# Patient Record
Sex: FEMALE | Race: OTHER | Employment: UNEMPLOYED | URBAN - METROPOLITAN AREA
[De-identification: names, ages, dates, MRNs, and addresses within clinical notes are randomized per-mention and may not be internally consistent; named-entity substitution may affect disease eponyms.]

---

## 2016-10-28 LAB — HIV RESULT OB: NEGATIVE

## 2017-01-07 ENCOUNTER — ROUTINE PRENATAL (OUTPATIENT)
Dept: OBGYN CLINIC | Facility: CLINIC | Age: 37
End: 2017-01-07

## 2017-01-07 ENCOUNTER — APPOINTMENT (OUTPATIENT)
Dept: LAB | Facility: HOSPITAL | Age: 37
End: 2017-01-07
Attending: OBSTETRICS & GYNECOLOGY
Payer: MEDICAID

## 2017-01-07 VITALS
WEIGHT: 112 LBS | DIASTOLIC BLOOD PRESSURE: 62 MMHG | SYSTOLIC BLOOD PRESSURE: 93 MMHG | BODY MASS INDEX: 20 KG/M2 | HEART RATE: 101 BPM

## 2017-01-07 DIAGNOSIS — Z34.92 ENCOUNTER FOR SUPERVISION OF NORMAL PREGNANCY IN SECOND TRIMESTER, UNSPECIFIED GRAVIDITY: ICD-10-CM

## 2017-01-07 DIAGNOSIS — Z34.92 ENCOUNTER FOR SUPERVISION OF NORMAL PREGNANCY IN SECOND TRIMESTER, UNSPECIFIED GRAVIDITY: Primary | ICD-10-CM

## 2017-01-07 LAB
ERYTHROCYTE [DISTWIDTH] IN BLOOD BY AUTOMATED COUNT: 14 % (ref 11–15)
GLUCOSE 1H P 50 G GLC PO SERPL-MCNC: 155 MG/DL
HCT VFR BLD AUTO: 35.7 % (ref 35–48)
HGB BLD-MCNC: 12 G/DL (ref 12–16)
MCH RBC QN AUTO: 31.4 PG (ref 27–32)
MCHC RBC AUTO-ENTMCNC: 33.6 G/DL (ref 32–37)
MCV RBC AUTO: 93.5 FL (ref 80–100)
MULTISTIX LOT#: NORMAL NUMERIC
PH, URINE: 7.5 (ref 4.5–8)
PLATELET # BLD AUTO: 265 K/UL (ref 140–400)
PMV BLD AUTO: 8.8 FL (ref 7.4–10.3)
RBC # BLD AUTO: 3.82 M/UL (ref 3.7–5.4)
SPECIFIC GRAVITY: 1.01 (ref 1–1.03)
UROBILINOGEN,SEMI-QN: 0.2 MG/DL (ref 0–1.9)
WBC # BLD AUTO: 7.9 K/UL (ref 4–11)

## 2017-01-07 PROCEDURE — 36415 COLL VENOUS BLD VENIPUNCTURE: CPT

## 2017-01-07 PROCEDURE — 0502F SUBSEQUENT PRENATAL CARE: CPT | Performed by: OBSTETRICS & GYNECOLOGY

## 2017-01-07 PROCEDURE — 82950 GLUCOSE TEST: CPT

## 2017-01-07 PROCEDURE — 85027 COMPLETE CBC AUTOMATED: CPT

## 2017-01-07 NOTE — PROGRESS NOTES
C/o being full all the time and not being able to eat much. Patient with 7# weight gain since last appointment. Reviewed need for regular prenatal care. 24-28 week labs ordered today and given to the patient.    RTC 2-3 weeks

## 2017-01-10 ENCOUNTER — TELEPHONE (OUTPATIENT)
Dept: OBGYN CLINIC | Facility: CLINIC | Age: 37
End: 2017-01-10

## 2017-01-10 DIAGNOSIS — O99.810 ABNORMAL MATERNAL GLUCOSE TOLERANCE, ANTEPARTUM: Primary | ICD-10-CM

## 2017-01-10 NOTE — TELEPHONE ENCOUNTER
Lm with father. States will let her know when he arrives home. Father attempted to give me # for pt and call dropped.

## 2017-01-10 NOTE — TELEPHONE ENCOUNTER
Attempted to call [phone] \"number not in service\", [phone] is a business number. Left a msg previously at home # listed as [phone] will try to call pt again later today.

## 2017-01-10 NOTE — TELEPHONE ENCOUNTER
Informed pt of one hour GTT results. Pt needs 3 hour GTT. # given to schedule appt, pt to call today. Instructed to fast for 12 hours prior to the test, nothing but water. Test ordered. Pt verbalized understanding.

## 2017-01-10 NOTE — TELEPHONE ENCOUNTER
----- Message from Cici Gaitan MD sent at 1/7/2017  5:20 PM CST -----  Please let patient know that she did not pass one hour GTT. She will need to take 3 hour GTT.

## 2017-01-10 NOTE — TELEPHONE ENCOUNTER
Per dad he is returning a call, and states that the pt can be reached at [phone] or [phone]. Please advise.

## 2017-01-11 ENCOUNTER — LAB ENCOUNTER (OUTPATIENT)
Dept: LAB | Facility: HOSPITAL | Age: 37
End: 2017-01-11
Attending: OBSTETRICS & GYNECOLOGY
Payer: MEDICAID

## 2017-01-11 DIAGNOSIS — O99.810 ABNORMAL MATERNAL GLUCOSE TOLERANCE, ANTEPARTUM: ICD-10-CM

## 2017-01-11 LAB
GLUCOSE 1H P GLC SERPL-MCNC: 170 MG/DL
GLUCOSE 2H P GLC SERPL-MCNC: 141 MG/DL
GLUCOSE 3H P GLC SERPL-MCNC: 136 MG/DL
GLUCOSE P FAST SERPL-MCNC: 82 MG/DL (ref 70–99)

## 2017-01-11 PROCEDURE — 36415 COLL VENOUS BLD VENIPUNCTURE: CPT

## 2017-01-11 PROCEDURE — 82951 GLUCOSE TOLERANCE TEST (GTT): CPT

## 2017-01-11 PROCEDURE — 82952 GTT-ADDED SAMPLES: CPT

## 2017-01-16 ENCOUNTER — ROUTINE PRENATAL (OUTPATIENT)
Dept: OBGYN CLINIC | Facility: CLINIC | Age: 37
End: 2017-01-16

## 2017-01-16 VITALS
SYSTOLIC BLOOD PRESSURE: 100 MMHG | WEIGHT: 115 LBS | HEART RATE: 99 BPM | DIASTOLIC BLOOD PRESSURE: 67 MMHG | BODY MASS INDEX: 20 KG/M2

## 2017-01-16 DIAGNOSIS — Z34.92 ENCOUNTER FOR SUPERVISION OF NORMAL PREGNANCY IN SECOND TRIMESTER, UNSPECIFIED GRAVIDITY: Primary | ICD-10-CM

## 2017-01-16 LAB
MULTISTIX LOT#: NORMAL NUMERIC
PH, URINE: 7 (ref 4.5–8)
SPECIFIC GRAVITY: 1.01 (ref 1–1.03)

## 2017-01-16 PROCEDURE — 90715 TDAP VACCINE 7 YRS/> IM: CPT | Performed by: OBSTETRICS & GYNECOLOGY

## 2017-01-16 PROCEDURE — 90471 IMMUNIZATION ADMIN: CPT | Performed by: OBSTETRICS & GYNECOLOGY

## 2017-01-16 PROCEDURE — 0502F SUBSEQUENT PRENATAL CARE: CPT | Performed by: OBSTETRICS & GYNECOLOGY

## 2017-01-23 ENCOUNTER — APPOINTMENT (OUTPATIENT)
Dept: LAB | Facility: HOSPITAL | Age: 37
End: 2017-01-23
Attending: OBSTETRICS & GYNECOLOGY
Payer: MEDICAID

## 2017-01-23 DIAGNOSIS — Z34.92 ENCOUNTER FOR SUPERVISION OF NORMAL PREGNANCY IN SECOND TRIMESTER, UNSPECIFIED GRAVIDITY: ICD-10-CM

## 2017-01-23 LAB
ANTIBODY SCREEN: NEGATIVE
RH BLOOD TYPE: NEGATIVE

## 2017-01-23 PROCEDURE — 86900 BLOOD TYPING SEROLOGIC ABO: CPT

## 2017-01-23 PROCEDURE — 36415 COLL VENOUS BLD VENIPUNCTURE: CPT

## 2017-01-23 PROCEDURE — 86901 BLOOD TYPING SEROLOGIC RH(D): CPT

## 2017-01-23 PROCEDURE — 86850 RBC ANTIBODY SCREEN: CPT

## 2017-01-30 ENCOUNTER — ROUTINE PRENATAL (OUTPATIENT)
Dept: OBGYN CLINIC | Facility: CLINIC | Age: 37
End: 2017-01-30

## 2017-01-30 VITALS
BODY MASS INDEX: 20 KG/M2 | DIASTOLIC BLOOD PRESSURE: 61 MMHG | WEIGHT: 114.81 LBS | SYSTOLIC BLOOD PRESSURE: 91 MMHG | HEART RATE: 98 BPM

## 2017-01-30 DIAGNOSIS — Z34.93 ENCOUNTER FOR SUPERVISION OF NORMAL PREGNANCY IN THIRD TRIMESTER, UNSPECIFIED GRAVIDITY: Primary | ICD-10-CM

## 2017-01-30 DIAGNOSIS — O09.523 AMA (ADVANCED MATERNAL AGE) MULTIGRAVIDA 35+, THIRD TRIMESTER: ICD-10-CM

## 2017-01-30 PROBLEM — Z34.90 PREGNANCY, OBSTETRICAL CARE: Status: ACTIVE | Noted: 2017-01-30

## 2017-01-30 PROBLEM — Z78.9 NON-ENGLISH SPEAKING PATIENT: Status: ACTIVE | Noted: 2017-01-30

## 2017-01-30 PROBLEM — Z34.90 PREGNANCY, OBSTETRICAL CARE (HCC): Status: ACTIVE | Noted: 2017-01-30

## 2017-01-30 LAB
MULTISTIX EXPIRATION DATE: NORMAL DATE
MULTISTIX LOT#: NORMAL NUMERIC
PH, URINE: 6.5 (ref 4.5–8)
SPECIFIC GRAVITY: 1.01 (ref 1–1.03)
UROBILINOGEN,SEMI-QN: 0.2 MG/DL (ref 0–1.9)

## 2017-01-30 PROCEDURE — 96372 THER/PROPH/DIAG INJ SC/IM: CPT | Performed by: OBSTETRICS & GYNECOLOGY

## 2017-01-30 PROCEDURE — 0502F SUBSEQUENT PRENATAL CARE: CPT | Performed by: OBSTETRICS & GYNECOLOGY

## 2017-01-30 NOTE — PATIENT INSTRUCTIONS
If You Are Rh Negative – Routine Administration    If you’re Rh negative, ask your health care provider about getting treated with RhoGam or Rhophylac. Even if you miscarry or don’t deliver the baby, you will still need treatment.  The health of any baby as directed      Location, date and time:  1/30/17, [facility], 8:50 AM

## 2017-01-30 NOTE — PROGRESS NOTES
Pt given Rhophylac 300 mcg today in RGM. Pt tolerated well. Pt had her ABO/RH and Antibody done on 1/23/17. ABO/RH was B Negative and Antibody was negative. Pt given Rhophylac card filled out. Pt signed consent. Alyson Grgregg[address] [phone]. EXP 30 March 2019.

## 2017-01-30 NOTE — PROGRESS NOTES
Dad at visit. No S/S of PTL. Does admit to small amount of white DC w/o pruritis. LOF or blood. No frequent tightening.

## 2017-02-04 ENCOUNTER — TELEPHONE (OUTPATIENT)
Dept: OBGYN CLINIC | Facility: CLINIC | Age: 37
End: 2017-02-04

## 2017-02-04 ENCOUNTER — HOSPITAL ENCOUNTER (OUTPATIENT)
Facility: HOSPITAL | Age: 37
Setting detail: OBSERVATION
Discharge: HOME | End: 2017-02-04
Attending: OBSTETRICS & GYNECOLOGY | Admitting: OBSTETRICS & GYNECOLOGY
Payer: MEDICAID

## 2017-02-04 ENCOUNTER — TELEPHONE (OUTPATIENT)
Dept: FAMILY MEDICINE CLINIC | Facility: CLINIC | Age: 37
End: 2017-02-04

## 2017-02-04 VITALS — SYSTOLIC BLOOD PRESSURE: 98 MMHG | DIASTOLIC BLOOD PRESSURE: 63 MMHG | HEART RATE: 88 BPM | TEMPERATURE: 99 F

## 2017-02-04 LAB
BILIRUB UR QL STRIP: NEGATIVE
CLARITY UR: CLEAR
COLOR UR: YELLOW
GLUCOSE UR STRIP-MCNC: NEGATIVE MG/DL
HGB UR QL STRIP: NEGATIVE
KETONES UR STRIP-MCNC: NEGATIVE MG/DL
NITRITE UR QL STRIP: NEGATIVE
PH UR STRIP: 7 [PH]
PROT UR STRIP-MCNC: NEGATIVE MG/DL
SP GR UR STRIP: 1.01
UROBILINOGEN UR STRIP-ACNC: <2 MG/DL

## 2017-02-04 PROCEDURE — 59025 FETAL NON-STRESS TEST: CPT | Performed by: OBSTETRICS & GYNECOLOGY

## 2017-02-04 NOTE — NST
Nonstress Test   Patient: Sania Tafoya    Gestation: 31w5d    NST:  Reactive, appropriate for gestational age.         Variability: Moderate           Accelerations: Yes           Decelerations: None            Baseline: 145 BPM           Uterine Irritability: Yes

## 2017-02-04 NOTE — TELEPHONE ENCOUNTER
WILL informed of below. Per NJG pt called yesterday and was advised to come into [facility] to be evaluated. Pt to come in now. Kizzy at [facility] notified.

## 2017-02-04 NOTE — TELEPHONE ENCOUNTER
Pt and father advised of [facility]'s recs and verbalizes understanding. Pt states did not have transportation to come in yesterday but will have her brother bring her in today and will arrive to [facility] in 30-40 minutes. Pt has no further questions.

## 2017-02-04 NOTE — TELEPHONE ENCOUNTER
31w5d states dry mouth and dizziness, and \"mid belly pain\" since yesterday at 2pm.  Pt called sister to help interpret as was not able to rate or describe pain.   Pt states thru sister pain at 4/10 and states pain feels \"different\" not a tabbing pain, n

## 2017-02-04 NOTE — TELEPHONE ENCOUNTER
[facility] spoke with pt earlier and pt was c/o abdominal pain, dry mouth, and dizziness. States pain radiating to left side of abdomen. [facility] pt told her pt called our office yesterday and was advised by NJ to come into [facility] but pt did not go.   B

## 2017-02-05 PROBLEM — O47.00 THREATENED PRETERM LABOR: Status: ACTIVE | Noted: 2017-02-05

## 2017-02-05 PROBLEM — O47.00 THREATENED PRETERM LABOR (HCC): Status: ACTIVE | Noted: 2017-02-05

## 2017-02-13 ENCOUNTER — HOSPITAL ENCOUNTER (OUTPATIENT)
Dept: ULTRASOUND IMAGING | Facility: HOSPITAL | Age: 37
Discharge: HOME | End: 2017-02-13
Attending: OBSTETRICS & GYNECOLOGY
Payer: MEDICAID

## 2017-02-13 DIAGNOSIS — O09.523 AMA (ADVANCED MATERNAL AGE) MULTIGRAVIDA 35+, THIRD TRIMESTER: ICD-10-CM

## 2017-02-13 DIAGNOSIS — Z34.93 ENCOUNTER FOR SUPERVISION OF NORMAL PREGNANCY IN THIRD TRIMESTER, UNSPECIFIED GRAVIDITY: ICD-10-CM

## 2017-02-13 PROCEDURE — 76816 OB US FOLLOW-UP PER FETUS: CPT

## 2017-02-14 ENCOUNTER — ROUTINE PRENATAL (OUTPATIENT)
Dept: OBGYN CLINIC | Facility: CLINIC | Age: 37
End: 2017-02-14

## 2017-02-14 VITALS
WEIGHT: 117 LBS | BODY MASS INDEX: 21 KG/M2 | SYSTOLIC BLOOD PRESSURE: 97 MMHG | DIASTOLIC BLOOD PRESSURE: 66 MMHG | HEART RATE: 103 BPM

## 2017-02-14 DIAGNOSIS — Z34.93 ENCOUNTER FOR SUPERVISION OF NORMAL PREGNANCY IN THIRD TRIMESTER, UNSPECIFIED GRAVIDITY: Primary | ICD-10-CM

## 2017-02-14 LAB
MULTISTIX LOT#: NORMAL NUMERIC
PH, URINE: 7 (ref 4.5–8)
SPECIFIC GRAVITY: 1.01 (ref 1–1.03)
UROBILINOGEN,SEMI-QN: 0.2 MG/DL (ref 0–1.9)

## 2017-02-14 PROCEDURE — 0502F SUBSEQUENT PRENATAL CARE: CPT | Performed by: OBSTETRICS & GYNECOLOGY

## 2017-02-16 ENCOUNTER — TELEPHONE (OUTPATIENT)
Dept: OBGYN CLINIC | Facility: CLINIC | Age: 37
End: 2017-02-16

## 2017-02-16 NOTE — TELEPHONE ENCOUNTER
Pt states she is returning our call, states someone called her and the call dropped. No notes stating we called her. Apologized to pt and informed her that whoever called did not chart a note and will likely call her back.

## 2017-02-27 ENCOUNTER — ROUTINE PRENATAL (OUTPATIENT)
Dept: OBGYN CLINIC | Facility: CLINIC | Age: 37
End: 2017-02-27

## 2017-02-27 ENCOUNTER — APPOINTMENT (OUTPATIENT)
Dept: LAB | Facility: HOSPITAL | Age: 37
End: 2017-02-27
Attending: OBSTETRICS & GYNECOLOGY
Payer: MEDICAID

## 2017-02-27 VITALS
BODY MASS INDEX: 21 KG/M2 | HEART RATE: 102 BPM | SYSTOLIC BLOOD PRESSURE: 95 MMHG | WEIGHT: 118 LBS | DIASTOLIC BLOOD PRESSURE: 61 MMHG

## 2017-02-27 DIAGNOSIS — Z34.93 ENCOUNTER FOR SUPERVISION OF NORMAL PREGNANCY IN THIRD TRIMESTER, UNSPECIFIED GRAVIDITY: ICD-10-CM

## 2017-02-27 DIAGNOSIS — Z34.83 ENCOUNTER FOR SUPERVISION OF OTHER NORMAL PREGNANCY IN THIRD TRIMESTER: Primary | ICD-10-CM

## 2017-02-27 LAB
APPEARANCE: CLEAR
ERYTHROCYTE [DISTWIDTH] IN BLOOD BY AUTOMATED COUNT: 13.6 % (ref 11–15)
HCT VFR BLD AUTO: 33.7 % (ref 35–48)
HGB BLD-MCNC: 11.4 G/DL (ref 12–16)
MCH RBC QN AUTO: 30.8 PG (ref 27–32)
MCHC RBC AUTO-ENTMCNC: 33.8 G/DL (ref 32–37)
MCV RBC AUTO: 91 FL (ref 80–100)
MULTISTIX LOT#: ABNORMAL NUMERIC
PH, URINE: 6 (ref 4.5–8)
PLATELET # BLD AUTO: 268 K/UL (ref 140–400)
PMV BLD AUTO: 8.4 FL (ref 7.4–10.3)
RBC # BLD AUTO: 3.7 M/UL (ref 3.7–5.4)
SPECIFIC GRAVITY: 1 (ref 1–1.03)
URINE-COLOR: YELLOW
WBC # BLD AUTO: 7.2 K/UL (ref 4–11)

## 2017-02-27 PROCEDURE — 81002 URINALYSIS NONAUTO W/O SCOPE: CPT | Performed by: OBSTETRICS & GYNECOLOGY

## 2017-02-27 PROCEDURE — 0502F SUBSEQUENT PRENATAL CARE: CPT | Performed by: OBSTETRICS & GYNECOLOGY

## 2017-02-27 PROCEDURE — 36415 COLL VENOUS BLD VENIPUNCTURE: CPT

## 2017-02-27 PROCEDURE — 86780 TREPONEMA PALLIDUM: CPT

## 2017-02-27 PROCEDURE — 85027 COMPLETE CBC AUTOMATED: CPT

## 2017-03-01 LAB — T PALLIDUM AB SER QL: NEGATIVE

## 2017-03-07 ENCOUNTER — TELEPHONE (OUTPATIENT)
Dept: OBGYN CLINIC | Facility: CLINIC | Age: 37
End: 2017-03-07

## 2017-03-07 NOTE — TELEPHONE ENCOUNTER
Pt states that she can't hear me, asked to call her father. Pt's father states that pt has been very Talia" and \"irritable\" since she became pregnant. He feels pt may be depressed but states that pt has not talked about hurting herself or others.  Pt i

## 2017-03-07 NOTE — TELEPHONE ENCOUNTER
Pt was at [facility] office re: EPDS looks edgard 19; feeling overwhelmed; first pregnancy  Was referred to consuling  Pt is 37 wks  Pt has broken [place]

## 2017-03-08 ENCOUNTER — ROUTINE PRENATAL (OUTPATIENT)
Dept: OBGYN CLINIC | Facility: CLINIC | Age: 37
End: 2017-03-08

## 2017-03-08 VITALS
SYSTOLIC BLOOD PRESSURE: 91 MMHG | WEIGHT: 120.63 LBS | DIASTOLIC BLOOD PRESSURE: 59 MMHG | HEART RATE: 90 BPM | BODY MASS INDEX: 21 KG/M2

## 2017-03-08 DIAGNOSIS — Z34.93 ENCOUNTER FOR SUPERVISION OF NORMAL PREGNANCY IN THIRD TRIMESTER, UNSPECIFIED GRAVIDITY: Primary | ICD-10-CM

## 2017-03-08 PROCEDURE — 0502F SUBSEQUENT PRENATAL CARE: CPT | Performed by: OBSTETRICS & GYNECOLOGY

## 2017-03-13 ENCOUNTER — ROUTINE PRENATAL (OUTPATIENT)
Dept: OBGYN CLINIC | Facility: CLINIC | Age: 37
End: 2017-03-13

## 2017-03-13 VITALS
SYSTOLIC BLOOD PRESSURE: 101 MMHG | DIASTOLIC BLOOD PRESSURE: 71 MMHG | HEART RATE: 108 BPM | BODY MASS INDEX: 21 KG/M2 | WEIGHT: 121 LBS

## 2017-03-13 DIAGNOSIS — Z34.93 ENCOUNTER FOR SUPERVISION OF NORMAL PREGNANCY IN THIRD TRIMESTER, UNSPECIFIED GRAVIDITY: Primary | ICD-10-CM

## 2017-03-13 LAB
MULTISTIX LOT#: NORMAL NUMERIC
PH, URINE: 6.5 (ref 4.5–8)
SPECIFIC GRAVITY: 1.01 (ref 1–1.03)
UROBILINOGEN,SEMI-QN: 0.2 MG/DL (ref 0–1.9)

## 2017-03-13 PROCEDURE — 0502F SUBSEQUENT PRENATAL CARE: CPT | Performed by: OBSTETRICS & GYNECOLOGY

## 2017-03-15 ENCOUNTER — TELEPHONE (OUTPATIENT)
Dept: OBGYN CLINIC | Facility: CLINIC | Age: 37
End: 2017-03-15

## 2017-03-16 NOTE — TELEPHONE ENCOUNTER
I spoke with patient's Father , who usually translates at visits. She is having some low back pain and a tight feeling in abdomen since 1600. No rhythmic tightening, LOF or blood. Baby is active. Labor instructions translated to patient.

## 2017-03-19 ENCOUNTER — HOSPITAL ENCOUNTER (OUTPATIENT)
Facility: HOSPITAL | Age: 37
Setting detail: OBSERVATION
Discharge: HOME | End: 2017-03-19
Attending: OBSTETRICS & GYNECOLOGY | Admitting: OBSTETRICS & GYNECOLOGY
Payer: MEDICAID

## 2017-03-19 VITALS
SYSTOLIC BLOOD PRESSURE: 103 MMHG | TEMPERATURE: 98 F | RESPIRATION RATE: 18 BRPM | DIASTOLIC BLOOD PRESSURE: 68 MMHG | HEART RATE: 79 BPM

## 2017-03-19 PROCEDURE — 59025 FETAL NON-STRESS TEST: CPT | Performed by: OBSTETRICS & GYNECOLOGY

## 2017-03-20 NOTE — TRIAGE
[facility]      Patient came in stating she has not felt the baby move since 4 pm, nst done, baby reactive, contractions occuring every 4-5 min, sve 1/30/-3. Dr. Pete Birmingham notified.  Telephone order to discharge patient home with labor instructio BPM           Uterine Irritability: Yes           Contractions: Regular           Minutes Between Contractions: 5 min (4-5 )           Acoustic Stimulator: No           Nonstress Test Interpretation: Reactive           Nonstress Test Second Interpretation:

## 2017-03-21 ENCOUNTER — ROUTINE PRENATAL (OUTPATIENT)
Dept: OBGYN CLINIC | Facility: CLINIC | Age: 37
End: 2017-03-21

## 2017-03-21 VITALS
SYSTOLIC BLOOD PRESSURE: 101 MMHG | BODY MASS INDEX: 22 KG/M2 | WEIGHT: 125 LBS | HEART RATE: 91 BPM | DIASTOLIC BLOOD PRESSURE: 69 MMHG

## 2017-03-21 DIAGNOSIS — Z34.93 ENCOUNTER FOR SUPERVISION OF NORMAL PREGNANCY IN THIRD TRIMESTER, UNSPECIFIED GRAVIDITY: Primary | ICD-10-CM

## 2017-03-21 LAB
MULTISTIX LOT#: NORMAL NUMERIC
PH, URINE: 6 (ref 4.5–8)
SPECIFIC GRAVITY: 1.01 (ref 1–1.03)
UROBILINOGEN,SEMI-QN: 0.2 MG/DL (ref 0–1.9)

## 2017-03-21 PROCEDURE — 0502F SUBSEQUENT PRENATAL CARE: CPT | Performed by: OBSTETRICS & GYNECOLOGY

## 2017-03-21 NOTE — PROGRESS NOTES
Irregular contractions overnight and had gush of fluid. Pooling/ferning/nitrazine negative in office. Cx 2/60/-3. Labor precautions given. RTC 1 wk.

## 2017-03-22 ENCOUNTER — TELEPHONE (OUTPATIENT)
Dept: OBGYN CLINIC | Facility: CLINIC | Age: 37
End: 2017-03-22

## 2017-03-22 NOTE — TELEPHONE ENCOUNTER
The pt is 38 weeks pregnant, and hurt her tooth. The pt is at the dentist now but needs clearance to get her tooth taken care of. Please fax clearance to Dr Stacey Linton at . Please advise.

## 2017-03-24 ENCOUNTER — TELEPHONE (OUTPATIENT)
Dept: OBGYN CLINIC | Facility: CLINIC | Age: 37
End: 2017-03-24

## 2017-03-25 ENCOUNTER — HOSPITAL ENCOUNTER (OUTPATIENT)
Facility: HOSPITAL | Age: 37
Setting detail: OBSERVATION
Discharge: HOME | End: 2017-03-25
Attending: OBSTETRICS & GYNECOLOGY | Admitting: OBSTETRICS & GYNECOLOGY
Payer: MEDICAID

## 2017-03-25 VITALS
SYSTOLIC BLOOD PRESSURE: 110 MMHG | HEART RATE: 78 BPM | DIASTOLIC BLOOD PRESSURE: 75 MMHG | TEMPERATURE: 98 F | RESPIRATION RATE: 18 BRPM

## 2017-03-25 PROBLEM — Z34.90 PREGNANCY: Status: ACTIVE | Noted: 2017-03-25

## 2017-03-25 PROBLEM — Z34.90 PREGNANCY (HCC): Status: ACTIVE | Noted: 2017-03-25

## 2017-03-25 PROCEDURE — 59025 FETAL NON-STRESS TEST: CPT | Performed by: OBSTETRICS & GYNECOLOGY

## 2017-03-25 NOTE — TRIAGE
[facility]      Triage Note    Cleotis Man Patient Status:  Observation    1980 MRN [id number]   Location [address] C-D Attending Ryan Tavarez DO   Hosp Day # 0 PCP MD Zain Scherre Para: Aris Aguillon sonya  In with c/o painful UC. Reactive tracing, uterus soft and relaxed, UC palpating mildly. Irregular UC per toco. No fluid or bloody show noted. No change from office vaginal exam. Pt DC home with labor instructions and kick counts.        Edwar Tomas

## 2017-03-25 NOTE — TELEPHONE ENCOUNTER
On CALL--  Spoke with pt father. Pt c/o swelling in feet tonight and increased thick vaginal discharge. Recommended elevating feet and increase water. Labor precautions given.

## 2017-03-29 ENCOUNTER — ROUTINE PRENATAL (OUTPATIENT)
Dept: OBGYN CLINIC | Facility: CLINIC | Age: 37
End: 2017-03-29

## 2017-03-29 VITALS
SYSTOLIC BLOOD PRESSURE: 107 MMHG | DIASTOLIC BLOOD PRESSURE: 72 MMHG | HEART RATE: 80 BPM | WEIGHT: 124 LBS | BODY MASS INDEX: 22 KG/M2

## 2017-03-29 DIAGNOSIS — Z34.93 ENCOUNTER FOR SUPERVISION OF NORMAL PREGNANCY IN THIRD TRIMESTER, UNSPECIFIED GRAVIDITY: Primary | ICD-10-CM

## 2017-03-29 LAB
MULTISTIX LOT#: NORMAL NUMERIC
PH, URINE: 7 (ref 4.5–8)
SPECIFIC GRAVITY: 1 (ref 1–1.03)

## 2017-03-29 PROCEDURE — 0502F SUBSEQUENT PRENATAL CARE: CPT | Performed by: OBSTETRICS & GYNECOLOGY

## 2017-04-03 ENCOUNTER — TELEPHONE (OUTPATIENT)
Dept: OBGYN CLINIC | Facility: CLINIC | Age: 37
End: 2017-04-03

## 2017-04-04 ENCOUNTER — HOSPITAL ENCOUNTER (INPATIENT)
Facility: HOSPITAL | Age: 37
LOS: 5 days | Discharge: HOME | End: 2017-04-09
Attending: OBSTETRICS & GYNECOLOGY | Admitting: OBSTETRICS & GYNECOLOGY
Payer: MEDICAID

## 2017-04-04 DIAGNOSIS — O42.10: ICD-10-CM

## 2017-04-04 RX ORDER — DEXTROSE, SODIUM CHLORIDE, SODIUM LACTATE, POTASSIUM CHLORIDE, AND CALCIUM CHLORIDE 5; .6; .31; .03; .02 G/100ML; G/100ML; G/100ML; G/100ML; G/100ML
INJECTION, SOLUTION INTRAVENOUS
Status: COMPLETED
Start: 2017-04-04 | End: 2017-04-04

## 2017-04-04 RX ORDER — IBUPROFEN 600 MG/1
600 TABLET ORAL ONCE AS NEEDED
Status: DISCONTINUED | OUTPATIENT
Start: 2017-04-04 | End: 2017-04-05 | Stop reason: HOSPADM

## 2017-04-04 RX ORDER — DEXTROSE, SODIUM CHLORIDE, SODIUM LACTATE, POTASSIUM CHLORIDE, AND CALCIUM CHLORIDE 5; .6; .31; .03; .02 G/100ML; G/100ML; G/100ML; G/100ML; G/100ML
125 INJECTION, SOLUTION INTRAVENOUS CONTINUOUS
Status: DISCONTINUED | OUTPATIENT
Start: 2017-04-04 | End: 2017-04-05 | Stop reason: HOSPADM

## 2017-04-04 RX ORDER — SODIUM CHLORIDE 0.9 % (FLUSH) 0.9 %
10 SYRINGE (ML) INJECTION AS NEEDED
Status: DISCONTINUED | OUTPATIENT
Start: 2017-04-04 | End: 2017-04-05 | Stop reason: HOSPADM

## 2017-04-04 RX ORDER — TERBUTALINE SULFATE 1 MG/ML
0.25 INJECTION, SOLUTION SUBCUTANEOUS AS NEEDED
Status: DISCONTINUED | OUTPATIENT
Start: 2017-04-04 | End: 2017-04-05 | Stop reason: HOSPADM

## 2017-04-04 RX ORDER — AMMONIA INHALANTS 0.04 G/.3ML
0.3 INHALANT RESPIRATORY (INHALATION) AS NEEDED
Status: DISCONTINUED | OUTPATIENT
Start: 2017-04-04 | End: 2017-04-05

## 2017-04-04 RX ORDER — LIDOCAINE HYDROCHLORIDE 10 MG/ML
30 INJECTION, SOLUTION EPIDURAL; INFILTRATION; INTRACAUDAL; PERINEURAL ONCE
Status: DISCONTINUED | OUTPATIENT
Start: 2017-04-04 | End: 2017-04-05 | Stop reason: HOSPADM

## 2017-04-04 NOTE — TELEPHONE ENCOUNTER
Pt 40w1d calling to report that since she spoke with JEFF this morning, she has been experiencing increased LOF. Pt stated that 2-3x throughout the day today she has noticed \"clear fluid dripping down her leg\". Pt stated it does not smell like urine.  Pt r

## 2017-04-04 NOTE — TELEPHONE ENCOUNTER
Paged on call with complaint of UC's irregular and mild intensity. Had questionable wetness at 0500 but no gross leakage since. Good FM. Labor instructions reviewed. Come to [facility] if any repeat wetness.

## 2017-04-05 ENCOUNTER — ANESTHESIA (OUTPATIENT)
Dept: OBGYN UNIT | Facility: HOSPITAL | Age: 37
End: 2017-04-05
Payer: MEDICAID

## 2017-04-05 ENCOUNTER — SURGERY (OUTPATIENT)
Age: 37
End: 2017-04-05

## 2017-04-05 ENCOUNTER — ANESTHESIA EVENT (OUTPATIENT)
Dept: OBGYN UNIT | Facility: HOSPITAL | Age: 37
End: 2017-04-05
Payer: MEDICAID

## 2017-04-05 PROCEDURE — 59514 CESAREAN DELIVERY ONLY: CPT | Performed by: OBSTETRICS & GYNECOLOGY

## 2017-04-05 RX ORDER — ACETAMINOPHEN 325 MG/1
650 TABLET ORAL EVERY 6 HOURS PRN
Status: ACTIVE | OUTPATIENT
Start: 2017-04-05 | End: 2017-04-06

## 2017-04-05 RX ORDER — SODIUM CHLORIDE, SODIUM LACTATE, POTASSIUM CHLORIDE, CALCIUM CHLORIDE 600; 310; 30; 20 MG/100ML; MG/100ML; MG/100ML; MG/100ML
INJECTION, SOLUTION INTRAVENOUS CONTINUOUS PRN
Status: DISCONTINUED | OUTPATIENT
Start: 2017-04-05 | End: 2017-04-05 | Stop reason: SURG

## 2017-04-05 RX ORDER — HYDROCODONE BITARTRATE AND ACETAMINOPHEN 5; 325 MG/1; MG/1
2 TABLET ORAL AS NEEDED
Status: DISCONTINUED | OUTPATIENT
Start: 2017-04-05 | End: 2017-04-05 | Stop reason: HOSPADM

## 2017-04-05 RX ORDER — SODIUM CHLORIDE, SODIUM LACTATE, POTASSIUM CHLORIDE, CALCIUM CHLORIDE 600; 310; 30; 20 MG/100ML; MG/100ML; MG/100ML; MG/100ML
INJECTION, SOLUTION INTRAVENOUS
Status: COMPLETED
Start: 2017-04-05 | End: 2017-04-05

## 2017-04-05 RX ORDER — LIDOCAINE HYDROCHLORIDE 10 MG/ML
INJECTION, SOLUTION EPIDURAL; INFILTRATION; INTRACAUDAL; PERINEURAL AS NEEDED
Status: DISCONTINUED | OUTPATIENT
Start: 2017-04-05 | End: 2017-04-05 | Stop reason: SURG

## 2017-04-05 RX ORDER — EPHEDRINE SULFATE/0.9% NACL/PF 25 MG/5 ML
5 SYRINGE (ML) INTRAVENOUS AS NEEDED
Status: DISCONTINUED | OUTPATIENT
Start: 2017-04-05 | End: 2017-04-05 | Stop reason: HOSPADM

## 2017-04-05 RX ORDER — MORPHINE SULFATE 1 MG/ML
INJECTION, SOLUTION EPIDURAL; INTRATHECAL; INTRAVENOUS AS NEEDED
Status: DISCONTINUED | OUTPATIENT
Start: 2017-04-05 | End: 2017-04-05 | Stop reason: SURG

## 2017-04-05 RX ORDER — ONDANSETRON 2 MG/ML
4 INJECTION INTRAMUSCULAR; INTRAVENOUS ONCE AS NEEDED
Status: DISPENSED | OUTPATIENT
Start: 2017-04-05 | End: 2017-04-05

## 2017-04-05 RX ORDER — MORPHINE SULFATE 10 MG/ML
6 INJECTION, SOLUTION INTRAMUSCULAR; INTRAVENOUS EVERY 10 MIN PRN
Status: DISCONTINUED | OUTPATIENT
Start: 2017-04-05 | End: 2017-04-05 | Stop reason: HOSPADM

## 2017-04-05 RX ORDER — NALOXONE HYDROCHLORIDE 0.4 MG/ML
80 INJECTION, SOLUTION INTRAMUSCULAR; INTRAVENOUS; SUBCUTANEOUS AS NEEDED
Status: DISCONTINUED | OUTPATIENT
Start: 2017-04-05 | End: 2017-04-05 | Stop reason: HOSPADM

## 2017-04-05 RX ORDER — SODIUM CHLORIDE, SODIUM LACTATE, POTASSIUM CHLORIDE, CALCIUM CHLORIDE 600; 310; 30; 20 MG/100ML; MG/100ML; MG/100ML; MG/100ML
INJECTION, SOLUTION INTRAVENOUS CONTINUOUS
Status: DISCONTINUED | OUTPATIENT
Start: 2017-04-05 | End: 2017-04-05 | Stop reason: HOSPADM

## 2017-04-05 RX ORDER — METOCLOPRAMIDE HYDROCHLORIDE 5 MG/ML
INJECTION INTRAMUSCULAR; INTRAVENOUS
Status: COMPLETED
Start: 2017-04-05 | End: 2017-04-05

## 2017-04-05 RX ORDER — LIDOCAINE HYDROCHLORIDE AND EPINEPHRINE 15; 5 MG/ML; UG/ML
INJECTION, SOLUTION EPIDURAL AS NEEDED
Status: DISCONTINUED | OUTPATIENT
Start: 2017-04-05 | End: 2017-04-05 | Stop reason: SURG

## 2017-04-05 RX ORDER — HYDROMORPHONE HYDROCHLORIDE 1 MG/ML
0.4 INJECTION, SOLUTION INTRAMUSCULAR; INTRAVENOUS; SUBCUTANEOUS
Status: ACTIVE | OUTPATIENT
Start: 2017-04-05 | End: 2017-04-06

## 2017-04-05 RX ORDER — NALBUPHINE HCL 10 MG/ML
2.5 AMPUL (ML) INJECTION EVERY 4 HOURS PRN
Status: ACTIVE | OUTPATIENT
Start: 2017-04-05 | End: 2017-04-06

## 2017-04-05 RX ORDER — PHENYLEPHRINE HCL IN 0.9% NACL 0.5 MG/5ML
SYRINGE (ML) INTRAVENOUS
Status: DISCONTINUED
Start: 2017-04-05 | End: 2017-04-05 | Stop reason: WASHOUT

## 2017-04-05 RX ORDER — FAMOTIDINE 10 MG/ML
INJECTION, SOLUTION INTRAVENOUS
Status: COMPLETED
Start: 2017-04-05 | End: 2017-04-05

## 2017-04-05 RX ORDER — LIDOCAINE HYDROCHLORIDE AND EPINEPHRINE 20; 5 MG/ML; UG/ML
INJECTION, SOLUTION EPIDURAL; INFILTRATION; INTRACAUDAL; PERINEURAL AS NEEDED
Status: DISCONTINUED | OUTPATIENT
Start: 2017-04-05 | End: 2017-04-05 | Stop reason: SURG

## 2017-04-05 RX ORDER — PHENYLEPHRINE HCL 10 MG/ML
VIAL (ML) INJECTION AS NEEDED
Status: DISCONTINUED | OUTPATIENT
Start: 2017-04-05 | End: 2017-04-05 | Stop reason: SURG

## 2017-04-05 RX ORDER — HYDROMORPHONE HYDROCHLORIDE 1 MG/ML
0.4 INJECTION, SOLUTION INTRAMUSCULAR; INTRAVENOUS; SUBCUTANEOUS EVERY 5 MIN PRN
Status: DISCONTINUED | OUTPATIENT
Start: 2017-04-05 | End: 2017-04-05 | Stop reason: HOSPADM

## 2017-04-05 RX ORDER — DIPHENHYDRAMINE HYDROCHLORIDE 50 MG/ML
12.5 INJECTION INTRAMUSCULAR; INTRAVENOUS EVERY 4 HOURS PRN
Status: ACTIVE | OUTPATIENT
Start: 2017-04-05 | End: 2017-04-06

## 2017-04-05 RX ORDER — ONDANSETRON 2 MG/ML
INJECTION INTRAMUSCULAR; INTRAVENOUS
Status: COMPLETED
Start: 2017-04-05 | End: 2017-04-05

## 2017-04-05 RX ORDER — AMMONIA INHALANTS 0.04 G/.3ML
0.3 INHALANT RESPIRATORY (INHALATION) AS NEEDED
Status: DISCONTINUED | OUTPATIENT
Start: 2017-04-05 | End: 2017-04-09

## 2017-04-05 RX ORDER — METHYLERGONOVINE MALEATE 0.2 MG/ML
INJECTION INTRAVENOUS
Status: COMPLETED
Start: 2017-04-05 | End: 2017-04-05

## 2017-04-05 RX ORDER — HYDROMORPHONE HYDROCHLORIDE 1 MG/ML
0.6 INJECTION, SOLUTION INTRAMUSCULAR; INTRAVENOUS; SUBCUTANEOUS EVERY 5 MIN PRN
Status: DISCONTINUED | OUTPATIENT
Start: 2017-04-05 | End: 2017-04-05 | Stop reason: HOSPADM

## 2017-04-05 RX ORDER — BUPIVACAINE HYDROCHLORIDE 2.5 MG/ML
INJECTION, SOLUTION EPIDURAL; INFILTRATION; INTRACAUDAL
Status: COMPLETED
Start: 2017-04-05 | End: 2017-04-05

## 2017-04-05 RX ORDER — EPHEDRINE SULFATE/0.9% NACL/PF 25 MG/5 ML
SYRINGE (ML) INTRAVENOUS
Status: DISCONTINUED
Start: 2017-04-05 | End: 2017-04-05 | Stop reason: WASHOUT

## 2017-04-05 RX ORDER — ONDANSETRON 2 MG/ML
INJECTION INTRAMUSCULAR; INTRAVENOUS AS NEEDED
Status: DISCONTINUED | OUTPATIENT
Start: 2017-04-05 | End: 2017-04-05 | Stop reason: SURG

## 2017-04-05 RX ORDER — ONDANSETRON 2 MG/ML
4 INJECTION INTRAMUSCULAR; INTRAVENOUS ONCE AS NEEDED
Status: DISCONTINUED | OUTPATIENT
Start: 2017-04-05 | End: 2017-04-05 | Stop reason: HOSPADM

## 2017-04-05 RX ORDER — HYDROMORPHONE HYDROCHLORIDE 1 MG/ML
0.6 INJECTION, SOLUTION INTRAMUSCULAR; INTRAVENOUS; SUBCUTANEOUS
Status: ACTIVE | OUTPATIENT
Start: 2017-04-05 | End: 2017-04-06

## 2017-04-05 RX ORDER — LIDOCAINE HYDROCHLORIDE AND EPINEPHRINE 20; 5 MG/ML; UG/ML
INJECTION, SOLUTION EPIDURAL; INFILTRATION; INTRACAUDAL; PERINEURAL
Status: DISCONTINUED
Start: 2017-04-05 | End: 2017-04-05 | Stop reason: WASHOUT

## 2017-04-05 RX ORDER — MORPHINE SULFATE 2 MG/ML
2 INJECTION, SOLUTION INTRAMUSCULAR; INTRAVENOUS EVERY 10 MIN PRN
Status: DISCONTINUED | OUTPATIENT
Start: 2017-04-05 | End: 2017-04-05 | Stop reason: HOSPADM

## 2017-04-05 RX ORDER — SODIUM CHLORIDE, SODIUM LACTATE, POTASSIUM CHLORIDE, CALCIUM CHLORIDE 600; 310; 30; 20 MG/100ML; MG/100ML; MG/100ML; MG/100ML
INJECTION, SOLUTION INTRAVENOUS
Status: DISPENSED
Start: 2017-04-05 | End: 2017-04-05

## 2017-04-05 RX ORDER — HYDROCODONE BITARTRATE AND ACETAMINOPHEN 5; 325 MG/1; MG/1
1 TABLET ORAL AS NEEDED
Status: DISCONTINUED | OUTPATIENT
Start: 2017-04-05 | End: 2017-04-05 | Stop reason: HOSPADM

## 2017-04-05 RX ORDER — DIPHENHYDRAMINE HCL 25 MG
25 CAPSULE ORAL EVERY 4 HOURS PRN
Status: ACTIVE | OUTPATIENT
Start: 2017-04-05 | End: 2017-04-06

## 2017-04-05 RX ORDER — MORPHINE SULFATE 4 MG/ML
4 INJECTION, SOLUTION INTRAMUSCULAR; INTRAVENOUS EVERY 10 MIN PRN
Status: DISCONTINUED | OUTPATIENT
Start: 2017-04-05 | End: 2017-04-05 | Stop reason: HOSPADM

## 2017-04-05 RX ORDER — NALOXONE HYDROCHLORIDE 0.4 MG/ML
0.08 INJECTION, SOLUTION INTRAMUSCULAR; INTRAVENOUS; SUBCUTANEOUS
Status: ACTIVE | OUTPATIENT
Start: 2017-04-05 | End: 2017-04-06

## 2017-04-05 RX ORDER — DEXAMETHASONE SODIUM PHOSPHATE 4 MG/ML
VIAL (ML) INJECTION AS NEEDED
Status: DISCONTINUED | OUTPATIENT
Start: 2017-04-05 | End: 2017-04-05 | Stop reason: SURG

## 2017-04-05 RX ORDER — LABETALOL HYDROCHLORIDE 5 MG/ML
5 INJECTION, SOLUTION INTRAVENOUS ONCE
Status: DISCONTINUED | OUTPATIENT
Start: 2017-04-05 | End: 2017-04-09

## 2017-04-05 RX ORDER — ONDANSETRON 2 MG/ML
4 INJECTION INTRAMUSCULAR; INTRAVENOUS EVERY 6 HOURS PRN
Status: DISCONTINUED | OUTPATIENT
Start: 2017-04-05 | End: 2017-04-09

## 2017-04-05 RX ORDER — HYDROCODONE BITARTRATE AND ACETAMINOPHEN 7.5; 325 MG/1; MG/1
1 TABLET ORAL EVERY 6 HOURS PRN
Status: DISPENSED | OUTPATIENT
Start: 2017-04-05 | End: 2017-04-06

## 2017-04-05 RX ORDER — HALOPERIDOL 5 MG/ML
0.5 INJECTION INTRAMUSCULAR ONCE AS NEEDED
Status: DISPENSED | OUTPATIENT
Start: 2017-04-05 | End: 2017-04-05

## 2017-04-05 RX ORDER — NALBUPHINE HCL 10 MG/ML
2.5 AMPUL (ML) INJECTION
Status: DISCONTINUED | OUTPATIENT
Start: 2017-04-05 | End: 2017-04-05 | Stop reason: HOSPADM

## 2017-04-05 RX ORDER — HYDROCODONE BITARTRATE AND ACETAMINOPHEN 7.5; 325 MG/1; MG/1
2 TABLET ORAL EVERY 6 HOURS PRN
Status: ACTIVE | OUTPATIENT
Start: 2017-04-05 | End: 2017-04-06

## 2017-04-05 RX ORDER — HYDROMORPHONE HYDROCHLORIDE 1 MG/ML
0.2 INJECTION, SOLUTION INTRAMUSCULAR; INTRAVENOUS; SUBCUTANEOUS EVERY 5 MIN PRN
Status: DISCONTINUED | OUTPATIENT
Start: 2017-04-05 | End: 2017-04-05 | Stop reason: HOSPADM

## 2017-04-05 RX ADMIN — LIDOCAINE HYDROCHLORIDE AND EPINEPHRINE 5 ML: 20; 5 INJECTION, SOLUTION EPIDURAL; INFILTRATION; INTRACAUDAL; PERINEURAL at 18:27:00

## 2017-04-05 RX ADMIN — PHENYLEPHRINE HCL 200 MCG: 10 MG/ML VIAL (ML) INJECTION at 19:00:00

## 2017-04-05 RX ADMIN — PHENYLEPHRINE HCL 200 MCG: 10 MG/ML VIAL (ML) INJECTION at 18:25:00

## 2017-04-05 RX ADMIN — SODIUM CHLORIDE, SODIUM LACTATE, POTASSIUM CHLORIDE, CALCIUM CHLORIDE: 600; 310; 30; 20 INJECTION, SOLUTION INTRAVENOUS at 18:17:00

## 2017-04-05 RX ADMIN — DEXAMETHASONE SODIUM PHOSPHATE 4 MG: 4 MG/ML VIAL (ML) INJECTION at 18:50:00

## 2017-04-05 RX ADMIN — LIDOCAINE HYDROCHLORIDE 3 ML: 10 INJECTION, SOLUTION EPIDURAL; INFILTRATION; INTRACAUDAL; PERINEURAL at 09:20:00

## 2017-04-05 RX ADMIN — BUPIVACAINE HYDROCHLORIDE 10 ML: 2.5 INJECTION, SOLUTION EPIDURAL; INFILTRATION; INTRACAUDAL at 09:24:00

## 2017-04-05 RX ADMIN — PHENYLEPHRINE HCL 200 MCG: 10 MG/ML VIAL (ML) INJECTION at 18:55:00

## 2017-04-05 RX ADMIN — METHYLERGONOVINE MALEATE 0.2 MG: 0.2 INJECTION INTRAVENOUS at 18:52:00

## 2017-04-05 RX ADMIN — LIDOCAINE HYDROCHLORIDE AND EPINEPHRINE 10 ML: 20; 5 INJECTION, SOLUTION EPIDURAL; INFILTRATION; INTRACAUDAL; PERINEURAL at 18:21:00

## 2017-04-05 RX ADMIN — SODIUM CHLORIDE, SODIUM LACTATE, POTASSIUM CHLORIDE, CALCIUM CHLORIDE: 600; 310; 30; 20 INJECTION, SOLUTION INTRAVENOUS at 18:55:00

## 2017-04-05 RX ADMIN — ONDANSETRON 4 MG: 2 INJECTION INTRAMUSCULAR; INTRAVENOUS at 18:50:00

## 2017-04-05 RX ADMIN — LIDOCAINE HYDROCHLORIDE AND EPINEPHRINE 5 ML: 15; 5 INJECTION, SOLUTION EPIDURAL at 09:23:00

## 2017-04-05 RX ADMIN — LIDOCAINE HYDROCHLORIDE AND EPINEPHRINE 5 ML: 20; 5 INJECTION, SOLUTION EPIDURAL; INFILTRATION; INTRACAUDAL; PERINEURAL at 18:25:00

## 2017-04-05 RX ADMIN — SODIUM CHLORIDE, SODIUM LACTATE, POTASSIUM CHLORIDE, CALCIUM CHLORIDE: 600; 310; 30; 20 INJECTION, SOLUTION INTRAVENOUS at 19:47:00

## 2017-04-05 RX ADMIN — MORPHINE SULFATE 3 MG: 1 INJECTION, SOLUTION EPIDURAL; INTRATHECAL; INTRAVENOUS at 19:00:00

## 2017-04-05 RX ADMIN — PHENYLEPHRINE HCL 200 MCG: 10 MG/ML VIAL (ML) INJECTION at 19:35:00

## 2017-04-05 NOTE — PLAN OF CARE
PT REMAINS IN LABOR WITH PITOCIN. REFUSES EPIDURAL, REMAINS VERY ANXIOUS AND DROWSY. EPIDURAL PROCESS EXPLAINED TO PT AND HER FATHER, PT CONTINUES TO REFUSE. PT DOES NOT RESPOND WELL TO DISCUSSION.

## 2017-04-05 NOTE — ANESTHESIA PROCEDURE NOTES
Labor Analgesia  Performed by: Demetrice Mcginnis by: Dayanna Foreman    Patient Location:  OB  Start Time:  4/5/2017 9:20 AM  End Time:  4/5/2017 9:27 AM  Site Identification: surface landmarks    Reason for Block: post-op pain management

## 2017-04-05 NOTE — H&P
[address] Patient Status:  Inpatient    1980 MRN [id number]   Location [address] C-D Attending Sher Florentino MD   Hosp Day # 1 PCP Loni Beck MD     Date of Admission:  2017 gravid nontender, EFW 7 1/2  lbs, vertex    Vaginal exam: Dilation: 8 cm    Effacement: 100 %    Station: +2    Consistency: soft    Position: anterior    Ellis score: n/a  Note: prior to epidural, 6 cm -- pt reluctant for epidural due to fear of needles answered; all appropriate consents will be signed at the [address] [place] [address]  4/5/2017  10:43 AM

## 2017-04-05 NOTE — ANESTHESIA PREPROCEDURE EVALUATION
Anesthesia PreOp Note    HPI:     Elizabeth Ruiz is a 31year old female who presents for preoperative consultation requested by: * No surgeons listed *    Date of Surgery: 4/5/2017    * No procedures listed *  Indication: * No pre-op diagnosis entered *    * No Duncan Drone Intravenous Continuous Wyatt Jenkins MD     Terbutaline Sulfate (BRETHINE) 1 MG/ML injection 0.25 mg 0.25 mg Subcutaneous PRN Wyatt Jenkins MD     citric acid-sodium citrate (BICITRA) solution 30 mL 30 mL Oral PRN Wyatt Jenkins MD     Ammonia Aromati summary reviewed and Nursing notes reviewed    Airway   Mallampati: I  TM distance: >3 FB  Neck ROM: full  Dental - normal exam     Pulmonary - negative ROS and normal exam   Cardiovascular - negative ROS and normal exam  Exercise tolerance: good    Neuro/

## 2017-04-06 PROBLEM — D50.0 ANEMIA DUE TO BLOOD LOSS: Status: ACTIVE | Noted: 2017-04-06

## 2017-04-06 PROCEDURE — 30233N1 TRANSFUSION OF NONAUTOLOGOUS RED BLOOD CELLS INTO PERIPHERAL VEIN, PERCUTANEOUS APPROACH: ICD-10-PCS | Performed by: OBSTETRICS & GYNECOLOGY

## 2017-04-06 RX ORDER — SODIUM CHLORIDE 0.9 % (FLUSH) 0.9 %
10 SYRINGE (ML) INJECTION AS NEEDED
Status: DISCONTINUED | OUTPATIENT
Start: 2017-04-06 | End: 2017-04-09

## 2017-04-06 RX ORDER — BISACODYL 10 MG
10 SUPPOSITORY, RECTAL RECTAL
Status: DISCONTINUED | OUTPATIENT
Start: 2017-04-06 | End: 2017-04-09

## 2017-04-06 RX ORDER — POLYETHYLENE GLYCOL 3350 17 G/17G
17 POWDER, FOR SOLUTION ORAL DAILY PRN
Status: DISCONTINUED | OUTPATIENT
Start: 2017-04-06 | End: 2017-04-09

## 2017-04-06 RX ORDER — SIMETHICONE 80 MG
80 TABLET,CHEWABLE ORAL 3 TIMES DAILY PRN
Status: DISCONTINUED | OUTPATIENT
Start: 2017-04-06 | End: 2017-04-09

## 2017-04-06 RX ORDER — SODIUM CHLORIDE 9 MG/ML
INJECTION, SOLUTION INTRAVENOUS
Status: DISPENSED
Start: 2017-04-06 | End: 2017-04-06

## 2017-04-06 RX ORDER — DOCUSATE SODIUM 100 MG/1
100 CAPSULE, LIQUID FILLED ORAL 2 TIMES DAILY
Status: DISCONTINUED | OUTPATIENT
Start: 2017-04-06 | End: 2017-04-09

## 2017-04-06 RX ORDER — ONDANSETRON 2 MG/ML
4 INJECTION INTRAMUSCULAR; INTRAVENOUS EVERY 6 HOURS PRN
Status: DISCONTINUED | OUTPATIENT
Start: 2017-04-06 | End: 2017-04-09

## 2017-04-06 RX ORDER — SODIUM CHLORIDE 9 MG/ML
INJECTION, SOLUTION INTRAVENOUS ONCE
Status: COMPLETED | OUTPATIENT
Start: 2017-04-06 | End: 2017-04-06

## 2017-04-06 RX ORDER — HYDROCODONE BITARTRATE AND ACETAMINOPHEN 7.5; 325 MG/1; MG/1
1 TABLET ORAL EVERY 4 HOURS PRN
Status: DISCONTINUED | OUTPATIENT
Start: 2017-04-06 | End: 2017-04-07

## 2017-04-06 RX ORDER — ZOLPIDEM TARTRATE 5 MG/1
5 TABLET ORAL NIGHTLY PRN
Status: DISCONTINUED | OUTPATIENT
Start: 2017-04-06 | End: 2017-04-09

## 2017-04-06 RX ORDER — PRENATAL VIT,CAL 76/IRON/FOLIC 29 MG-1 MG
1 TABLET ORAL DAILY
Status: DISCONTINUED | OUTPATIENT
Start: 2017-04-06 | End: 2017-04-09

## 2017-04-06 RX ORDER — KETOROLAC TROMETHAMINE 30 MG/ML
30 INJECTION, SOLUTION INTRAMUSCULAR; INTRAVENOUS EVERY 6 HOURS
Status: DISPENSED | OUTPATIENT
Start: 2017-04-06 | End: 2017-04-08

## 2017-04-06 RX ORDER — DOCUSATE SODIUM 100 MG/1
100 CAPSULE, LIQUID FILLED ORAL
Status: DISCONTINUED | OUTPATIENT
Start: 2017-04-06 | End: 2017-04-06

## 2017-04-06 RX ORDER — ACETAMINOPHEN 325 MG/1
650 TABLET ORAL EVERY 4 HOURS PRN
Status: DISCONTINUED | OUTPATIENT
Start: 2017-04-06 | End: 2017-04-09

## 2017-04-06 RX ORDER — DEXTROSE, SODIUM CHLORIDE, SODIUM LACTATE, POTASSIUM CHLORIDE, AND CALCIUM CHLORIDE 5; .6; .31; .03; .02 G/100ML; G/100ML; G/100ML; G/100ML; G/100ML
INJECTION, SOLUTION INTRAVENOUS CONTINUOUS
Status: DISCONTINUED | OUTPATIENT
Start: 2017-04-06 | End: 2017-04-09

## 2017-04-06 RX ORDER — GINSENG 100 MG
CAPSULE ORAL 3 TIMES DAILY
Status: DISCONTINUED | OUTPATIENT
Start: 2017-04-06 | End: 2017-04-09

## 2017-04-06 RX ORDER — HYDROCODONE BITARTRATE AND ACETAMINOPHEN 7.5; 325 MG/1; MG/1
2 TABLET ORAL EVERY 4 HOURS PRN
Status: DISCONTINUED | OUTPATIENT
Start: 2017-04-06 | End: 2017-04-09

## 2017-04-06 RX ORDER — SODIUM PHOSPHATE, DIBASIC AND SODIUM PHOSPHATE, MONOBASIC 7; 19 G/133ML; G/133ML
1 ENEMA RECTAL ONCE AS NEEDED
Status: ACTIVE | OUTPATIENT
Start: 2017-04-06 | End: 2017-04-06

## 2017-04-06 NOTE — DISCHARGE SUMMARY
[facility]    Discharge Summary    Janis Blair Patient Status:  Inpatient    1980 MRN [id number]   Location [address] C-D Attending Samanta Colunga MD   [facility] Day # 1       Delivering OB Clinician: Kyleigh    [facility]: Estimated Date

## 2017-04-06 NOTE — PROGRESS NOTES
Notified Dr. Pete Birmingham of documented CBC results, Hgb. 6.9. Received telephone orders/readback for 2 units PRBCs  each to run over 3 hours. Repeat CBC 6 hours after last completed infusion. Orders placed.

## 2017-04-06 NOTE — LACTATION NOTE
This note was copied from the chart of Shiv Vernon  LACTATION NOTE - INFANT    Evaluation Type  Evaluation Type: Inpatient    Problems & Assessment  Infant Assessment: Hunger cues present;Skin color: pink or appropriate for ethnicity  Muscle tone: Appropriate

## 2017-04-06 NOTE — PROGRESS NOTES
Notified Dr. Mahogany Leigh of patient's documented 0830 VS,  BP. 80/49, patient is c/o of feeling dizzy. Received telephone orders/readback of stat CBC now and Type and Screen. Page provider with results.

## 2017-04-06 NOTE — LACTATION NOTE
LACTATION NOTE - MOTHER           Problems identified  Problems identified: Knowledge deficit      Maternal history  Maternal history:  (low blood pressure, blood transfusion)    Breastfeeding goal  Breastfeeding goal: To maintain breast milk feeding per p

## 2017-04-06 NOTE — LACTATION NOTE
This note was copied from the chart of Shiv Vernon  LACTATION NOTE - INFANT    Evaluation Type  Evaluation Type: Inpatient    Problems & Assessment  Infant Assessment: Minimal hunger cues present;Skin color: pink or appropriate for ethnicity  Muscle tone: Shadia

## 2017-04-06 NOTE — LACTATION NOTE
LACTATION NOTE - MOTHER           Problems identified  Problems Identified Other:  (Unable to speak to mom due to sleepiness, ill.)    Maternal history  Maternal history:  (decreased bp, receiving blood)    Breastfeeding goal  Breastfeeding goal: To [place]

## 2017-04-06 NOTE — ANESTHESIA POSTPROCEDURE EVALUATION
Patient: Kayla Sam    Procedure Summary     Date Anesthesia Start Anesthesia Stop Room / Location    17 0910  300 [facility] L+D OR  [facility] L+D OR       Procedure Diagnosis Surgeon Responsible Provider     (N/A ) No diagnosis on file.  Shiela Garza,

## 2017-04-06 NOTE — PROGRESS NOTES
Received patient into room 357  Bedside shift report received from [facility] Patient transferred to bed from CART.  Bed in locked and low position.  Side rails up X2.  Vital signs stable, fundus FIRM at U/1, lochia SMALL, 0 clots.  IV site LH fluids DRL

## 2017-04-06 NOTE — OPERATIVE REPORT
[facility]    Post-Operative Note    Janis Blair Patient Status:  Inpatient    1980 MRN [id number]   Location [address] C-D Attending Samanta Colunga MD   Hosp Day # 1 PCP Loni Beck MD     Indications: failure to progre incised transversely and the bladder flap was bluntly freed from the lower uterine segment. A bladder blade was then inserted. A transverse uterine incision was made.  A viable male infant was delivered from vtx presentation with Apgar scores of 6 at one mi

## 2017-04-06 NOTE — PROGRESS NOTES
[facility]    OB/Gyne Post  Section Progress Note      Madelaine Hinojosa Patient Status:  Inpatient    1980 MRN [id number]   Location [facility] 3SE Attending Lydia Rowe MD   Hosp Day # 2 PCP MD Chika Segovia 12.0-16.0 g/dL   HCT 19.8 (L) 35.0-48.0 %   MCV 89.4 80.0-100.0 fL   MCH 31.4 27.0-32.0 pg   MCHC 35.1 32.0-37.0 g/dl   RDW 13.2 11.0-15.0 %    140-400 K/UL   MPV 8.8 7.4-10.3 fL   Neutrophil % 87 %   Lymphocyte % 9 %   Monocyte % 4 %   Eosinophil %

## 2017-04-07 PROCEDURE — 3E0234Z INTRODUCTION OF SERUM, TOXOID AND VACCINE INTO MUSCLE, PERCUTANEOUS APPROACH: ICD-10-PCS | Performed by: OBSTETRICS & GYNECOLOGY

## 2017-04-07 RX ORDER — HYDROCODONE BITARTRATE AND ACETAMINOPHEN 5; 325 MG/1; MG/1
1 TABLET ORAL EVERY 6 HOURS PRN
Status: DISCONTINUED | OUTPATIENT
Start: 2017-04-07 | End: 2017-04-09

## 2017-04-07 RX ORDER — 0.9 % SODIUM CHLORIDE 0.9 %
VIAL (ML) INJECTION
Status: DISPENSED
Start: 2017-04-07 | End: 2017-04-07

## 2017-04-07 NOTE — PROGRESS NOTES
Notified Dr. Carey Moise regarding patient status. Per Dr. Carey Moise maintain williamson until the morning. Pending results of CBC and patient status overnight.     Note scribed by Arielle Bryan RN

## 2017-04-07 NOTE — PROGRESS NOTES
[facility]    Post  Progress Note      Connor Davis Patient Status:  Inpatient    1980 MRN [id number]   Location [facility] 3SE Attending Hedy Giraldo MD   Hosp Day # 3 PCP Loni Beck MD       Subjective     Feels be

## 2017-04-07 NOTE — PROGRESS NOTES
Pt pressure dressing removed per orders Post Op day one and 2 small areas of blisters/skin tears noted bilaterally. Dr Lissette Martel notified via telephone and orders for Bacitracin received.

## 2017-04-08 RX ORDER — IBUPROFEN 600 MG/1
600 TABLET ORAL EVERY 6 HOURS PRN
Status: DISCONTINUED | OUTPATIENT
Start: 2017-04-08 | End: 2017-04-09

## 2017-04-08 RX ORDER — FUROSEMIDE 20 MG/1
10 TABLET ORAL ONCE
Status: COMPLETED | OUTPATIENT
Start: 2017-04-08 | End: 2017-04-08

## 2017-04-08 NOTE — LACTATION NOTE
LACTATION NOTE - MOTHER           Problems identified  Problems identified: Knowledge deficit  Problems Identified Other:  (Unable to speak to mom due to sleepiness, ill.)    Maternal history  Maternal history:  (low blood pressure, blood transfusion)    B

## 2017-04-08 NOTE — PLAN OF CARE
ANXIETY    • Will report anxiety at manageable levels Progressing        CARDIOVASCULAR - ADULT    • Maintains optimal cardiac output and hemodynamic stability Progressing    • Absence of cardiac arrhythmias or at baseline Progressing        NEUROLOGICAL -

## 2017-04-08 NOTE — PROGRESS NOTES
[facility] HOSP - [facility]    OB/Gyne Post  Section Progress Note      Rufino Oppenheim Patient Status:  Inpatient    1980 MRN [id number]   Location [facility] 3SE Attending Tesfaye Crow MD   Hosp Day # 4 PCP Asma M. Sharlet Boxer, MD Michaela Malone Assessment/Plan   POD# 3 with following issues: Patient Active Problem List:     History of primary TB     Cough     Zika virus exposure     Rh negative      Ultrasound     Non-English speaking patient     Threatened  labor     Pregnancy     A

## 2017-04-09 VITALS
OXYGEN SATURATION: 99 % | DIASTOLIC BLOOD PRESSURE: 68 MMHG | HEIGHT: 66 IN | TEMPERATURE: 98 F | HEART RATE: 76 BPM | WEIGHT: 125 LBS | BODY MASS INDEX: 20.09 KG/M2 | RESPIRATION RATE: 18 BRPM | SYSTOLIC BLOOD PRESSURE: 103 MMHG

## 2017-04-09 PROBLEM — Z98.891 S/P CESAREAN SECTION: Status: ACTIVE | Noted: 2017-04-09

## 2017-04-09 RX ORDER — HYDROCODONE BITARTRATE AND ACETAMINOPHEN 7.5; 325 MG/1; MG/1
2 TABLET ORAL EVERY 6 HOURS PRN
Qty: 30 TABLET | Refills: 0 | Status: SHIPPED | OUTPATIENT
Start: 2017-04-09 | End: 2017-06-08 | Stop reason: ALTCHOICE

## 2017-04-09 RX ORDER — IBUPROFEN 600 MG/1
600 TABLET ORAL EVERY 6 HOURS PRN
Qty: 30 TABLET | Refills: 0 | Status: SHIPPED | OUTPATIENT
Start: 2017-04-09 | End: 2018-04-03

## 2017-04-09 RX ORDER — BISACODYL 10 MG
10 SUPPOSITORY, RECTAL RECTAL
Qty: 30 SUPPOSITORY | Refills: 0 | Status: SHIPPED | OUTPATIENT
Start: 2017-04-09 | End: 2018-04-03

## 2017-04-09 NOTE — LACTATION NOTE
LACTATION NOTE - MOTHER           Problems identified  Problems identified: Knowledge deficit      Maternal history  Maternal history:  section    Breastfeeding goal  Breastfeeding goal: To maintain breast milk feeding per patient goal    Maternal

## 2017-04-09 NOTE — PROGRESS NOTES
Post-Partum Note   4/9/2017, 8:16 AM    Subjective:  Feeling well. Patient out of bed and walking around this morning. She has pain with movement but overall controlled. Does complain of continued bilateral swelling in her feet.  Denies headaches or vision

## 2017-04-12 NOTE — PAYOR COMM NOTE
Attending Physician: No att. providers found    Review Type: ADMISSION   Reviewer:  Joshua Neal       Date: April 12, 2017 - 2:06 PM  Payor: Anneliese Shadow Number: [id number]  Admit date: 4/4/2017  5:28 PM   Admitted from Emergency Dept.: file.    Social History:    Smoking status: Never Smoker     Smokeless tobacco: Never Used    Alcohol Use: No        Allergies/Medications:    Allergies:   No Known Allergies    Medications:    Prescriptions prior to admission:  Prenatal Multivit-Min-Fe-FA 5/3/17    Obstetrical history significant for Rh neg & Zika exposure.  Patient Active Problem List:     History of primary TB     Cough     Zika virus exposure     Rh negative      Ultrasound     Non-English speaking patient     Threatened  labor

## 2017-04-18 ENCOUNTER — OFFICE VISIT (OUTPATIENT)
Dept: OBGYN CLINIC | Facility: CLINIC | Age: 37
End: 2017-04-18

## 2017-04-18 ENCOUNTER — HOSPITAL ENCOUNTER (EMERGENCY)
Facility: HOSPITAL | Age: 37
Discharge: HOME | End: 2017-04-18
Payer: MEDICAID

## 2017-04-18 ENCOUNTER — APPOINTMENT (OUTPATIENT)
Dept: ULTRASOUND IMAGING | Facility: HOSPITAL | Age: 37
End: 2017-04-18
Attending: NURSE PRACTITIONER
Payer: MEDICAID

## 2017-04-18 ENCOUNTER — TELEPHONE (OUTPATIENT)
Dept: OBGYN CLINIC | Facility: CLINIC | Age: 37
End: 2017-04-18

## 2017-04-18 VITALS
TEMPERATURE: 98 F | HEART RATE: 78 BPM | BODY MASS INDEX: 20.91 KG/M2 | RESPIRATION RATE: 16 BRPM | DIASTOLIC BLOOD PRESSURE: 65 MMHG | SYSTOLIC BLOOD PRESSURE: 92 MMHG | OXYGEN SATURATION: 99 % | WEIGHT: 118 LBS | HEIGHT: 63 IN

## 2017-04-18 VITALS
HEART RATE: 92 BPM | WEIGHT: 107 LBS | TEMPERATURE: 98 F | SYSTOLIC BLOOD PRESSURE: 94 MMHG | BODY MASS INDEX: 17 KG/M2 | DIASTOLIC BLOOD PRESSURE: 66 MMHG

## 2017-04-18 DIAGNOSIS — Z98.891 STATUS POST CESAREAN DELIVERY: ICD-10-CM

## 2017-04-18 DIAGNOSIS — L76.82 INCISIONAL PAIN: Primary | ICD-10-CM

## 2017-04-18 DIAGNOSIS — M25.561 ACUTE PAIN OF RIGHT KNEE: Primary | ICD-10-CM

## 2017-04-18 DIAGNOSIS — R20.0 RIGHT LEG NUMBNESS: ICD-10-CM

## 2017-04-18 DIAGNOSIS — R20.2 PARESTHESIAS: ICD-10-CM

## 2017-04-18 PROCEDURE — 99213 OFFICE O/P EST LOW 20 MIN: CPT | Performed by: OBSTETRICS & GYNECOLOGY

## 2017-04-18 PROCEDURE — 99284 EMERGENCY DEPT VISIT MOD MDM: CPT

## 2017-04-18 PROCEDURE — 93971 EXTREMITY STUDY: CPT

## 2017-04-18 NOTE — TELEPHONE ENCOUNTER
Pt's father informed per below that pt will be sent to ER by CAP when she comes to appt this afternoon and states understanding. States wants to see CAP, informed CAP recommends pt to go ER for evaluation.   Father states is only doing what pt is requestin

## 2017-04-18 NOTE — ED PROVIDER NOTES
Patient Seen in: [facility] AND [facility] Emergency Department    History   Patient presents with:  Leg Pain    Stated Complaint: R leg numbness/swelling    HPI  Patient is a 19-year-old Swazi speaking only patients that presents to the emergency department w ED Triage Vitals   BP 04/18/17 1510 91/64 mmHg   Pulse 04/18/17 1510 82   Resp 04/18/17 1510 16   Temp 04/18/17 1510 98.4 °F (36.9 °C)   Temp src 04/18/17 1510 Temporal   SpO2 04/18/17 1510 99 %   O2 Device 04/18/17 1510 None (Room air)       Current:BP 98

## 2017-04-18 NOTE — TELEPHONE ENCOUNTER
Pt requesting speak with dad d/t language barrier. C/O \"whole left leg numbness and tingling\" since having  on 17. States there is no improvement. States the whole leg feels warmer than opposite leg and swelling.   Denies redness or pain to

## 2017-04-18 NOTE — ED INITIAL ASSESSMENT (HPI)
L knee numbness since 17 when she had a . States it has been getting worse since then. Denies trauma, pain, or disability.

## 2017-04-18 NOTE — PROGRESS NOTES
Sabino WRIGHT 1980       Patient presents with:  Gyn Problem  Pt delivered by  on 17 due to arrest of descent due to poor maternal effort with pushing.   She is here today with her father with c/o numbness extending from her right knee up seen today for gyn problem. Diagnoses and all orders for this visit:    Incisional pain    Right leg numbness    Status post  delivery      Pt sent to ER, charge nurse informed.

## 2017-04-19 ENCOUNTER — TELEPHONE (OUTPATIENT)
Dept: OBGYN CLINIC | Facility: CLINIC | Age: 37
End: 2017-04-19

## 2017-04-19 RX ORDER — CEFADROXIL 500 MG/1
500 CAPSULE ORAL 2 TIMES DAILY
Qty: 20 CAPSULE | Refills: 0 | Status: SHIPPED | OUTPATIENT
Start: 2017-04-19 | End: 2017-04-29

## 2017-05-24 ENCOUNTER — POSTPARTUM (OUTPATIENT)
Dept: OBGYN CLINIC | Facility: CLINIC | Age: 37
End: 2017-05-24

## 2017-05-24 VITALS
DIASTOLIC BLOOD PRESSURE: 66 MMHG | SYSTOLIC BLOOD PRESSURE: 94 MMHG | BODY MASS INDEX: 19 KG/M2 | WEIGHT: 104.63 LBS | HEART RATE: 92 BPM

## 2017-05-24 PROBLEM — Z34.90 PREGNANCY, OBSTETRICAL CARE: Status: RESOLVED | Noted: 2017-01-30 | Resolved: 2017-04-05

## 2017-05-24 PROBLEM — D50.0 ANEMIA DUE TO BLOOD LOSS: Status: RESOLVED | Noted: 2017-04-06 | Resolved: 2017-04-05

## 2017-05-24 PROBLEM — Z78.9 NON-ENGLISH SPEAKING PATIENT: Status: RESOLVED | Noted: 2017-01-30 | Resolved: 2017-04-05

## 2017-05-24 PROBLEM — Z34.90 PREGNANCY, OBSTETRICAL CARE (HCC): Status: RESOLVED | Noted: 2017-01-30 | Resolved: 2017-04-05

## 2017-05-24 PROCEDURE — 0503F POSTPARTUM CARE VISIT: CPT | Performed by: OBSTETRICS & GYNECOLOGY

## 2017-05-24 NOTE — PROGRESS NOTES
RUBINA    Becka Andres is a 31year old female  here for 6 week post-partum visit. Patient delivered a  male infant on 4/3/17. Patient desires nothing for contraception-[place]. Patient is bottle feeding.    Patient cathleen birthcontrol including ocps, minipill, Mirena or Paragard IUD, nuvaring, orthoevra patch, nexplanon, Depoprovera, condoms or tubal sterilization options. Patient has chosen to decline contraception.   Patient to return for annual gyne exam in November- pt w

## 2017-06-08 ENCOUNTER — LAB ENCOUNTER (OUTPATIENT)
Dept: LAB | Age: 37
End: 2017-06-08
Attending: FAMILY MEDICINE
Payer: MEDICAID

## 2017-06-08 ENCOUNTER — OFFICE VISIT (OUTPATIENT)
Dept: FAMILY MEDICINE CLINIC | Facility: CLINIC | Age: 37
End: 2017-06-08

## 2017-06-08 VITALS
TEMPERATURE: 98 F | SYSTOLIC BLOOD PRESSURE: 89 MMHG | HEART RATE: 109 BPM | HEIGHT: 63 IN | WEIGHT: 103 LBS | BODY MASS INDEX: 18.25 KG/M2 | DIASTOLIC BLOOD PRESSURE: 58 MMHG

## 2017-06-08 DIAGNOSIS — M54.9 UPPER BACK PAIN: ICD-10-CM

## 2017-06-08 DIAGNOSIS — R10.2 PELVIC PAIN: Primary | ICD-10-CM

## 2017-06-08 DIAGNOSIS — D50.9 IRON DEFICIENCY ANEMIA, UNSPECIFIED IRON DEFICIENCY ANEMIA TYPE: ICD-10-CM

## 2017-06-08 DIAGNOSIS — R53.83 FATIGUE, UNSPECIFIED TYPE: ICD-10-CM

## 2017-06-08 DIAGNOSIS — R10.2 PELVIC PAIN: ICD-10-CM

## 2017-06-08 PROCEDURE — 85025 COMPLETE CBC W/AUTO DIFF WBC: CPT

## 2017-06-08 PROCEDURE — 82607 VITAMIN B-12: CPT

## 2017-06-08 PROCEDURE — 81001 URINALYSIS AUTO W/SCOPE: CPT

## 2017-06-08 PROCEDURE — 99214 OFFICE O/P EST MOD 30 MIN: CPT | Performed by: FAMILY MEDICINE

## 2017-06-08 PROCEDURE — 82306 VITAMIN D 25 HYDROXY: CPT

## 2017-06-08 PROCEDURE — 83540 ASSAY OF IRON: CPT

## 2017-06-08 PROCEDURE — 84480 ASSAY TRIIODOTHYRONINE (T3): CPT

## 2017-06-08 PROCEDURE — 99212 OFFICE O/P EST SF 10 MIN: CPT | Performed by: FAMILY MEDICINE

## 2017-06-08 PROCEDURE — 84466 ASSAY OF TRANSFERRIN: CPT

## 2017-06-08 PROCEDURE — 82728 ASSAY OF FERRITIN: CPT

## 2017-06-08 PROCEDURE — 84443 ASSAY THYROID STIM HORMONE: CPT

## 2017-06-08 PROCEDURE — 36415 COLL VENOUS BLD VENIPUNCTURE: CPT

## 2017-06-08 PROCEDURE — 84439 ASSAY OF FREE THYROXINE: CPT

## 2017-06-08 RX ORDER — ACETAMINOPHEN 325 MG/1
325 TABLET ORAL EVERY 6 HOURS PRN
COMMUNITY
End: 2018-04-03

## 2017-06-08 RX ORDER — SERTRALINE HYDROCHLORIDE 25 MG/1
25 TABLET, FILM COATED ORAL DAILY
Qty: 30 TABLET | Refills: 0 | Status: SHIPPED | OUTPATIENT
Start: 2017-06-08 | End: 2018-04-03

## 2017-06-08 NOTE — PROGRESS NOTES
HPI:    Patient ID: Lexie Baires is a 31year old female. HPI     Patient here with father. Had a c- section 2 months ago. No fever. No problems with urinating. No diarrhea. Chronic constipation for yrs, takes prune juice.     Patient also mentions that EXAM:   Physical Exam   Constitutional: She appears well-developed and well-nourished. Cardiovascular: Normal rate, regular rhythm and normal heart sounds. Pulmonary/Chest: Effort normal and breath sounds normal.   Abdominal: Soft.  Bowel sounds are no

## 2017-06-11 PROBLEM — D50.9 IRON DEFICIENCY ANEMIA: Status: RESOLVED | Noted: 2017-06-08 | Resolved: 2017-06-11

## 2017-06-11 PROBLEM — Z34.90 PREGNANCY: Status: RESOLVED | Noted: 2017-03-25 | Resolved: 2017-06-11

## 2017-06-11 PROBLEM — O47.00 THREATENED PRETERM LABOR: Status: RESOLVED | Noted: 2017-02-05 | Resolved: 2017-06-11

## 2017-06-11 PROBLEM — R79.89 LOW TSH LEVEL: Status: ACTIVE | Noted: 2017-06-11

## 2017-06-11 PROBLEM — Z34.90 PREGNANCY (HCC): Status: RESOLVED | Noted: 2017-03-25 | Resolved: 2017-06-11

## 2017-06-11 PROBLEM — O47.00 THREATENED PRETERM LABOR (HCC): Status: RESOLVED | Noted: 2017-02-05 | Resolved: 2017-06-11

## 2017-06-11 PROBLEM — E55.9 VITAMIN D DEFICIENCY: Status: ACTIVE | Noted: 2017-06-11

## 2017-06-12 ENCOUNTER — TELEPHONE (OUTPATIENT)
Dept: FAMILY MEDICINE CLINIC | Facility: CLINIC | Age: 37
End: 2017-06-12

## 2017-06-12 NOTE — TELEPHONE ENCOUNTER
PATIENT FATHER WANTS TO KNOW WHAT IS WRONG WITH PATIENT AND WHY SHE NEEDS TO SEE A SPECIALIST.   ASKING FOR CALL BACK FROM DR OR NURSE

## 2017-06-13 ENCOUNTER — OFFICE VISIT (OUTPATIENT)
Dept: ENDOCRINOLOGY CLINIC | Facility: CLINIC | Age: 37
End: 2017-06-13

## 2017-06-13 VITALS
WEIGHT: 102.19 LBS | HEIGHT: 63 IN | BODY MASS INDEX: 18.11 KG/M2 | DIASTOLIC BLOOD PRESSURE: 46 MMHG | HEART RATE: 106 BPM | SYSTOLIC BLOOD PRESSURE: 81 MMHG

## 2017-06-13 DIAGNOSIS — R79.89 LOW TSH LEVEL: Primary | ICD-10-CM

## 2017-06-13 DIAGNOSIS — E06.9 THYROIDITIS: ICD-10-CM

## 2017-06-13 PROCEDURE — 99212 OFFICE O/P EST SF 10 MIN: CPT | Performed by: INTERNAL MEDICINE

## 2017-06-13 PROCEDURE — 99243 OFF/OP CNSLTJ NEW/EST LOW 30: CPT | Performed by: INTERNAL MEDICINE

## 2017-06-13 NOTE — H&P
New Patient Evaluation - History and Physical    CONSULT - Reason for Visit:  Low TSH   Requesting Physician: Nhan Foster MD    CHIEF COMPLAINT:    Post partum thyroiditis.       HISTORY OF PRESENT ILLNESS:   Doug Shelley is a 31year old female who presents for e opne heart sx w/valve replacement       ASSESSMENTS:       REVIEW OF SYSTEMS:  Constitutional: Negative for: weight change, fever, fatigue, cold/heat intolerance  Eyes: Negative for:  Visual changes, proptosis, blurring  ENT: Negative for:  dysphagia, ne 31year old female with low TSH with normal FT3 and FT4  She delivered a baby boy in 2017. Postpartum thyroiditis is a destructive thyroiditis induced by an autoimmune mechanism within one year after .     Approximately 20 to 30 percent

## 2017-06-14 PROBLEM — I95.9 HYPOTENSION: Status: ACTIVE | Noted: 2017-06-14

## 2017-06-14 PROBLEM — R00.0 TACHYCARDIA: Status: ACTIVE | Noted: 2017-06-14

## 2018-04-03 ENCOUNTER — TELEPHONE (OUTPATIENT)
Dept: OBGYN CLINIC | Facility: CLINIC | Age: 38
End: 2018-04-03

## 2018-04-03 ENCOUNTER — OFFICE VISIT (OUTPATIENT)
Dept: FAMILY MEDICINE CLINIC | Facility: CLINIC | Age: 38
End: 2018-04-03

## 2018-04-03 VITALS
TEMPERATURE: 98 F | WEIGHT: 121.81 LBS | BODY MASS INDEX: 21.58 KG/M2 | HEIGHT: 63 IN | DIASTOLIC BLOOD PRESSURE: 69 MMHG | SYSTOLIC BLOOD PRESSURE: 101 MMHG | HEART RATE: 76 BPM

## 2018-04-03 DIAGNOSIS — Z34.93 PREGNANT AND NOT YET DELIVERED IN THIRD TRIMESTER: Primary | ICD-10-CM

## 2018-04-03 PROCEDURE — 99214 OFFICE O/P EST MOD 30 MIN: CPT | Performed by: FAMILY MEDICINE

## 2018-04-03 PROCEDURE — 99212 OFFICE O/P EST SF 10 MIN: CPT | Performed by: FAMILY MEDICINE

## 2018-04-03 RX ORDER — PNV NO.95/FERROUS FUM/FOLIC AC 28MG-0.8MG
1 TABLET ORAL DAILY
Refills: 11 | COMMUNITY
Start: 2018-02-02 | End: 2021-11-10 | Stop reason: ALTCHOICE

## 2018-04-03 NOTE — TELEPHONE ENCOUNTER
Informed pt's father that pt is not being accepted with our group per  AMAURI AGUILAR and ZEESHAN. Asked pt's father if pt wanted to  records she had just left at the . He stated pt will  the records.      (RN in our office stated that JEFF s

## 2018-04-03 NOTE — PROGRESS NOTES
HPI:    Patient ID: Lexie Baires is a 30year old female. HPI     Patient presents with:  Pregnancy: pt was going to [address], last visit 3/26,     Patient is here with her father   is in [place].   Patient is pregnant, lmp ASSESSMENT/PLAN:   Pregnant and not yet delivered in third trimester  (primary encounter diagnosis)      Reviewed records from [facility] that patient and her father have brought in. These are very limited records.   It appears per her obstetrics physic

## 2018-04-03 NOTE — TELEPHONE ENCOUNTER
Received call from Dr. Gale Madera stating that pt presented to her office today requesting to transfer her PN care to our office. Dr. Gale Madera stated that pt currently has PN care with [facility] Clarence Alfonso.  Pt delivered with our practice via

## 2018-04-04 ENCOUNTER — TELEPHONE (OUTPATIENT)
Dept: OBGYN CLINIC | Facility: CLINIC | Age: 38
End: 2018-04-04

## 2018-04-04 NOTE — TELEPHONE ENCOUNTER
Pt presented to office as walk-in to see if we can accept her into care, accompanied by her father, who translates for her. Per her chart she has already been declined as a transfer of care by other [facility] provider.    According to limited records pt is 46

## 2021-11-01 ENCOUNTER — HOSPITAL ENCOUNTER (EMERGENCY)
Facility: HOSPITAL | Age: 41
Discharge: HOME | End: 2021-11-02
Attending: EMERGENCY MEDICINE
Payer: MEDICAID

## 2021-11-01 VITALS
HEIGHT: 63 IN | OXYGEN SATURATION: 100 % | TEMPERATURE: 98 F | BODY MASS INDEX: 22.15 KG/M2 | HEART RATE: 76 BPM | DIASTOLIC BLOOD PRESSURE: 64 MMHG | SYSTOLIC BLOOD PRESSURE: 94 MMHG | WEIGHT: 125 LBS | RESPIRATION RATE: 18 BRPM

## 2021-11-01 DIAGNOSIS — K04.7 DENTAL ABSCESS: Primary | ICD-10-CM

## 2021-11-01 PROCEDURE — 99283 EMERGENCY DEPT VISIT LOW MDM: CPT

## 2021-11-01 PROCEDURE — 41800 DRAINAGE OF GUM LESION: CPT

## 2021-11-01 RX ORDER — IBUPROFEN 600 MG/1
600 TABLET ORAL EVERY 8 HOURS PRN
Qty: 30 TABLET | Refills: 0 | Status: SHIPPED | OUTPATIENT
Start: 2021-11-01 | End: 2021-11-10

## 2021-11-01 RX ORDER — LIDOCAINE HYDROCHLORIDE 10 MG/ML
5 INJECTION, SOLUTION EPIDURAL; INFILTRATION; INTRACAUDAL; PERINEURAL ONCE
Status: COMPLETED | OUTPATIENT
Start: 2021-11-01 | End: 2021-11-01

## 2021-11-01 RX ORDER — PENICILLIN V POTASSIUM 500 MG/1
500 TABLET ORAL 4 TIMES DAILY
Qty: 40 TABLET | Refills: 0 | Status: SHIPPED | OUTPATIENT
Start: 2021-11-01 | End: 2021-11-01

## 2021-11-01 RX ORDER — CLINDAMYCIN HYDROCHLORIDE 150 MG/1
150 CAPSULE ORAL 3 TIMES DAILY
Qty: 30 CAPSULE | Refills: 0 | Status: SHIPPED | OUTPATIENT
Start: 2021-11-01 | End: 2021-11-11

## 2021-11-02 ENCOUNTER — TELEPHONE (OUTPATIENT)
Dept: FAMILY MEDICINE CLINIC | Facility: CLINIC | Age: 41
End: 2021-11-02

## 2021-11-02 NOTE — TELEPHONE ENCOUNTER
It appears patient was seen in the emergency room for dental abscess. Patient would need to follow-up with a dentist/orthodontist  Patient has not been seen in the clinic for over 3 years.

## 2021-11-02 NOTE — ED PROVIDER NOTES
Patient Seen in: [facility] AND [facility] Emergency Department    History   Patient presents with:  Dental Problem      HPI    The patient presents to the ED after being sent to the ER by her dentist for swelling and pain to her right cheek and lower jaw.   Frida the exam.  Stethoscope and any equipment used during my examination was cleaned with super sani-cloth germicidal wipes following the exam.     Physical Exam  Vitals and nursing note reviewed. Constitutional:       General: She is not in acute distress. pertinent problems on file. to contribute to the complexity of this ED evaluation. ED Course: Patient presents to the ED with a dental abscess to her right lower first molar.   This was drained by myself and patient started on oral antibiotics and advise

## 2021-11-02 NOTE — TELEPHONE ENCOUNTER
Pt asking if she can get earlier appt for ER follow up with Dr. Fred Villar available is 11/10 but pt stating she would like to be seen sooner if possible due to the pain in her mouth. Please advise.

## 2021-11-02 NOTE — TELEPHONE ENCOUNTER
The patient was called and informed. She did not want to see any other doctor. I offered with another provider and was refused. She will also follow up with a dentist.    Was informed she is a new patient to the clinic with understanding    .   Future

## 2021-11-10 ENCOUNTER — LAB ENCOUNTER (OUTPATIENT)
Dept: LAB | Age: 41
End: 2021-11-10
Attending: FAMILY MEDICINE
Payer: MEDICAID

## 2021-11-10 ENCOUNTER — OFFICE VISIT (OUTPATIENT)
Dept: FAMILY MEDICINE CLINIC | Facility: CLINIC | Age: 41
End: 2021-11-10
Payer: MEDICAID

## 2021-11-10 VITALS
WEIGHT: 105 LBS | SYSTOLIC BLOOD PRESSURE: 90 MMHG | HEART RATE: 90 BPM | DIASTOLIC BLOOD PRESSURE: 58 MMHG | TEMPERATURE: 98 F | OXYGEN SATURATION: 96 % | BODY MASS INDEX: 18.61 KG/M2 | HEIGHT: 63 IN

## 2021-11-10 DIAGNOSIS — K04.7 DENTAL ABSCESS: Primary | ICD-10-CM

## 2021-11-10 DIAGNOSIS — R00.2 PALPITATIONS: ICD-10-CM

## 2021-11-10 DIAGNOSIS — R22.0 FACIAL SWELLING: ICD-10-CM

## 2021-11-10 PROCEDURE — 93010 ELECTROCARDIOGRAM REPORT: CPT | Performed by: FAMILY MEDICINE

## 2021-11-10 PROCEDURE — 93005 ELECTROCARDIOGRAM TRACING: CPT

## 2021-11-10 PROCEDURE — 36415 COLL VENOUS BLD VENIPUNCTURE: CPT

## 2021-11-10 PROCEDURE — 80053 COMPREHEN METABOLIC PANEL: CPT

## 2021-11-10 PROCEDURE — 85025 COMPLETE CBC W/AUTO DIFF WBC: CPT

## 2021-11-10 PROCEDURE — 84439 ASSAY OF FREE THYROXINE: CPT

## 2021-11-10 PROCEDURE — 99203 OFFICE O/P NEW LOW 30 MIN: CPT | Performed by: FAMILY MEDICINE

## 2021-11-10 PROCEDURE — 3078F DIAST BP <80 MM HG: CPT | Performed by: FAMILY MEDICINE

## 2021-11-10 PROCEDURE — 84443 ASSAY THYROID STIM HORMONE: CPT

## 2021-11-10 PROCEDURE — 3074F SYST BP LT 130 MM HG: CPT | Performed by: FAMILY MEDICINE

## 2021-11-10 PROCEDURE — 3008F BODY MASS INDEX DOCD: CPT | Performed by: FAMILY MEDICINE

## 2021-11-10 RX ORDER — AMOXICILLIN 500 MG/1
500 CAPSULE ORAL EVERY 8 HOURS
COMMUNITY
Start: 2021-11-01 | End: 2021-11-10 | Stop reason: ALTCHOICE

## 2021-11-10 RX ORDER — PENICILLIN V POTASSIUM 500 MG/1
TABLET ORAL
COMMUNITY
Start: 2021-11-02 | End: 2021-11-10 | Stop reason: ALTCHOICE

## 2021-11-10 NOTE — PROGRESS NOTES
HPI:    Patient ID: Denae Lawrence is a 47year old female.     HPI  Patient presents with:  ER F/U: went to [address] [place] [address] 11-1-21 for Dental abscess still having pain on right side of face and throat   Chest Pain    Patient seen in the emergency room on November 1 after bein normal.      Mouth/Throat:      Comments: Slight facial swelling noted on the right side compared to the left. No redness. She is tender on the right jaw. She also tender in the right side of the neck and has some cervical lymphadenopathy.   Aftab Ayala

## 2021-11-13 DIAGNOSIS — R79.89 ELEVATED TSH: ICD-10-CM

## 2021-11-13 DIAGNOSIS — D64.9 MILD ANEMIA: Primary | ICD-10-CM

## 2021-11-15 ENCOUNTER — TELEPHONE (OUTPATIENT)
Dept: FAMILY MEDICINE CLINIC | Facility: CLINIC | Age: 41
End: 2021-11-15

## 2021-11-15 DIAGNOSIS — R00.2 PALPITATIONS: Primary | ICD-10-CM

## 2021-11-16 NOTE — TELEPHONE ENCOUNTER
With Leo  QR#[id number]      Patient was called to discuss lab results and EKG. Please also see EKG result from 11/10/21. Patient asking for referral for Cardiologist.    Patient asking if  can refill medication Clindamycin.  Patient took

## 2021-11-16 NOTE — TELEPHONE ENCOUNTER
Spoke with patient via [address]  ID# [id number]. Relayed PCP message. Cardiology telephone number provided to patient. Patient states she will go and see a dentist. Patient verbalized understanding.

## 2021-11-16 NOTE — PROGRESS NOTES
With Language Line  Kingsley Saglado ID# [id number]     ( Leo )   Left message to call back       Referral for Dr. Delfina Hoff [phone] has been placed

## 2021-11-16 NOTE — TELEPHONE ENCOUNTER
Patient was told she needed to see dentist.  Otherwise needs to see ent   I will place referral for cardiologist

## 2021-12-16 ENCOUNTER — HOSPITAL ENCOUNTER (EMERGENCY)
Facility: HOSPITAL | Age: 41
Discharge: HOME | End: 2021-12-17
Payer: MEDICAID

## 2021-12-16 ENCOUNTER — APPOINTMENT (OUTPATIENT)
Dept: CT IMAGING | Facility: HOSPITAL | Age: 41
End: 2021-12-16
Attending: NURSE PRACTITIONER
Payer: MEDICAID

## 2021-12-16 ENCOUNTER — APPOINTMENT (OUTPATIENT)
Dept: GENERAL RADIOLOGY | Facility: HOSPITAL | Age: 41
End: 2021-12-16
Attending: NURSE PRACTITIONER
Payer: MEDICAID

## 2021-12-16 DIAGNOSIS — R07.9 ACUTE CHEST PAIN: Primary | ICD-10-CM

## 2021-12-16 PROCEDURE — 85025 COMPLETE CBC W/AUTO DIFF WBC: CPT | Performed by: NURSE PRACTITIONER

## 2021-12-16 PROCEDURE — 80076 HEPATIC FUNCTION PANEL: CPT | Performed by: NURSE PRACTITIONER

## 2021-12-16 PROCEDURE — 93010 ELECTROCARDIOGRAM REPORT: CPT | Performed by: INTERNAL MEDICINE

## 2021-12-16 PROCEDURE — 83880 ASSAY OF NATRIURETIC PEPTIDE: CPT | Performed by: NURSE PRACTITIONER

## 2021-12-16 PROCEDURE — 80048 BASIC METABOLIC PNL TOTAL CA: CPT | Performed by: NURSE PRACTITIONER

## 2021-12-16 PROCEDURE — 93005 ELECTROCARDIOGRAM TRACING: CPT

## 2021-12-16 PROCEDURE — 99285 EMERGENCY DEPT VISIT HI MDM: CPT

## 2021-12-16 PROCEDURE — 84484 ASSAY OF TROPONIN QUANT: CPT | Performed by: NURSE PRACTITIONER

## 2021-12-16 PROCEDURE — 84703 CHORIONIC GONADOTROPIN ASSAY: CPT | Performed by: NURSE PRACTITIONER

## 2021-12-16 PROCEDURE — 74175 CTA ABDOMEN W/CONTRAST: CPT | Performed by: NURSE PRACTITIONER

## 2021-12-16 PROCEDURE — 71275 CT ANGIOGRAPHY CHEST: CPT | Performed by: NURSE PRACTITIONER

## 2021-12-16 PROCEDURE — 71045 X-RAY EXAM CHEST 1 VIEW: CPT | Performed by: NURSE PRACTITIONER

## 2021-12-16 PROCEDURE — 96361 HYDRATE IV INFUSION ADD-ON: CPT

## 2021-12-16 PROCEDURE — 96360 HYDRATION IV INFUSION INIT: CPT

## 2021-12-17 ENCOUNTER — TELEMEDICINE (OUTPATIENT)
Dept: FAMILY MEDICINE CLINIC | Facility: CLINIC | Age: 41
End: 2021-12-17
Payer: MEDICAID

## 2021-12-17 ENCOUNTER — TELEPHONE (OUTPATIENT)
Dept: FAMILY MEDICINE CLINIC | Facility: CLINIC | Age: 41
End: 2021-12-17

## 2021-12-17 VITALS
BODY MASS INDEX: 19 KG/M2 | HEART RATE: 58 BPM | DIASTOLIC BLOOD PRESSURE: 65 MMHG | OXYGEN SATURATION: 97 % | SYSTOLIC BLOOD PRESSURE: 90 MMHG | TEMPERATURE: 99 F | RESPIRATION RATE: 23 BRPM | WEIGHT: 105 LBS

## 2021-12-17 DIAGNOSIS — F41.9 ANXIETY: ICD-10-CM

## 2021-12-17 DIAGNOSIS — R07.89 CHEST WALL PAIN: Primary | ICD-10-CM

## 2021-12-17 PROCEDURE — 99214 OFFICE O/P EST MOD 30 MIN: CPT | Performed by: NURSE PRACTITIONER

## 2021-12-17 RX ORDER — ACETAMINOPHEN 500 MG
1000 TABLET ORAL ONCE
Status: COMPLETED | OUTPATIENT
Start: 2021-12-17 | End: 2021-12-17

## 2021-12-17 RX ORDER — CYCLOBENZAPRINE HCL 5 MG
TABLET ORAL
Qty: 30 TABLET | Refills: 0 | Status: SHIPPED | OUTPATIENT
Start: 2021-12-17

## 2021-12-17 RX ORDER — ACETAMINOPHEN 500 MG
500 TABLET ORAL EVERY 4 HOURS PRN
Qty: 100 TABLET | Refills: 0 | Status: SHIPPED | OUTPATIENT
Start: 2021-12-17 | End: 2021-12-24

## 2021-12-17 NOTE — PATIENT INSTRUCTIONS
Chest Wall Pain: Costochondritis    The chest pain that you have had today is caused by costochondritis. This condition is caused by an inflammation of the cartilage joining your ribs to your breastbone. It's not caused by heart or lung problems.  Your he provider, or as advised. When to seek medical advice  Call your healthcare provider right away if any of these occur:  · A change in the type of pain.  Call if it feels different, becomes more serious, lasts longer, or spreads into your shoulder, arm, Dee Hamilton

## 2021-12-17 NOTE — PROGRESS NOTES
HPI  Pt here for chest pain-went to ER yesterday. Is taking tylenol for pain. Allergy to ibuprofen. Still having left chest wall pain-hurts to move left arm or hold anything in hand.      Review of Systems   Constitutional: Positive for activity guido Not Asked        Back Care: Not Asked        Exercise: Not Asked        Bike Helmet: Not Asked        Seat Belt: Not Asked        Self-Exams: Not Asked    Social History Narrative      Not on file    Social Determinants of Health  Financial Resource Strain interactive audio and video communication.  This has been done in good cain to provide continuity of care in the best interest of the provider-patient relationship, due to the COVID -19 public health crisis/national emergency where restrictions of face-to-

## 2021-12-17 NOTE — TELEPHONE ENCOUNTER
Spoke to patient. She was in the ER yesterday for chest pain. She was told to follow up in 2 days. She is still feeling pain when she takes a deep breath and is having generalized fatigue. Patient was difficult to understand at times.      Scheduled an ER f

## 2021-12-17 NOTE — ED PROVIDER NOTES
Patient Seen in: [facility] AND [facility] Emergency Department      History   Patient presents with:  Chest Pain    Stated Complaint: chest pain    Subjective:   42yo/f w hx of tuberculosis s/p curative therapy reports to the ED with left side chest pain.  Start Normal breath sounds. Chest:      Chest wall: No mass or deformity. Abdominal:      General: Bowel sounds are normal.      Palpations: Abdomen is soft. Musculoskeletal:         General: No tenderness or deformity. Normal range of motion.       Cervica versus scarring. Lungs are otherwise clear. There are no pleural effusions. No pneumothorax. Visualized abdomen is unremarkable. Osseous structures are unremarkable. FINDINGS:   CARDIAC: The heart is not enlarged.  There is no bowing of the obstruction.     VASCULATURE:   No aneurysm is detected. No dissection flap is apparent. RETROPERITONEUM: No mass or lymphadenopathy is apparent.     BONES:   Mild scoliosis is present. Trace multilevel degenerative changes of the spine are appreciated. encounter diagnosis)     Disposition:  Discharge  12/17/2021  1:13 am    Follow-up:  Elias Blount MD  [facility] [address] [place] [address] [phone]    In 2 day

## 2021-12-17 NOTE — ED INITIAL ASSESSMENT (HPI)
PT presents to ED with concerns of severe left-sided chest pain; also complaining of left lower abdominal pain

## 2021-12-17 NOTE — ASSESSMENT & PLAN NOTE
Ice to chest wall 20 min 4-6 times per day  May use flexeril 5 mg 1/2 to 1 tab po tid prn-discussed that this can cause drowsiness and to use cautiously  Follow up w Dr Miguel Cisneros in 2-3 days

## 2021-12-20 ENCOUNTER — TELEPHONE (OUTPATIENT)
Dept: FAMILY MEDICINE CLINIC | Facility: CLINIC | Age: 41
End: 2021-12-20

## 2021-12-20 NOTE — TELEPHONE ENCOUNTER
The patient stated her left side pain is the same. As seen in the ED on 12/16/21 and Gavino LEE on 12/17/21  The patient stated the flexeril and tylenol are not working. Per notes to follow up with her primary doctor.     She has 10/10 pa

## 2021-12-22 ENCOUNTER — OFFICE VISIT (OUTPATIENT)
Dept: FAMILY MEDICINE CLINIC | Facility: CLINIC | Age: 41
End: 2021-12-22
Payer: MEDICAID

## 2021-12-22 VITALS
HEIGHT: 63 IN | OXYGEN SATURATION: 100 % | BODY MASS INDEX: 18.07 KG/M2 | DIASTOLIC BLOOD PRESSURE: 62 MMHG | WEIGHT: 102 LBS | SYSTOLIC BLOOD PRESSURE: 95 MMHG | TEMPERATURE: 97 F | HEART RATE: 85 BPM

## 2021-12-22 DIAGNOSIS — E87.6 HYPOKALEMIA: ICD-10-CM

## 2021-12-22 DIAGNOSIS — D64.9 MILD ANEMIA: ICD-10-CM

## 2021-12-22 DIAGNOSIS — R07.9 LEFT-SIDED CHEST PAIN: Primary | ICD-10-CM

## 2021-12-22 DIAGNOSIS — R07.81 PLEURITIC PAIN: ICD-10-CM

## 2021-12-22 DIAGNOSIS — Z86.11 HISTORY OF TUBERCULOSIS: ICD-10-CM

## 2021-12-22 DIAGNOSIS — F43.9 STRESS AT HOME: ICD-10-CM

## 2021-12-22 PROBLEM — I95.9 HYPOTENSION: Status: RESOLVED | Noted: 2017-06-14 | Resolved: 2021-12-22

## 2021-12-22 PROBLEM — R00.0 TACHYCARDIA: Status: RESOLVED | Noted: 2017-06-14 | Resolved: 2021-12-22

## 2021-12-22 PROBLEM — R07.89 CHEST WALL PAIN: Status: RESOLVED | Noted: 2021-12-17 | Resolved: 2021-12-22

## 2021-12-22 PROBLEM — Z34.93 PREGNANT AND NOT YET DELIVERED IN THIRD TRIMESTER (HCC): Status: RESOLVED | Noted: 2017-03-25 | Resolved: 2021-12-22

## 2021-12-22 PROBLEM — Z34.93 PREGNANT AND NOT YET DELIVERED IN THIRD TRIMESTER: Status: RESOLVED | Noted: 2017-03-25 | Resolved: 2021-12-22

## 2021-12-22 PROCEDURE — 99214 OFFICE O/P EST MOD 30 MIN: CPT | Performed by: FAMILY MEDICINE

## 2021-12-22 PROCEDURE — 3008F BODY MASS INDEX DOCD: CPT | Performed by: FAMILY MEDICINE

## 2021-12-22 PROCEDURE — 3074F SYST BP LT 130 MM HG: CPT | Performed by: FAMILY MEDICINE

## 2021-12-22 PROCEDURE — 3078F DIAST BP <80 MM HG: CPT | Performed by: FAMILY MEDICINE

## 2021-12-22 RX ORDER — METHYLPREDNISOLONE 4 MG/1
TABLET ORAL
Qty: 1 EACH | Refills: 0 | Status: SHIPPED | OUTPATIENT
Start: 2021-12-22 | End: 2022-01-06

## 2021-12-22 NOTE — PROGRESS NOTES
HPI:    Patient ID: Carolyn Rubin is a 46year old female. HPI  Patient presents with: Follow - Up: on chest pain getting worst pt states pain travels to left upper back, tingling in fingers from left hand  shortness of breath.      Patient with complains of ha - 7.70 x10(3) uL 5.89   Lymphocytes Absolute      1.00 - 4.00 x10(3) uL 1.05   Monocytes Absolute      0.10 - 1.00 x10(3) uL 0.51   Eosinophils Absolute      0.00 - 0.70 x10(3) uL 0.25   Basophils Absolute      0.00 - 0.20 x10(3) uL 0.04   Immature Granulo to 1 tab tid prn for muscle pain-will cause drowsiness. 30 tablet 0     Allergies:  Ibuprofen               OTHER (SEE COMMENTS)    Comment:Stomach discomfort   PHYSICAL EXAM:   Patient presents with:   Follow - Up: on chest pain getting worst pt states stephania Sig: As directed.        Imaging & Referrals:  None       OK#[id number]

## 2022-01-06 ENCOUNTER — OFFICE VISIT (OUTPATIENT)
Dept: FAMILY MEDICINE CLINIC | Facility: CLINIC | Age: 42
End: 2022-01-06
Payer: MEDICAID

## 2022-01-06 VITALS
BODY MASS INDEX: 17.89 KG/M2 | HEIGHT: 63 IN | HEART RATE: 92 BPM | OXYGEN SATURATION: 97 % | WEIGHT: 101 LBS | DIASTOLIC BLOOD PRESSURE: 65 MMHG | SYSTOLIC BLOOD PRESSURE: 98 MMHG

## 2022-01-06 DIAGNOSIS — R79.89 LOW TSH LEVEL: ICD-10-CM

## 2022-01-06 DIAGNOSIS — R10.84 GENERALIZED ABDOMINAL PAIN: ICD-10-CM

## 2022-01-06 DIAGNOSIS — F43.9 STRESS AT HOME: ICD-10-CM

## 2022-01-06 DIAGNOSIS — R07.89 CHEST WALL PAIN: Primary | ICD-10-CM

## 2022-01-06 DIAGNOSIS — F41.9 ANXIETY: ICD-10-CM

## 2022-01-06 DIAGNOSIS — Z86.11 HISTORY OF PRIMARY TB: ICD-10-CM

## 2022-01-06 DIAGNOSIS — J45.20 MILD INTERMITTENT REACTIVE AIRWAY DISEASE WITHOUT COMPLICATION: ICD-10-CM

## 2022-01-06 DIAGNOSIS — R07.81 PLEURITIC PAIN: ICD-10-CM

## 2022-01-06 DIAGNOSIS — B00.1 HERPES SIMPLEX LABIALIS: ICD-10-CM

## 2022-01-06 PROBLEM — J45.909 REACTIVE AIRWAY DISEASE (HCC): Status: ACTIVE | Noted: 2022-01-06

## 2022-01-06 PROBLEM — J45.909 REACTIVE AIRWAY DISEASE: Status: ACTIVE | Noted: 2022-01-06

## 2022-01-06 PROCEDURE — 3074F SYST BP LT 130 MM HG: CPT | Performed by: FAMILY MEDICINE

## 2022-01-06 PROCEDURE — 3078F DIAST BP <80 MM HG: CPT | Performed by: FAMILY MEDICINE

## 2022-01-06 PROCEDURE — 3008F BODY MASS INDEX DOCD: CPT | Performed by: FAMILY MEDICINE

## 2022-01-06 PROCEDURE — 99214 OFFICE O/P EST MOD 30 MIN: CPT | Performed by: FAMILY MEDICINE

## 2022-01-06 RX ORDER — ALBUTEROL SULFATE 90 UG/1
2 AEROSOL, METERED RESPIRATORY (INHALATION) EVERY 6 HOURS PRN
Qty: 1 EACH | Refills: 2 | Status: SHIPPED | OUTPATIENT
Start: 2022-01-06

## 2022-01-06 RX ORDER — VALACYCLOVIR HYDROCHLORIDE 1 G/1
2000 TABLET, FILM COATED ORAL EVERY 12 HOURS SCHEDULED
Qty: 4 TABLET | Refills: 3 | Status: SHIPPED | OUTPATIENT
Start: 2022-01-06 | End: 2022-01-07

## 2022-01-06 NOTE — PROGRESS NOTES
HPI:    Patient ID: Allie Cooper is a 46year old female.     HPI  Patient presents with:  Chest Pain: since last month pt states her BP was low yesterday 50/81  Abdominal Pain  Medication Request: inhaler   Mouth Cold Sores    Patient seen 12/22  Was given medrol BMI 17.89 kg/m²          Current Outpatient Medications   Medication Sig Dispense Refill   • albuterol 108 (90 Base) MCG/ACT Inhalation Aero Soln Inhale 2 puffs into the lungs every 6 (six) hours as needed for Wheezing or Shortness of Breath.  1 each 2   • every 12 (twelve) hours for 1 day. Dispense: 4 tablet; Refill: 3    5. Anxiety      6. Stress at home      7. Low TSH level    - TSH W REFLEX TO FREE T4    8.  Generalized abdominal pain  Mild exam normal  Avoid spicy foods as well as fried foods  - CBC WI

## 2022-01-26 ENCOUNTER — TELEPHONE (OUTPATIENT)
Dept: CASE MANAGEMENT | Age: 42
End: 2022-01-26

## 2022-01-26 DIAGNOSIS — R07.89 CHEST WALL PAIN: Primary | ICD-10-CM

## 2022-01-26 NOTE — TELEPHONE ENCOUNTER
Homa Caicedo,    The health plan has denied your request for CT of the chest (54067). You can do a peer to peer. Ph. [id number] option 4  CASE # [id number]  You have 7 calendar days to complete. Please advise plan of care.     Thank you,  Joselyn Osman

## 2022-02-08 ENCOUNTER — TELEPHONE (OUTPATIENT)
Dept: FAMILY MEDICINE CLINIC | Facility: CLINIC | Age: 42
End: 2022-02-08

## 2022-02-10 NOTE — TELEPHONE ENCOUNTER
Patient has a history of Tb in past that was treated, order placed for quantiferon,  and cxr done already 12/2021, cxr printed  Given to [facility] to give to patient for [facility]

## 2022-02-12 LAB
MITOGEN-NIL: >10 IU/ML
NIL: 1.06 IU/ML
QUANTIFERON(R)-TB GOLD PLUS, 1 TUBE: POSITIVE
TB1-NIL: 8.43 IU/ML
TB2-NIL: >10 IU/ML

## 2022-02-17 ENCOUNTER — TELEMEDICINE (OUTPATIENT)
Dept: FAMILY MEDICINE CLINIC | Facility: CLINIC | Age: 42
End: 2022-02-17
Payer: MEDICAID

## 2022-02-17 VITALS
BODY MASS INDEX: 17.89 KG/M2 | TEMPERATURE: 96 F | HEART RATE: 95 BPM | SYSTOLIC BLOOD PRESSURE: 96 MMHG | HEIGHT: 63 IN | WEIGHT: 101 LBS | DIASTOLIC BLOOD PRESSURE: 56 MMHG

## 2022-02-17 DIAGNOSIS — R76.12 POSITIVE QUANTIFERON-TB GOLD TEST: Primary | ICD-10-CM

## 2022-02-17 DIAGNOSIS — R07.89 CHEST WALL PAIN: ICD-10-CM

## 2022-02-17 DIAGNOSIS — Z86.11 HISTORY OF PRIMARY TB: ICD-10-CM

## 2022-02-17 PROCEDURE — 99214 OFFICE O/P EST MOD 30 MIN: CPT | Performed by: FAMILY MEDICINE

## 2022-02-24 ENCOUNTER — TELEPHONE (OUTPATIENT)
Dept: FAMILY MEDICINE CLINIC | Facility: CLINIC | Age: 42
End: 2022-02-24

## 2022-02-24 NOTE — TELEPHONE ENCOUNTER
Dr. Ruthann Osorio office states the patient arrived for a TB test, the office needs to know the last chest xray date and results faxed to Fax #[phone]

## 2022-02-25 NOTE — TELEPHONE ENCOUNTER
Received call from Ildefonso Ku with Dr Alycia Fuentes office, confirms fax# is [phone], please re-send fax. Ildefonso Ku also said patient is required to have a chest x-ray from the last 30 days, please advise on order.

## 2022-02-25 NOTE — TELEPHONE ENCOUNTER
Patient following up, reports unable to schedule appt with Dr Aura Hobbs, their office is requesting info be faxed over. Please fax last chest x-ray, patient also asked to send her Quant TB result and referral. To fax number provided below Fax #[phone]. Notify patient once completed so she can schedule appt.

## 2022-02-25 NOTE — TELEPHONE ENCOUNTER
Last chest xray, tb quantiferon result and referral have been faxed as requested    Spoke to patent and she is aware

## 2022-02-28 ENCOUNTER — TELEPHONE (OUTPATIENT)
Dept: FAMILY MEDICINE CLINIC | Facility: CLINIC | Age: 42
End: 2022-02-28

## 2022-02-28 NOTE — TELEPHONE ENCOUNTER
Central scheduling states patient wanted to schedule her CT scan, however insurnce has denied coverage for test. She did inform patient, but does not seem like patient understands why, requesting nurse to call patient to advise.

## 2022-03-02 ENCOUNTER — TELEPHONE (OUTPATIENT)
Dept: FAMILY MEDICINE CLINIC | Facility: CLINIC | Age: 42
End: 2022-03-02

## 2022-03-02 ENCOUNTER — HOSPITAL ENCOUNTER (OUTPATIENT)
Dept: GENERAL RADIOLOGY | Facility: HOSPITAL | Age: 42
Discharge: HOME | End: 2022-03-02
Attending: FAMILY MEDICINE
Payer: MEDICAID

## 2022-03-02 DIAGNOSIS — R76.12 POSITIVE QUANTIFERON-TB GOLD TEST: ICD-10-CM

## 2022-03-02 PROCEDURE — 71046 X-RAY EXAM CHEST 2 VIEWS: CPT | Performed by: FAMILY MEDICINE

## 2022-03-02 NOTE — TELEPHONE ENCOUNTER
Patient and father  calling, identified name and , states went get CXR done at \" a clinic\"    and was told they had no instructions     People on the phone are very confused ,  Went to some place in [facility]     Directed patient to  the [facility] to have X ray done    Has to re-direct the conversation many time    Called was over 7 minutes

## 2022-03-03 ENCOUNTER — TELEPHONE (OUTPATIENT)
Dept: FAMILY MEDICINE CLINIC | Facility: CLINIC | Age: 42
End: 2022-03-03

## 2022-03-03 ENCOUNTER — HOSPITAL ENCOUNTER (OUTPATIENT)
Dept: GENERAL RADIOLOGY | Facility: HOSPITAL | Age: 42
Discharge: HOME | End: 2022-03-03
Attending: FAMILY MEDICINE
Payer: MEDICAID

## 2022-03-03 DIAGNOSIS — R93.89 ABNORMAL CXR: ICD-10-CM

## 2022-03-03 PROCEDURE — 71045 X-RAY EXAM CHEST 1 VIEW: CPT | Performed by: FAMILY MEDICINE

## 2022-03-03 NOTE — TELEPHONE ENCOUNTER
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 Patient will drop off forms at [facility] office today.

## 2022-03-03 NOTE — TELEPHONE ENCOUNTER
Patient had positive QuantiFERON test.  She was seeing infectious disease. She needs to get clearance from ID to return to work.   She will then need to follow-up with me for an exam and bring in the letter from them

## 2022-03-03 NOTE — TELEPHONE ENCOUNTER
Patient drop of form to be completed by provider. Form place in provider bin. Please call when ready for .

## 2022-03-05 NOTE — TELEPHONE ENCOUNTER
Pt informed of message below pt stated she was cleared from infection disease Dr. PEREZ did informed her  she needs to bring a clearance letter with her pt voice understanding.

## 2022-03-07 ENCOUNTER — OFFICE VISIT (OUTPATIENT)
Dept: FAMILY MEDICINE CLINIC | Facility: CLINIC | Age: 42
End: 2022-03-07
Payer: MEDICAID

## 2022-03-07 VITALS
TEMPERATURE: 98 F | HEART RATE: 85 BPM | BODY MASS INDEX: 18.43 KG/M2 | HEIGHT: 63 IN | DIASTOLIC BLOOD PRESSURE: 64 MMHG | OXYGEN SATURATION: 98 % | SYSTOLIC BLOOD PRESSURE: 93 MMHG | WEIGHT: 104 LBS

## 2022-03-07 DIAGNOSIS — E55.9 VITAMIN D DEFICIENCY: ICD-10-CM

## 2022-03-07 DIAGNOSIS — Z02.89 ENCOUNTER FOR PHYSICAL EXAMINATION RELATED TO EMPLOYMENT: ICD-10-CM

## 2022-03-07 DIAGNOSIS — B00.1 HERPES SIMPLEX LABIALIS: ICD-10-CM

## 2022-03-07 DIAGNOSIS — Z00.00 WELL ADULT EXAM: Primary | ICD-10-CM

## 2022-03-07 DIAGNOSIS — J45.20 MILD INTERMITTENT REACTIVE AIRWAY DISEASE WITHOUT COMPLICATION: ICD-10-CM

## 2022-03-07 DIAGNOSIS — R76.12 POSITIVE QUANTIFERON-TB GOLD TEST: ICD-10-CM

## 2022-03-07 DIAGNOSIS — R94.6 ABNORMAL THYROID FUNCTION TEST: ICD-10-CM

## 2022-03-07 DIAGNOSIS — Z86.11 HISTORY OF PRIMARY TB: ICD-10-CM

## 2022-03-07 PROBLEM — F41.9 ANXIETY: Status: RESOLVED | Noted: 2021-12-17 | Resolved: 2022-03-07

## 2022-03-07 PROBLEM — R79.89 LOW TSH LEVEL: Status: RESOLVED | Noted: 2017-06-11 | Resolved: 2022-03-07

## 2022-03-07 PROCEDURE — 3008F BODY MASS INDEX DOCD: CPT | Performed by: FAMILY MEDICINE

## 2022-03-07 PROCEDURE — 99395 PREV VISIT EST AGE 18-39: CPT | Performed by: FAMILY MEDICINE

## 2022-03-07 PROCEDURE — 3078F DIAST BP <80 MM HG: CPT | Performed by: FAMILY MEDICINE

## 2022-03-07 PROCEDURE — 3074F SYST BP LT 130 MM HG: CPT | Performed by: FAMILY MEDICINE

## 2022-06-04 ENCOUNTER — HOSPITAL ENCOUNTER (EMERGENCY)
Facility: HOSPITAL | Age: 42
Discharge: HOME | End: 2022-06-04
Attending: EMERGENCY MEDICINE
Payer: MEDICAID

## 2022-06-04 ENCOUNTER — NURSE TRIAGE (OUTPATIENT)
Dept: FAMILY MEDICINE CLINIC | Facility: CLINIC | Age: 42
End: 2022-06-04

## 2022-06-04 VITALS
SYSTOLIC BLOOD PRESSURE: 96 MMHG | DIASTOLIC BLOOD PRESSURE: 60 MMHG | WEIGHT: 115 LBS | OXYGEN SATURATION: 100 % | HEART RATE: 68 BPM | HEIGHT: 63 IN | BODY MASS INDEX: 20.37 KG/M2 | TEMPERATURE: 98 F | RESPIRATION RATE: 16 BRPM

## 2022-06-04 DIAGNOSIS — S63.611A SPRAIN OF LEFT INDEX FINGER, UNSPECIFIED SITE OF DIGIT, INITIAL ENCOUNTER: Primary | ICD-10-CM

## 2022-06-04 DIAGNOSIS — R42 DIZZINESS: ICD-10-CM

## 2022-06-04 PROCEDURE — 99283 EMERGENCY DEPT VISIT LOW MDM: CPT

## 2022-06-04 RX ORDER — ACETAMINOPHEN 500 MG
1000 TABLET ORAL ONCE
Status: COMPLETED | OUTPATIENT
Start: 2022-06-04 | End: 2022-06-04

## 2022-06-04 RX ORDER — MECLIZINE HYDROCHLORIDE 25 MG/1
25 TABLET ORAL 4 TIMES DAILY
Qty: 20 TABLET | Refills: 0 | Status: SHIPPED | OUTPATIENT
Start: 2022-06-04 | End: 2022-06-24

## 2022-06-04 RX ORDER — DIAZEPAM 5 MG/1
5 TABLET ORAL ONCE
Status: COMPLETED | OUTPATIENT
Start: 2022-06-04 | End: 2022-06-04

## 2022-06-04 NOTE — ED INITIAL ASSESSMENT (HPI)
Patient to ER from home with her father with c/o dizziness X1 week and fall last night and today. Denies head injury. Patient a&OX4.

## 2022-06-08 ENCOUNTER — LAB ENCOUNTER (OUTPATIENT)
Dept: LAB | Age: 42
End: 2022-06-08
Attending: FAMILY MEDICINE
Payer: MEDICAID

## 2022-06-08 ENCOUNTER — HOSPITAL ENCOUNTER (OUTPATIENT)
Dept: GENERAL RADIOLOGY | Facility: HOSPITAL | Age: 42
Discharge: HOME | End: 2022-06-08
Attending: FAMILY MEDICINE
Payer: MEDICAID

## 2022-06-08 ENCOUNTER — OFFICE VISIT (OUTPATIENT)
Dept: FAMILY MEDICINE CLINIC | Facility: CLINIC | Age: 42
End: 2022-06-08
Payer: MEDICAID

## 2022-06-08 VITALS
WEIGHT: 101 LBS | OXYGEN SATURATION: 97 % | DIASTOLIC BLOOD PRESSURE: 59 MMHG | HEIGHT: 63 IN | HEART RATE: 96 BPM | BODY MASS INDEX: 17.89 KG/M2 | TEMPERATURE: 97 F | SYSTOLIC BLOOD PRESSURE: 90 MMHG

## 2022-06-08 DIAGNOSIS — R45.89 DEPRESSED MOOD: ICD-10-CM

## 2022-06-08 DIAGNOSIS — R10.10 UPPER ABDOMINAL PAIN: ICD-10-CM

## 2022-06-08 DIAGNOSIS — M79.645 FINGER PAIN, LEFT: ICD-10-CM

## 2022-06-08 DIAGNOSIS — R42 DIZZINESS: Primary | ICD-10-CM

## 2022-06-08 DIAGNOSIS — R53.83 FATIGUE, UNSPECIFIED TYPE: ICD-10-CM

## 2022-06-08 DIAGNOSIS — R05.9 COUGH: ICD-10-CM

## 2022-06-08 DIAGNOSIS — F43.9 STRESS: ICD-10-CM

## 2022-06-08 DIAGNOSIS — R63.0 ANOREXIA: ICD-10-CM

## 2022-06-08 PROBLEM — D70.9 NEUTROPENIA, UNSPECIFIED (HCC): Status: ACTIVE | Noted: 2022-06-08

## 2022-06-08 PROBLEM — D70.9 NEUTROPENIA, UNSPECIFIED: Status: ACTIVE | Noted: 2022-06-08

## 2022-06-08 LAB
ALBUMIN SERPL-MCNC: 3.8 G/DL (ref 3.4–5)
ALBUMIN/GLOB SERPL: 0.9 {RATIO} (ref 1–2)
ALP LIVER SERPL-CCNC: 62 U/L
ALT SERPL-CCNC: 18 U/L
ANION GAP SERPL CALC-SCNC: 5 MMOL/L (ref 0–18)
AST SERPL-CCNC: 17 U/L (ref 15–37)
BASOPHILS # BLD AUTO: 0.05 X10(3) UL (ref 0–0.2)
BASOPHILS NFR BLD AUTO: 0.8 %
BILIRUB SERPL-MCNC: 0.3 MG/DL (ref 0.1–2)
BUN BLD-MCNC: 11 MG/DL (ref 7–18)
BUN/CREAT SERPL: 16.2 (ref 10–20)
CALCIUM BLD-MCNC: 9.5 MG/DL (ref 8.5–10.1)
CHLORIDE SERPL-SCNC: 104 MMOL/L (ref 98–112)
CO2 SERPL-SCNC: 29 MMOL/L (ref 21–32)
CREAT BLD-MCNC: 0.68 MG/DL
DEPRECATED RDW RBC AUTO: 44.3 FL (ref 35.1–46.3)
EOSINOPHIL # BLD AUTO: 0.15 X10(3) UL (ref 0–0.7)
EOSINOPHIL NFR BLD AUTO: 2.3 %
ERYTHROCYTE [DISTWIDTH] IN BLOOD BY AUTOMATED COUNT: 13.2 % (ref 11–15)
FASTING STATUS PATIENT QL REPORTED: NO
GLOBULIN PLAS-MCNC: 4.3 G/DL (ref 2.8–4.4)
GLUCOSE BLD-MCNC: 89 MG/DL (ref 70–99)
HCG SERPL QL: NEGATIVE
HCT VFR BLD AUTO: 39 %
HGB BLD-MCNC: 12.5 G/DL
IMM GRANULOCYTES # BLD AUTO: 0.01 X10(3) UL (ref 0–1)
IMM GRANULOCYTES NFR BLD: 0.2 %
LIPASE SERPL-CCNC: 160 U/L (ref 73–393)
LYMPHOCYTES # BLD AUTO: 1.48 X10(3) UL (ref 1–4)
LYMPHOCYTES NFR BLD AUTO: 23.1 %
MCH RBC QN AUTO: 29.3 PG (ref 26–34)
MCHC RBC AUTO-ENTMCNC: 32.1 G/DL (ref 31–37)
MCV RBC AUTO: 91.3 FL
MONOCYTES # BLD AUTO: 0.45 X10(3) UL (ref 0.1–1)
MONOCYTES NFR BLD AUTO: 7 %
NEUTROPHILS # BLD AUTO: 4.26 X10 (3) UL (ref 1.5–7.7)
NEUTROPHILS # BLD AUTO: 4.26 X10(3) UL (ref 1.5–7.7)
NEUTROPHILS NFR BLD AUTO: 66.6 %
OSMOLALITY SERPL CALC.SUM OF ELEC: 285 MOSM/KG (ref 275–295)
PLATELET # BLD AUTO: 408 10(3)UL (ref 150–450)
POTASSIUM SERPL-SCNC: 3.6 MMOL/L (ref 3.5–5.1)
PROT SERPL-MCNC: 8.1 G/DL (ref 6.4–8.2)
RBC # BLD AUTO: 4.27 X10(6)UL
SODIUM SERPL-SCNC: 138 MMOL/L (ref 136–145)
WBC # BLD AUTO: 6.4 X10(3) UL (ref 4–11)

## 2022-06-08 PROCEDURE — 84703 CHORIONIC GONADOTROPIN ASSAY: CPT | Performed by: FAMILY MEDICINE

## 2022-06-08 PROCEDURE — 3008F BODY MASS INDEX DOCD: CPT | Performed by: FAMILY MEDICINE

## 2022-06-08 PROCEDURE — 85025 COMPLETE CBC W/AUTO DIFF WBC: CPT | Performed by: FAMILY MEDICINE

## 2022-06-08 PROCEDURE — 83690 ASSAY OF LIPASE: CPT | Performed by: FAMILY MEDICINE

## 2022-06-08 PROCEDURE — 99214 OFFICE O/P EST MOD 30 MIN: CPT | Performed by: FAMILY MEDICINE

## 2022-06-08 PROCEDURE — 3078F DIAST BP <80 MM HG: CPT | Performed by: FAMILY MEDICINE

## 2022-06-08 PROCEDURE — 80053 COMPREHEN METABOLIC PANEL: CPT | Performed by: FAMILY MEDICINE

## 2022-06-08 PROCEDURE — 36415 COLL VENOUS BLD VENIPUNCTURE: CPT | Performed by: FAMILY MEDICINE

## 2022-06-08 PROCEDURE — 73140 X-RAY EXAM OF FINGER(S): CPT | Performed by: FAMILY MEDICINE

## 2022-06-08 PROCEDURE — 3074F SYST BP LT 130 MM HG: CPT | Performed by: FAMILY MEDICINE

## 2022-06-08 RX ORDER — SERTRALINE HYDROCHLORIDE 25 MG/1
25 TABLET, FILM COATED ORAL DAILY
Qty: 30 TABLET | Refills: 1 | Status: SHIPPED | OUTPATIENT
Start: 2022-06-08

## 2022-06-08 RX ORDER — OMEPRAZOLE 20 MG/1
20 CAPSULE, DELAYED RELEASE ORAL
Qty: 30 CAPSULE | Refills: 0 | Status: SHIPPED | OUTPATIENT
Start: 2022-06-08

## 2022-06-08 RX ORDER — SERTRALINE HYDROCHLORIDE 100 MG/1
50 TABLET, FILM COATED ORAL DAILY
Qty: 30 TABLET | Refills: 0 | Status: SHIPPED | OUTPATIENT
Start: 2022-06-08 | End: 2022-06-08 | Stop reason: CLARIF

## 2022-06-22 ENCOUNTER — LAB ENCOUNTER (OUTPATIENT)
Dept: LAB | Age: 42
End: 2022-06-22
Attending: FAMILY MEDICINE
Payer: MEDICAID

## 2022-06-22 ENCOUNTER — OFFICE VISIT (OUTPATIENT)
Dept: FAMILY MEDICINE CLINIC | Facility: CLINIC | Age: 42
End: 2022-06-22
Payer: MEDICAID

## 2022-06-22 VITALS
BODY MASS INDEX: 18.07 KG/M2 | HEART RATE: 76 BPM | HEIGHT: 63 IN | SYSTOLIC BLOOD PRESSURE: 86 MMHG | OXYGEN SATURATION: 97 % | DIASTOLIC BLOOD PRESSURE: 54 MMHG | WEIGHT: 102 LBS

## 2022-06-22 DIAGNOSIS — R42 DIZZINESS: Primary | ICD-10-CM

## 2022-06-22 DIAGNOSIS — R42 DIZZINESS: ICD-10-CM

## 2022-06-22 DIAGNOSIS — M79.645 FINGER PAIN, LEFT: ICD-10-CM

## 2022-06-22 PROCEDURE — 3008F BODY MASS INDEX DOCD: CPT | Performed by: FAMILY MEDICINE

## 2022-06-22 PROCEDURE — 3074F SYST BP LT 130 MM HG: CPT | Performed by: FAMILY MEDICINE

## 2022-06-22 PROCEDURE — 93005 ELECTROCARDIOGRAM TRACING: CPT

## 2022-06-22 PROCEDURE — 93010 ELECTROCARDIOGRAM REPORT: CPT | Performed by: FAMILY MEDICINE

## 2022-06-22 PROCEDURE — 3078F DIAST BP <80 MM HG: CPT | Performed by: FAMILY MEDICINE

## 2022-06-22 PROCEDURE — 99214 OFFICE O/P EST MOD 30 MIN: CPT | Performed by: FAMILY MEDICINE

## 2022-06-29 ENCOUNTER — HOSPITAL ENCOUNTER (OUTPATIENT)
Dept: CV DIAGNOSTICS | Facility: HOSPITAL | Age: 42
Discharge: HOME | End: 2022-06-29
Attending: FAMILY MEDICINE
Payer: MEDICAID

## 2022-06-29 DIAGNOSIS — R42 DIZZINESS: ICD-10-CM

## 2022-06-29 PROCEDURE — 93225 XTRNL ECG REC<48 HRS REC: CPT | Performed by: FAMILY MEDICINE

## 2022-06-29 PROCEDURE — 93227 XTRNL ECG REC<48 HR R&I: CPT | Performed by: FAMILY MEDICINE

## 2022-07-01 DIAGNOSIS — J45.20 MILD INTERMITTENT REACTIVE AIRWAY DISEASE WITHOUT COMPLICATION: ICD-10-CM

## 2022-07-03 RX ORDER — ALBUTEROL SULFATE 90 UG/1
AEROSOL, METERED RESPIRATORY (INHALATION)
Qty: 8.5 G | Refills: 0 | Status: SHIPPED | OUTPATIENT
Start: 2022-07-03

## 2022-07-14 DIAGNOSIS — R10.10 UPPER ABDOMINAL PAIN: ICD-10-CM

## 2022-07-16 RX ORDER — OMEPRAZOLE 20 MG/1
CAPSULE, DELAYED RELEASE ORAL
Qty: 30 CAPSULE | Refills: 0 | Status: SHIPPED | OUTPATIENT
Start: 2022-07-16

## 2022-08-03 ENCOUNTER — NURSE TRIAGE (OUTPATIENT)
Dept: FAMILY MEDICINE CLINIC | Facility: CLINIC | Age: 42
End: 2022-08-03

## 2022-08-12 DIAGNOSIS — R45.89 DEPRESSED MOOD: ICD-10-CM

## 2022-08-12 DIAGNOSIS — R10.10 UPPER ABDOMINAL PAIN: ICD-10-CM

## 2022-08-14 RX ORDER — OMEPRAZOLE 20 MG/1
CAPSULE, DELAYED RELEASE ORAL
Qty: 30 CAPSULE | Refills: 0 | Status: SHIPPED | OUTPATIENT
Start: 2022-08-14

## 2022-08-14 RX ORDER — SERTRALINE HYDROCHLORIDE 25 MG/1
TABLET, FILM COATED ORAL
Qty: 30 TABLET | Refills: 1 | Status: SHIPPED | OUTPATIENT
Start: 2022-08-14 | End: 2022-08-15

## 2022-08-15 ENCOUNTER — LAB ENCOUNTER (OUTPATIENT)
Dept: LAB | Age: 42
End: 2022-08-15
Attending: FAMILY MEDICINE
Payer: MEDICAID

## 2022-08-15 DIAGNOSIS — M25.50 POLYARTHRALGIA: ICD-10-CM

## 2022-08-15 LAB
ALBUMIN SERPL-MCNC: 4.1 G/DL (ref 3.4–5)
ALBUMIN/GLOB SERPL: 0.9 {RATIO} (ref 1–2)
ALP LIVER SERPL-CCNC: 64 U/L
ALT SERPL-CCNC: 19 U/L
ANION GAP SERPL CALC-SCNC: 6 MMOL/L (ref 0–18)
AST SERPL-CCNC: 19 U/L (ref 15–37)
BASOPHILS # BLD AUTO: 0.07 X10(3) UL (ref 0–0.2)
BASOPHILS NFR BLD AUTO: 1.1 %
BILIRUB SERPL-MCNC: 0.5 MG/DL (ref 0.1–2)
BUN BLD-MCNC: 12 MG/DL (ref 7–18)
BUN/CREAT SERPL: 16.2 (ref 10–20)
CALCIUM BLD-MCNC: 9.6 MG/DL (ref 8.5–10.1)
CHLORIDE SERPL-SCNC: 107 MMOL/L (ref 98–112)
CO2 SERPL-SCNC: 26 MMOL/L (ref 21–32)
CREAT BLD-MCNC: 0.74 MG/DL
CRP SERPL-MCNC: <0.29 MG/DL (ref ?–0.3)
DEPRECATED RDW RBC AUTO: 45.7 FL (ref 35.1–46.3)
EOSINOPHIL # BLD AUTO: 0.08 X10(3) UL (ref 0–0.7)
EOSINOPHIL NFR BLD AUTO: 1.3 %
ERYTHROCYTE [DISTWIDTH] IN BLOOD BY AUTOMATED COUNT: 13.6 % (ref 11–15)
ERYTHROCYTE [SEDIMENTATION RATE] IN BLOOD: 21 MM/HR
FASTING STATUS PATIENT QL REPORTED: NO
GFR SERPLBLD BASED ON 1.73 SQ M-ARVRAT: 105 ML/MIN/1.73M2 (ref 60–?)
GLOBULIN PLAS-MCNC: 4.5 G/DL (ref 2.8–4.4)
GLUCOSE BLD-MCNC: 92 MG/DL (ref 70–99)
HCT VFR BLD AUTO: 40.3 %
HGB BLD-MCNC: 13.1 G/DL
IMM GRANULOCYTES # BLD AUTO: 0.01 X10(3) UL (ref 0–1)
IMM GRANULOCYTES NFR BLD: 0.2 %
LYMPHOCYTES # BLD AUTO: 1.68 X10(3) UL (ref 1–4)
LYMPHOCYTES NFR BLD AUTO: 27 %
MCH RBC QN AUTO: 29.6 PG (ref 26–34)
MCHC RBC AUTO-ENTMCNC: 32.5 G/DL (ref 31–37)
MCV RBC AUTO: 91.2 FL
MONOCYTES # BLD AUTO: 0.36 X10(3) UL (ref 0.1–1)
MONOCYTES NFR BLD AUTO: 5.8 %
NEUTROPHILS # BLD AUTO: 4.02 X10 (3) UL (ref 1.5–7.7)
NEUTROPHILS # BLD AUTO: 4.02 X10(3) UL (ref 1.5–7.7)
NEUTROPHILS NFR BLD AUTO: 64.6 %
OSMOLALITY SERPL CALC.SUM OF ELEC: 287 MOSM/KG (ref 275–295)
PLATELET # BLD AUTO: 361 10(3)UL (ref 150–450)
POTASSIUM SERPL-SCNC: 3.5 MMOL/L (ref 3.5–5.1)
PROT SERPL-MCNC: 8.6 G/DL (ref 6.4–8.2)
RBC # BLD AUTO: 4.42 X10(6)UL
SODIUM SERPL-SCNC: 139 MMOL/L (ref 136–145)
WBC # BLD AUTO: 6.2 X10(3) UL (ref 4–11)

## 2022-08-15 PROCEDURE — 36415 COLL VENOUS BLD VENIPUNCTURE: CPT

## 2022-08-15 PROCEDURE — 86140 C-REACTIVE PROTEIN: CPT

## 2022-08-15 PROCEDURE — 85652 RBC SED RATE AUTOMATED: CPT

## 2022-08-15 PROCEDURE — 85025 COMPLETE CBC W/AUTO DIFF WBC: CPT

## 2022-08-15 PROCEDURE — 80053 COMPREHEN METABOLIC PANEL: CPT

## 2022-08-17 DIAGNOSIS — M25.50 POLYARTHRALGIA: Primary | ICD-10-CM

## 2022-08-23 ENCOUNTER — OFFICE VISIT (OUTPATIENT)
Dept: PULMONOLOGY | Facility: CLINIC | Age: 42
End: 2022-08-23
Payer: MEDICAID

## 2022-08-23 VITALS
OXYGEN SATURATION: 98 % | HEART RATE: 76 BPM | SYSTOLIC BLOOD PRESSURE: 93 MMHG | BODY MASS INDEX: 17.96 KG/M2 | HEIGHT: 63 IN | WEIGHT: 101.38 LBS | DIASTOLIC BLOOD PRESSURE: 63 MMHG

## 2022-08-23 DIAGNOSIS — R91.1 LUNG NODULE: Primary | ICD-10-CM

## 2022-08-23 DIAGNOSIS — R06.00 DYSPNEA, UNSPECIFIED TYPE: ICD-10-CM

## 2022-08-23 PROCEDURE — 3008F BODY MASS INDEX DOCD: CPT | Performed by: INTERNAL MEDICINE

## 2022-08-23 PROCEDURE — 3074F SYST BP LT 130 MM HG: CPT | Performed by: INTERNAL MEDICINE

## 2022-08-23 PROCEDURE — 99244 OFF/OP CNSLTJ NEW/EST MOD 40: CPT | Performed by: INTERNAL MEDICINE

## 2022-08-23 PROCEDURE — 3078F DIAST BP <80 MM HG: CPT | Performed by: INTERNAL MEDICINE

## 2022-08-29 ENCOUNTER — LAB ENCOUNTER (OUTPATIENT)
Dept: LAB | Facility: HOSPITAL | Age: 42
End: 2022-08-29
Attending: INTERNAL MEDICINE
Payer: MEDICAID

## 2022-08-29 DIAGNOSIS — R06.00 DYSPNEA, UNSPECIFIED TYPE: ICD-10-CM

## 2022-08-29 LAB — SARS-COV-2 RNA RESP QL NAA+PROBE: NOT DETECTED

## 2022-08-31 ENCOUNTER — HOSPITAL ENCOUNTER (OUTPATIENT)
Dept: CT IMAGING | Facility: HOSPITAL | Age: 42
Discharge: HOME | End: 2022-08-31
Attending: INTERNAL MEDICINE
Payer: MEDICAID

## 2022-08-31 DIAGNOSIS — R91.1 LUNG NODULE: ICD-10-CM

## 2022-08-31 PROCEDURE — 71250 CT THORAX DX C-: CPT | Performed by: INTERNAL MEDICINE

## 2022-09-01 ENCOUNTER — HOSPITAL ENCOUNTER (OUTPATIENT)
Dept: RESPIRATORY THERAPY | Facility: HOSPITAL | Age: 42
End: 2022-09-01
Attending: INTERNAL MEDICINE
Payer: MEDICAID

## 2022-09-06 ENCOUNTER — LAB ENCOUNTER (OUTPATIENT)
Dept: LAB | Facility: HOSPITAL | Age: 42
End: 2022-09-06
Attending: INTERNAL MEDICINE
Payer: MEDICAID

## 2022-09-06 DIAGNOSIS — J45.20 MILD INTERMITTENT REACTIVE AIRWAY DISEASE WITHOUT COMPLICATION: ICD-10-CM

## 2022-09-06 DIAGNOSIS — Z11.59 SCREENING FOR VIRAL DISEASE: ICD-10-CM

## 2022-09-06 DIAGNOSIS — Z01.818 PREOPERATIVE EXAMINATION, UNSPECIFIED: ICD-10-CM

## 2022-09-06 LAB — SARS-COV-2 RNA RESP QL NAA+PROBE: NOT DETECTED

## 2022-09-06 RX ORDER — ALBUTEROL SULFATE 90 UG/1
2 AEROSOL, METERED RESPIRATORY (INHALATION) EVERY 6 HOURS PRN
Qty: 8.5 G | Refills: 1 | Status: SHIPPED | OUTPATIENT
Start: 2022-09-06 | End: 2023-11-30

## 2022-09-07 NOTE — TELEPHONE ENCOUNTER
Refill passed per [place] [employer], [facility] protocol.      Requested Prescriptions   Pending Prescriptions Disp Refills    ALBUTEROL 108 (90 Base) MCG/ACT Inhalation Aero Soln [Pharmacy Med Name: ALBUTEROL HFA INH (200 PUFFS)8.5GM] 8.5 g 0     Sig: INHALE 2 PUFFS BY MOUTH EVERY 6 HOURS AS NEEDED FOR WHEEZING OR SHORTNESS OF BREATH        Asthma & COPD Medication Protocol Passed - 9/6/2022 11:49 AM        Passed - In person appointment or virtual visit in the past 6 mos or appointment in next 3 mos       Recent Outpatient Visits              2 weeks ago Lung nodule    [facility], [address] [place] [address]    Office Visit    3 weeks ago Depressed mood    Aleksandra Zamora MD    Office Visit    2 months ago Eddie Castillo MD    Office Visit    3 months ago Eddie Castillo MD    Office Visit    6 months ago Well adult exam    Aleksandra Zamora MD    Office Visit     Future Appointments         Provider Department Appt Notes    In 3 days [address] Respiratory Therapy *If patient comes in, pt will have to reschedule* QA:CONSENT                      Future Appointments         Provider Department Appt Notes    In 3 days [address] Respiratory Therapy *If patient comes in, pt will have to reschedule* QA:CONSENT             Recent Outpatient Visits              2 weeks ago Lung nodule    [facility], [address] [place] [address], [place], [place]    Office Visit    3 weeks ago Depressed mood    Aleksandra Zamora MD    Office Visit    2 months ago Eddie Castillo MD    Office Visit    3 months ago [address] [place] [address] Dr NEVILLE Katy Hatch MD    Office Visit    6 months ago Well adult exam    Ephraim Mcgarry MD    Office Visit

## 2022-09-09 ENCOUNTER — HOSPITAL ENCOUNTER (OUTPATIENT)
Dept: RESPIRATORY THERAPY | Facility: HOSPITAL | Age: 42
Discharge: HOME | End: 2022-09-09
Attending: INTERNAL MEDICINE
Payer: MEDICAID

## 2022-09-09 DIAGNOSIS — R06.00 DYSPNEA, UNSPECIFIED TYPE: ICD-10-CM

## 2022-09-09 PROCEDURE — 94729 DIFFUSING CAPACITY: CPT

## 2022-09-09 PROCEDURE — 94060 EVALUATION OF WHEEZING: CPT

## 2022-09-09 PROCEDURE — 94726 PLETHYSMOGRAPHY LUNG VOLUMES: CPT

## 2022-09-19 DIAGNOSIS — R10.10 UPPER ABDOMINAL PAIN: ICD-10-CM

## 2022-09-20 DIAGNOSIS — R10.10 UPPER ABDOMINAL PAIN: ICD-10-CM

## 2022-09-20 DIAGNOSIS — R45.89 DEPRESSED MOOD: ICD-10-CM

## 2022-09-20 RX ORDER — OMEPRAZOLE 20 MG/1
20 CAPSULE, DELAYED RELEASE ORAL
Qty: 90 CAPSULE | Refills: 1 | Status: SHIPPED | OUTPATIENT
Start: 2022-09-20

## 2022-09-20 RX ORDER — VENLAFAXINE HYDROCHLORIDE 37.5 MG/1
CAPSULE, EXTENDED RELEASE ORAL
Qty: 30 CAPSULE | Refills: 0 | Status: SHIPPED | OUTPATIENT
Start: 2022-09-20

## 2022-09-20 RX ORDER — OMEPRAZOLE 20 MG/1
CAPSULE, DELAYED RELEASE ORAL
Qty: 30 CAPSULE | Refills: 0 | OUTPATIENT
Start: 2022-09-20

## 2022-09-20 NOTE — TELEPHONE ENCOUNTER
Please review. Protocol passed, but it looks like only has been getting 30 tablets at a time. Ok to send  90, with 1 refill?     Requested Prescriptions   Pending Prescriptions Disp Refills    OMEPRAZOLE 20 MG Oral Capsule Delayed Release [Pharmacy Med Name: OMEPRAZOLE 20MG CAPSULES] 30 capsule 0     Sig: TAKE 1 CAPSULE(20 MG) BY MOUTH EVERY MORNING BEFORE BREAKFAST        Gastrointestional Medication Protocol Passed - 9/19/2022  7:54 PM        Passed - In person appointment or virtual visit in the past 12 mos or appointment in next 3 mos       Recent Outpatient Visits              4 weeks ago Lung nodule    [facility] [address] [place] [address], [place], [place]    Office Visit    1 month ago Depressed mood    Melita Trotter MD    Office Visit    3 months ago Artur Camejo MD    Office Visit    3 months ago Artur Camejo MD    Office Visit    6 months ago Well adult exam    Melita Trotter MD    Office Visit                       Recent Outpatient Visits              4 weeks ago Lung nodule    [facility] [address] [place] [address], [place], [place]    Office Visit    1 month ago Depressed mood    Melita Trotter MD    Office Visit    3 months ago Artur Camejo MD    Office Visit    3 months ago Atrur Camejo MD    Office Visit    6 months ago Well adult exam    Melita Trotter MD    Office Visit

## 2022-09-20 NOTE — TELEPHONE ENCOUNTER
Refill passed per [facility] protocol. Venlafaxine HCL sent but only for 30 tabs as last office visit note advised a 3 week follow up.

## 2022-09-26 ENCOUNTER — TELEPHONE (OUTPATIENT)
Dept: PULMONOLOGY | Facility: CLINIC | Age: 42
End: 2022-09-26

## 2022-09-26 DIAGNOSIS — J98.4 RESTRICTIVE LUNG DISEASE: Primary | ICD-10-CM

## 2022-09-26 NOTE — TELEPHONE ENCOUNTER
Spoke with patient. Patient requesting PFT results and next steps. Patient aware Dr. Polanco is not in the office today.

## 2022-09-26 NOTE — TELEPHONE ENCOUNTER
Patient is calling to results from testing that was done at the beginning of the month.  Please call

## 2022-09-28 NOTE — TELEPHONE ENCOUNTER
Spoke with patient. Informed patient of Dr. Saavedra Splinter message/orders below. Patient verbalized understanding, states she will get blood work done tomorrow. Appointment with Dr. Betsey Robertson scheduled on 10/18/22 at 10:30AM. Verified date, time, place, and where to park. Patient verbalized understanding.

## 2022-09-28 NOTE — TELEPHONE ENCOUNTER
You may let the patient know that I reviewed her PFT and CT chest results. Her CT chest did not reveal any significant signs of existing lung disease. She has been changed appearance of nodules in her lungs from December 2021. Her pulmonary function testing does reveal evidence of some restrictive lung disease but her CT chest does not reveal any evidence of pulmonary fibrosis. I have ordered some blood testing for rheumatological work-up to see if any of those are positive. Once those blood tests are done I would advise that she schedule follow-up appointment with me sometime next month to discuss further management options. Donald Hannon

## 2022-10-06 ENCOUNTER — LAB ENCOUNTER (OUTPATIENT)
Dept: LAB | Facility: HOSPITAL | Age: 42
End: 2022-10-06
Attending: INTERNAL MEDICINE
Payer: MEDICAID

## 2022-10-06 DIAGNOSIS — J98.4 RESTRICTIVE LUNG DISEASE: ICD-10-CM

## 2022-10-06 LAB — RHEUMATOID FACT SERPL-ACNC: <10 IU/ML (ref ?–15)

## 2022-10-06 PROCEDURE — 86225 DNA ANTIBODY NATIVE: CPT

## 2022-10-06 PROCEDURE — 86200 CCP ANTIBODY: CPT

## 2022-10-06 PROCEDURE — 36415 COLL VENOUS BLD VENIPUNCTURE: CPT

## 2022-10-06 PROCEDURE — 86431 RHEUMATOID FACTOR QUANT: CPT

## 2022-10-06 PROCEDURE — 86235 NUCLEAR ANTIGEN ANTIBODY: CPT

## 2022-10-06 PROCEDURE — 86038 ANTINUCLEAR ANTIBODIES: CPT

## 2022-10-07 LAB
CCP IGG SERPL-ACNC: 1.1 U/ML (ref 0–6.9)
DSDNA IGG SERPL IA-ACNC: 8.5 IU/ML
ENA AB SER QL IA: 0.1 UG/L
ENA AB SER QL IA: NEGATIVE
ENA SCL70 IGG SER IA-ACNC: 0.8 U/ML
ENA SS-A IGG SER IA-ACNC: 0.5 U/ML
ENA SS-B IGG SER IA-ACNC: 0.5 U/ML

## 2022-10-18 ENCOUNTER — OFFICE VISIT (OUTPATIENT)
Dept: PULMONOLOGY | Facility: CLINIC | Age: 42
End: 2022-10-18
Payer: MEDICAID

## 2022-10-18 VITALS
WEIGHT: 105 LBS | DIASTOLIC BLOOD PRESSURE: 67 MMHG | SYSTOLIC BLOOD PRESSURE: 100 MMHG | HEART RATE: 87 BPM | OXYGEN SATURATION: 97 % | RESPIRATION RATE: 20 BRPM | BODY MASS INDEX: 19 KG/M2

## 2022-10-18 DIAGNOSIS — R91.1 LUNG NODULE: ICD-10-CM

## 2022-10-18 DIAGNOSIS — R42 DIZZINESS: Primary | ICD-10-CM

## 2022-10-18 PROCEDURE — 3078F DIAST BP <80 MM HG: CPT | Performed by: INTERNAL MEDICINE

## 2022-10-18 PROCEDURE — 3074F SYST BP LT 130 MM HG: CPT | Performed by: INTERNAL MEDICINE

## 2022-10-18 PROCEDURE — 99213 OFFICE O/P EST LOW 20 MIN: CPT | Performed by: INTERNAL MEDICINE

## 2022-10-18 RX ORDER — FLUTICASONE PROPIONATE AND SALMETEROL 113; 14 UG/1; UG/1
1 POWDER, METERED RESPIRATORY (INHALATION) 2 TIMES DAILY
Qty: 1 EACH | Refills: 5 | Status: SHIPPED | OUTPATIENT
Start: 2022-10-18

## 2022-10-18 NOTE — PROGRESS NOTES
Referring Physician  Rhonda Pennington. Mary Scott MD    History of Present Illness  Patient seen today for follow-up visit to pulmonary clinic. Some ongoing mild dyspnea with exertion. Using albuterol MDI on as-needed basis with mild relief. Denies significant cough or wheezing. Admits to frequent episodes of dizziness. Not sure if stress is contributing to her dizziness episodes. Had Holter monitor work-up which was negative. Medications  omeprazole 20 MG Oral Capsule Delayed Release, Take 1 capsule (20 mg total) by mouth before breakfast., Disp: 90 capsule, Rfl: 1  VENLAFAXINE ER 37.5 MG Oral Capsule SR 24 Hr, TAKE 1 CAPSULE(37.5 MG) BY MOUTH DAILY, Disp: 30 capsule, Rfl: 0  albuterol 108 (90 Base) MCG/ACT Inhalation Aero Soln, Inhale 2 puffs into the lungs every 6 (six) hours as needed for Wheezing or Shortness of Breath., Disp: 8.5 g, Rfl: 1    No current facility-administered medications on file prior to visit. Allergies    Ibuprofen               OTHER (SEE COMMENTS)    Comment:Stomach discomfort    Physical Exam  Constitutional: no acute distress  HEENT: PERRL  Neck: supple, no JVD  Cardio: RRR, S1 S2  Respiratory: clear to auscultation bilaterally, no wheezing, rales, rhonchi, crackles  GI: abdomen soft, non tender, active bowel sounds, no organomegaly  Extremities: no clubbing, cyanosis, edema  Neurologic: no gross motor deficits  Skin: warm, dry  Lymphatic: no supraclavicular lymphadenopathy     Assessment  1. Restrictive lung disease  2. Left lung nodule  3. Prior history of latent tuberculosis  4. Dizziness    Plan  -Patient presents today for follow-up visit for underlying dyspnea. Reviewed her recent pulmonary function test results with evidence of moderate restrictive lung disease seen. No significant obstructive disease seen. CT chest from 8/31/2022 revealed no significant signs of interstitial lung disease.   -Given ongoing dyspnea and mild improvement with albuterol MDI we will trial patient on ICS/LABA and monitor.  -Most recent CT chest from 8/31/2022 unchanged in size and appearance from 12/16/2021. Recommend follow-up CT chest in 1 year duration.       Gumaro Butt DO  Pulmonary Medicine  [facility], [facility]  10/18/2022  10:52 AM

## 2023-03-23 NOTE — TELEPHONE ENCOUNTER
Agreed, ED
HELEN I advised the patient to go to L&D asap, also contacted triage nurse at Dr Arizmendi's office who will contact the patient
Pt called in stating she is dizzy, has dry mouth and very bad stomach pain. Pt states she is pregnant and she is very worried. Please advise.
Reason for Call/Chief Complaint: abdominal pain  Onset: yesterday  Nursing Assessment/Associated Symptoms: the patient and her sister were on the line, her sister helped to translate. There is strong pain R side of abdomen radiating to the L.  She has sensa
See other 2/4/17 encounter from Dr Colleen Gary office, patient did agree to go to birth center
Normal for race

## 2023-04-17 PROBLEM — O09.891 SHORT INTERVAL BETWEEN PREGNANCIES AFFECTING PREGNANCY IN FIRST TRIMESTER, ANTEPARTUM: Status: ACTIVE | Noted: 2017-10-19

## 2023-04-17 PROBLEM — O09.891 SHORT INTERVAL BETWEEN PREGNANCIES AFFECTING PREGNANCY IN FIRST TRIMESTER, ANTEPARTUM (HCC): Status: ACTIVE | Noted: 2017-10-19

## 2023-04-18 ENCOUNTER — OFFICE VISIT (OUTPATIENT)
Dept: FAMILY MEDICINE CLINIC | Facility: CLINIC | Age: 43
End: 2023-04-18

## 2023-04-18 VITALS
WEIGHT: 113 LBS | OXYGEN SATURATION: 98 % | BODY MASS INDEX: 20.02 KG/M2 | HEART RATE: 78 BPM | HEIGHT: 63 IN | SYSTOLIC BLOOD PRESSURE: 98 MMHG | TEMPERATURE: 99 F | RESPIRATION RATE: 18 BRPM | DIASTOLIC BLOOD PRESSURE: 56 MMHG

## 2023-04-18 DIAGNOSIS — J45.21 MILD INTERMITTENT ASTHMA WITH ACUTE EXACERBATION: ICD-10-CM

## 2023-04-18 DIAGNOSIS — J01.90 ACUTE NON-RECURRENT SINUSITIS, UNSPECIFIED LOCATION: Primary | ICD-10-CM

## 2023-04-18 PROCEDURE — 3074F SYST BP LT 130 MM HG: CPT

## 2023-04-18 PROCEDURE — 3078F DIAST BP <80 MM HG: CPT

## 2023-04-18 PROCEDURE — 99213 OFFICE O/P EST LOW 20 MIN: CPT

## 2023-04-18 PROCEDURE — 3008F BODY MASS INDEX DOCD: CPT

## 2023-04-18 RX ORDER — ALBUTEROL SULFATE 90 UG/1
2 AEROSOL, METERED RESPIRATORY (INHALATION) EVERY 4 HOURS PRN
Qty: 18 G | Refills: 0 | Status: SHIPPED | OUTPATIENT
Start: 2023-04-18

## 2023-04-18 RX ORDER — PREDNISONE 20 MG/1
TABLET ORAL
Qty: 12 TABLET | Refills: 0 | Status: SHIPPED | OUTPATIENT
Start: 2023-04-18

## 2023-04-18 RX ORDER — AZITHROMYCIN 250 MG/1
TABLET, FILM COATED ORAL
Qty: 6 TABLET | Refills: 0 | Status: SHIPPED | OUTPATIENT
Start: 2023-04-18 | End: 2023-04-23

## 2023-05-22 ENCOUNTER — OFFICE VISIT (OUTPATIENT)
Dept: FAMILY MEDICINE CLINIC | Facility: CLINIC | Age: 43
End: 2023-05-22

## 2023-05-22 VITALS
DIASTOLIC BLOOD PRESSURE: 55 MMHG | BODY MASS INDEX: 20.02 KG/M2 | OXYGEN SATURATION: 96 % | WEIGHT: 113 LBS | HEIGHT: 63 IN | SYSTOLIC BLOOD PRESSURE: 86 MMHG

## 2023-05-22 DIAGNOSIS — N89.8 VAGINAL DISCHARGE: ICD-10-CM

## 2023-05-22 DIAGNOSIS — R05.1 ACUTE COUGH: ICD-10-CM

## 2023-05-22 DIAGNOSIS — R42 DIZZINESS: Primary | ICD-10-CM

## 2023-05-22 DIAGNOSIS — Z12.31 ENCOUNTER FOR SCREENING MAMMOGRAM FOR BREAST CANCER: ICD-10-CM

## 2023-05-22 DIAGNOSIS — N93.9 MENSTRUAL BLEEDING PROBLEM: ICD-10-CM

## 2023-05-22 DIAGNOSIS — Z12.4 PAP SMEAR FOR CERVICAL CANCER SCREENING: ICD-10-CM

## 2023-05-22 DIAGNOSIS — J45.20 MILD INTERMITTENT REACTIVE AIRWAY DISEASE WITHOUT COMPLICATION: ICD-10-CM

## 2023-05-22 DIAGNOSIS — R10.13 EPIGASTRIC PAIN: ICD-10-CM

## 2023-05-22 PROCEDURE — 3078F DIAST BP <80 MM HG: CPT | Performed by: FAMILY MEDICINE

## 2023-05-22 PROCEDURE — 99214 OFFICE O/P EST MOD 30 MIN: CPT | Performed by: FAMILY MEDICINE

## 2023-05-22 PROCEDURE — 3008F BODY MASS INDEX DOCD: CPT | Performed by: FAMILY MEDICINE

## 2023-05-22 PROCEDURE — 3074F SYST BP LT 130 MM HG: CPT | Performed by: FAMILY MEDICINE

## 2023-05-22 RX ORDER — ALBUTEROL SULFATE 90 UG/1
2 AEROSOL, METERED RESPIRATORY (INHALATION) EVERY 4 HOURS PRN
Qty: 18 G | Refills: 1 | Status: SHIPPED | OUTPATIENT
Start: 2023-05-22

## 2023-05-23 LAB
C TRACH DNA SPEC QL NAA+PROBE: NEGATIVE
N GONORRHOEA DNA SPEC QL NAA+PROBE: NEGATIVE

## 2023-05-25 LAB — TRICHOMONAS SCREEN: NEGATIVE

## 2023-05-30 LAB — HPV I/H RISK 1 DNA SPEC QL NAA+PROBE: NEGATIVE

## 2023-06-05 ENCOUNTER — LAB ENCOUNTER (OUTPATIENT)
Dept: LAB | Age: 43
End: 2023-06-05
Attending: FAMILY MEDICINE
Payer: MEDICAID

## 2023-06-05 DIAGNOSIS — R42 DIZZINESS: ICD-10-CM

## 2023-06-05 DIAGNOSIS — R10.13 EPIGASTRIC PAIN: ICD-10-CM

## 2023-06-05 DIAGNOSIS — N93.9 MENSTRUAL BLEEDING PROBLEM: ICD-10-CM

## 2023-06-05 LAB
ALBUMIN SERPL-MCNC: 3.7 G/DL (ref 3.4–5)
ALBUMIN/GLOB SERPL: 0.9 {RATIO} (ref 1–2)
ALP LIVER SERPL-CCNC: 62 U/L
ALT SERPL-CCNC: 17 U/L
ANION GAP SERPL CALC-SCNC: 1 MMOL/L (ref 0–18)
AST SERPL-CCNC: 19 U/L (ref 15–37)
BASOPHILS # BLD AUTO: 0.04 X10(3) UL (ref 0–0.2)
BASOPHILS NFR BLD AUTO: 0.7 %
BILIRUB SERPL-MCNC: 0.3 MG/DL (ref 0.1–2)
BUN BLD-MCNC: 6 MG/DL (ref 7–18)
BUN/CREAT SERPL: 9 (ref 10–20)
CALCIUM BLD-MCNC: 9 MG/DL (ref 8.5–10.1)
CHLORIDE SERPL-SCNC: 109 MMOL/L (ref 98–112)
CO2 SERPL-SCNC: 27 MMOL/L (ref 21–32)
CREAT BLD-MCNC: 0.67 MG/DL
DEPRECATED RDW RBC AUTO: 44.2 FL (ref 35.1–46.3)
EOSINOPHIL # BLD AUTO: 0.05 X10(3) UL (ref 0–0.7)
EOSINOPHIL NFR BLD AUTO: 0.9 %
ERYTHROCYTE [DISTWIDTH] IN BLOOD BY AUTOMATED COUNT: 13.9 % (ref 11–15)
FASTING STATUS PATIENT QL REPORTED: YES
GFR SERPLBLD BASED ON 1.73 SQ M-ARVRAT: 113 ML/MIN/1.73M2 (ref 60–?)
GLOBULIN PLAS-MCNC: 3.9 G/DL (ref 2.8–4.4)
GLUCOSE BLD-MCNC: 97 MG/DL (ref 70–99)
HCG SERPL QL: NEGATIVE
HCT VFR BLD AUTO: 36.2 %
HGB BLD-MCNC: 11.9 G/DL
IMM GRANULOCYTES # BLD AUTO: 0.01 X10(3) UL (ref 0–1)
IMM GRANULOCYTES NFR BLD: 0.2 %
IRON SATN MFR SERPL: 13 %
IRON SERPL-MCNC: 60 UG/DL
LIPASE SERPL-CCNC: 33 U/L (ref 13–75)
LYMPHOCYTES # BLD AUTO: 1.77 X10(3) UL (ref 1–4)
LYMPHOCYTES NFR BLD AUTO: 32.4 %
MCH RBC QN AUTO: 28.5 PG (ref 26–34)
MCHC RBC AUTO-ENTMCNC: 32.9 G/DL (ref 31–37)
MCV RBC AUTO: 86.8 FL
MONOCYTES # BLD AUTO: 0.37 X10(3) UL (ref 0.1–1)
MONOCYTES NFR BLD AUTO: 6.8 %
NEUTROPHILS # BLD AUTO: 3.23 X10 (3) UL (ref 1.5–7.7)
NEUTROPHILS # BLD AUTO: 3.23 X10(3) UL (ref 1.5–7.7)
NEUTROPHILS NFR BLD AUTO: 59 %
OSMOLALITY SERPL CALC.SUM OF ELEC: 282 MOSM/KG (ref 275–295)
PLATELET # BLD AUTO: 296 10(3)UL (ref 150–450)
POTASSIUM SERPL-SCNC: 3.7 MMOL/L (ref 3.5–5.1)
PROT SERPL-MCNC: 7.6 G/DL (ref 6.4–8.2)
RBC # BLD AUTO: 4.17 X10(6)UL
SODIUM SERPL-SCNC: 137 MMOL/L (ref 136–145)
TIBC SERPL-MCNC: 456 UG/DL (ref 240–450)
TRANSFERRIN SERPL-MCNC: 306 MG/DL (ref 200–360)
WBC # BLD AUTO: 5.5 X10(3) UL (ref 4–11)

## 2023-06-05 PROCEDURE — 85025 COMPLETE CBC W/AUTO DIFF WBC: CPT

## 2023-06-05 PROCEDURE — 84703 CHORIONIC GONADOTROPIN ASSAY: CPT

## 2023-06-05 PROCEDURE — 83540 ASSAY OF IRON: CPT

## 2023-06-05 PROCEDURE — 36415 COLL VENOUS BLD VENIPUNCTURE: CPT

## 2023-06-05 PROCEDURE — 83690 ASSAY OF LIPASE: CPT

## 2023-06-05 PROCEDURE — 80053 COMPREHEN METABOLIC PANEL: CPT

## 2023-06-05 PROCEDURE — 84466 ASSAY OF TRANSFERRIN: CPT

## 2023-06-07 ENCOUNTER — LAB ENCOUNTER (OUTPATIENT)
Dept: LAB | Age: 43
End: 2023-06-07
Attending: FAMILY MEDICINE
Payer: MEDICAID

## 2023-06-07 DIAGNOSIS — R10.13 EPIGASTRIC PAIN: ICD-10-CM

## 2023-06-07 PROCEDURE — 87338 HPYLORI STOOL AG IA: CPT

## 2023-06-13 DIAGNOSIS — A04.8 H. PYLORI INFECTION: Primary | ICD-10-CM

## 2023-06-13 DIAGNOSIS — R10.10 UPPER ABDOMINAL PAIN: ICD-10-CM

## 2023-06-13 LAB — H PYLORI AG STL QL IA: POSITIVE

## 2023-06-13 RX ORDER — CLARITHROMYCIN 500 MG/1
500 TABLET, COATED ORAL 2 TIMES DAILY
Qty: 28 TABLET | Refills: 0 | Status: SHIPPED | OUTPATIENT
Start: 2023-06-13 | End: 2023-06-27

## 2023-06-13 RX ORDER — AMOXICILLIN 500 MG/1
1000 TABLET, FILM COATED ORAL 2 TIMES DAILY
Qty: 56 TABLET | Refills: 0 | Status: SHIPPED | OUTPATIENT
Start: 2023-06-13 | End: 2023-06-27

## 2023-06-13 RX ORDER — OMEPRAZOLE 20 MG/1
20 CAPSULE, DELAYED RELEASE ORAL
Qty: 60 CAPSULE | Refills: 0 | Status: SHIPPED | OUTPATIENT
Start: 2023-06-13 | End: 2023-07-13

## 2023-07-25 DIAGNOSIS — A04.8 H. PYLORI INFECTION: ICD-10-CM

## 2023-07-25 NOTE — TELEPHONE ENCOUNTER
Please contact patient :      omeprazole 20 MG Oral Capsule Delayed Release Take 1 capsule (20 mg total) by mouth before breakfast. (Patient not taking: Reported on 4/18/2023) 90 capsule 1         Refill passed per [employer] [facility] protocol    Requested Prescriptions   Pending Prescriptions Disp Refills    OMEPRAZOLE 20 MG Oral Capsule Delayed Release [Pharmacy Med Name: OMEPRAZOLE 20MG CAPSULES] 60 capsule 0     Sig: TAKE 1 CAPSULE(20 MG) BY MOUTH TWICE DAILY BEFORE MEALS       Gastrointestional Medication Protocol Passed - 7/25/2023  2:51 PM        Passed - In person appointment or virtual visit in the past 12 mos or appointment in next 3 mos     Recent Outpatient Visits              2 months ago [facility], [address] [facility], Rica Pinzon MD    Office Visit    3 months ago Acute non-recurrent sinusitis, unspecified location    Bouchra Ibrahim Ruston, APRN    Office Visit    9 months ago Dizziness    [address] [place], [place]    Office Visit    11 months ago Lung nodule    [address] [place], [place]    Office Visit    11 months ago Depressed mood    Rica Ibrahim MD    Office Visit          Future Appointments         Provider Department Appt Notes    In 2 weeks [facility] THE [address] for [facility] 1st mammogram                    Future Appointments         Provider Department Appt Notes    In 2 weeks [facility] THE [address] [facility] 1st mammogram            Recent Outpatient Visits              2 months ago Loree Blair MD    Office Visit    3 months ago Acute non-recurrent sinusitis, unspecified location    [facility], [address] [facility] Dwaine Mcgee APRN    Office Visit    9 months ago Dizziness    [facility], [address] [place] [address], [place], [place]    Office Visit    11 months ago Lung nodule    [facility], [address] [place] [address], [address] [place] [address]    Office Visit    11 months ago Depressed mood    [facility], Vale Nava MD    Office Visit

## 2023-07-27 DIAGNOSIS — A04.8 H. PYLORI INFECTION: ICD-10-CM

## 2023-07-27 RX ORDER — OMEPRAZOLE 20 MG/1
20 CAPSULE, DELAYED RELEASE ORAL
Qty: 180 CAPSULE | Refills: 0 | OUTPATIENT
Start: 2023-07-27

## 2023-07-27 RX ORDER — OMEPRAZOLE 20 MG/1
20 CAPSULE, DELAYED RELEASE ORAL
Qty: 90 CAPSULE | Refills: 0 | Status: SHIPPED | OUTPATIENT
Start: 2023-07-27

## 2023-08-21 ENCOUNTER — HOSPITAL ENCOUNTER (OUTPATIENT)
Dept: MAMMOGRAPHY | Facility: HOSPITAL | Age: 43
Discharge: HOME | End: 2023-08-21
Attending: FAMILY MEDICINE
Payer: MEDICAID

## 2023-08-21 DIAGNOSIS — Z12.31 ENCOUNTER FOR SCREENING MAMMOGRAM FOR BREAST CANCER: ICD-10-CM

## 2023-08-21 PROCEDURE — 77067 SCR MAMMO BI INCL CAD: CPT | Performed by: FAMILY MEDICINE

## 2023-08-21 PROCEDURE — 77063 BREAST TOMOSYNTHESIS BI: CPT | Performed by: FAMILY MEDICINE

## 2023-08-28 ENCOUNTER — HOSPITAL ENCOUNTER (OUTPATIENT)
Dept: ULTRASOUND IMAGING | Facility: HOSPITAL | Age: 43
Discharge: HOME | End: 2023-08-28
Attending: FAMILY MEDICINE
Payer: MEDICAID

## 2023-08-28 ENCOUNTER — HOSPITAL ENCOUNTER (OUTPATIENT)
Dept: MAMMOGRAPHY | Facility: HOSPITAL | Age: 43
Discharge: HOME | End: 2023-08-28
Attending: FAMILY MEDICINE
Payer: MEDICAID

## 2023-08-28 DIAGNOSIS — R92.8 ABNORMAL MAMMOGRAM: ICD-10-CM

## 2023-08-28 PROCEDURE — 77062 BREAST TOMOSYNTHESIS BI: CPT | Performed by: FAMILY MEDICINE

## 2023-08-28 PROCEDURE — 77066 DX MAMMO INCL CAD BI: CPT | Performed by: FAMILY MEDICINE

## 2023-08-28 PROCEDURE — 76642 ULTRASOUND BREAST LIMITED: CPT | Performed by: FAMILY MEDICINE

## 2023-09-03 DIAGNOSIS — R92.1 BREAST CALCIFICATIONS ON MAMMOGRAM: Primary | ICD-10-CM

## 2023-09-03 DIAGNOSIS — R22.32 MASS OF LEFT AXILLA: ICD-10-CM

## 2023-09-06 DIAGNOSIS — A04.8 H. PYLORI INFECTION: ICD-10-CM

## 2023-09-07 RX ORDER — OMEPRAZOLE 20 MG/1
20 CAPSULE, DELAYED RELEASE ORAL EVERY MORNING
Qty: 30 CAPSULE | Refills: 0 | Status: SHIPPED | OUTPATIENT
Start: 2023-09-07 | End: 2023-10-07

## 2023-11-30 RX ORDER — ALBUTEROL SULFATE 90 UG/1
2 AEROSOL, METERED RESPIRATORY (INHALATION) EVERY 4 HOURS PRN
Qty: 18 G | Refills: 0 | Status: SHIPPED | OUTPATIENT
Start: 2023-11-30

## 2023-11-30 NOTE — TELEPHONE ENCOUNTER
Please review; protocol failed. Short supply given on 11/30/2023, appointment needed for further refills.       Requested Prescriptions   Pending Prescriptions Disp Refills    ALBUTEROL 108 (90 Base) MCG/ACT Inhalation Aero Soln [Pharmacy Med Name: ALBUTEROL HFA INH(200 PUFFS) 18GM] 18 g 0     Sig: INHALE 2 PUFFS BY MOUTH INTO THE LUNGS EVERY 4 HOURS AS NEEDED       Asthma & COPD Medication Protocol Failed - 11/29/2023  7:35 PM        Failed - In person appointment or virtual visit in the past 6 mos or appointment in next 3 mos     Recent Outpatient Visits              6 months ago [facility]    Office Visit    7 months ago Acute non-recurrent sinusitis, unspecified location    [address] [facility] PENNY Sousa    Office Visit    1 year ago Dizziness    [place], [place]    Office Visit    1 year ago Lung nodule    [address] [facility] [place]    Office Visit    1 year ago Depressed mood    [address] [facility], Vale Nava MD    Office Visit                         Recent Outpatient Visits              6 months ago Jessica Tripathi MD    Office Visit    7 months ago Acute non-recurrent sinusitis, unspecified location    [address] [facility] PENNY Sousa    Office Visit    1 year ago Dizziness    [address] [facility], [place]    Office Visit    1 year ago Lung nodule    [place]    Office Visit    1 year ago Depressed mood    Jose Davis MD    Office Visit

## 2023-12-31 ENCOUNTER — TELEPHONE (OUTPATIENT)
Dept: FAMILY MEDICINE CLINIC | Facility: CLINIC | Age: 43
End: 2023-12-31

## 2023-12-31 DIAGNOSIS — A04.8 H. PYLORI INFECTION: ICD-10-CM

## 2024-01-02 RX ORDER — OMEPRAZOLE 20 MG/1
20 CAPSULE, DELAYED RELEASE ORAL EVERY MORNING
Qty: 90 CAPSULE | Refills: 0 | Status: SHIPPED | OUTPATIENT
Start: 2024-01-02

## 2024-01-02 NOTE — TELEPHONE ENCOUNTER
Refill passed per [facility] protocol.  Requested Prescriptions   Pending Prescriptions Disp Refills    OMEPRAZOLE 20 MG Oral Capsule Delayed Release [Pharmacy Med Name: OMEPRAZOLE 20MG CAPSULES] 30 capsule 0     Sig: TAKE 1 CAPSULE(20 MG) BY MOUTH EVERY MORNING       Gastrointestional Medication Protocol Passed - 12/31/2023 11:15 AM        Passed - In person appointment or virtual visit in the past 12 mos or appointment in next 3 mos     Recent Outpatient Visits              7 months ago Dizziness    [facility], [facility], Loni Childers MD    Office Visit    8 months ago Acute non-recurrent sinusitis, unspecified location    [facility], [address], [place] APRN    Office Visit    1 year ago Dizziness    [facility], [facility], Francisca Reese, DO    Office Visit    1 year ago Lung nodule    [facility], Francisca Reese, DO    Office Visit    1 year ago Depressed mood    [facility], [facility], Loni Childers MD    Office Visit                         Recent Outpatient Visits              7 months ago Dizziness    [facility], [facility], Loni Childers MD    Office Visit    8 months ago Acute non-recurrent sinusitis, unspecified location    [facility], [address], Litzy Simpson APRN    Office Visit    1 year ago Dizziness    [facility], Francisca Reese,     Office Visit    1 year ago Lung nodule    [facility], [facility], Francisca Reese, DO    Office Visit    1 year ago Depressed mood    [facility], [facility], Loni Childers MD    Office Visit

## 2024-01-02 NOTE — TELEPHONE ENCOUNTER
Please call patient to make an appointment, 90 day refill sent ,and Lapio message sent,thank you.

## 2024-02-06 ENCOUNTER — OFFICE VISIT (OUTPATIENT)
Dept: FAMILY MEDICINE CLINIC | Facility: CLINIC | Age: 44
End: 2024-02-06

## 2024-02-06 VITALS
SYSTOLIC BLOOD PRESSURE: 107 MMHG | WEIGHT: 117 LBS | HEART RATE: 89 BPM | BODY MASS INDEX: 20.73 KG/M2 | OXYGEN SATURATION: 100 % | HEIGHT: 63 IN | DIASTOLIC BLOOD PRESSURE: 70 MMHG

## 2024-02-06 DIAGNOSIS — F32.A ANXIETY AND DEPRESSION: ICD-10-CM

## 2024-02-06 DIAGNOSIS — G43.C0 PERIODIC HEADACHE SYNDROME, NOT INTRACTABLE: ICD-10-CM

## 2024-02-06 DIAGNOSIS — R51.9 HEADACHE DISORDER: Primary | ICD-10-CM

## 2024-02-06 DIAGNOSIS — F41.9 ANXIETY AND DEPRESSION: ICD-10-CM

## 2024-02-06 PROCEDURE — 99214 OFFICE O/P EST MOD 30 MIN: CPT | Performed by: FAMILY MEDICINE

## 2024-02-06 RX ORDER — SUMATRIPTAN 50 MG/1
50 TABLET, FILM COATED ORAL EVERY 2 HOUR PRN
Qty: 9 TABLET | Refills: 1 | Status: SHIPPED | OUTPATIENT
Start: 2024-02-06

## 2024-02-06 RX ORDER — ESCITALOPRAM OXALATE 5 MG/1
5 TABLET ORAL DAILY
Qty: 30 TABLET | Refills: 1 | Status: SHIPPED | OUTPATIENT
Start: 2024-02-06
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
## 2024-02-21 ENCOUNTER — OFFICE VISIT (OUTPATIENT)
Dept: FAMILY MEDICINE CLINIC | Facility: CLINIC | Age: 44
End: 2024-02-21

## 2024-02-21 VITALS
SYSTOLIC BLOOD PRESSURE: 115 MMHG | HEIGHT: 63 IN | HEART RATE: 106 BPM | BODY MASS INDEX: 20.55 KG/M2 | DIASTOLIC BLOOD PRESSURE: 72 MMHG | TEMPERATURE: 99 F | WEIGHT: 116 LBS

## 2024-02-21 DIAGNOSIS — J39.9 UPPER RESPIRATORY DISEASE: Primary | ICD-10-CM

## 2024-02-21 PROBLEM — J01.90 ACUTE NON-RECURRENT SINUSITIS: Status: RESOLVED | Noted: 2023-04-18 | Resolved: 2024-02-21

## 2024-02-21 PROCEDURE — 99213 OFFICE O/P EST LOW 20 MIN: CPT | Performed by: PHYSICIAN ASSISTANT

## 2024-02-21 NOTE — PROGRESS NOTES
HPI:     Upper Respiratory Infection   This is a new problem. Episode onset: 4 days. The problem has been unchanged. Associated symptoms include congestion, coughing, headaches and rhinorrhea. Pertinent negatives include no abdominal pain, chest pain, diarrhea, ear pain, nausea, rash, sinus pain, sore throat, vomiting or wheezing. She has tried acetaminophen for the symptoms. The treatment provided no relief.   Patient uses Albuterol inhaler for SOB sometimes.    Medications:     Current Outpatient Medications   Medication Sig Dispense Refill    albuterol 108 (90 Base) MCG/ACT Inhalation Aero Soln Inhale 2 puffs into the lungs every 4 (four) hours as needed. 18 g 0    Spacer/Aero-Holding Chambers Does not apply Device Use with inhaler as needed 1 each 0    SUMAtriptan (IMITREX) 50 MG Oral Tab Take 1 tablet (50 mg total) by mouth every 2 (two) hours as needed for Migraine. Use at onset; repeat once after 2 HRS-ONLY 2 IN 24 HR MAX (Patient not taking: Reported on 2/21/2024) 9 tablet 1    escitalopram (LEXAPRO) 5 MG Oral Tab Take 1 tablet (5 mg total) by mouth daily. (Patient not taking: Reported on 2/21/2024) 30 tablet 1    omeprazole 20 MG Oral Capsule Delayed Release Take 1 capsule (20 mg total) by mouth every morning. (Patient not taking: Reported on 2/21/2024) 90 capsule 0    Fluticasone-Salmeterol (AIRDUO RESPICLICK 113/14) 113-14 MCG/ACT Inhalation Aerosol Powder, Breath Activated Inhale 1 puff into the lungs 2 (two) times a day. (Patient not taking: Reported on 2/6/2024) 1 each 5       Allergies:     Allergies   Allergen Reactions    Ibuprofen OTHER (SEE COMMENTS)     Stomach discomfort        History:     Health Maintenance   Topic Date Due    Asthma Action Plan  Never done    Asthma Control Test  Never done    Pneumococcal Vaccine: Birth to 66yrs (1 of 2 - PCV) Never done    Annual Physical  03/07/2023    COVID-19 Vaccine (4 - 2023-24 season) 09/01/2023    Influenza Vaccine (1) 10/01/2023    Annual Depression  Screening  01/01/2024    Mammogram  08/28/2024    Pap Smear  05/22/2026    DTaP,Tdap,and Td Vaccines (2 - Td or Tdap) 01/16/2027       Patient's last menstrual period was 01/27/2024 (exact date).   Past Medical History:     Past Medical History:   Diagnosis Date    Asthma (HCC)     Hx of tuberculosis     Ulcer        Past Surgical History:   No past surgical history on file.    Family History:     Family History   Problem Relation Age of Onset    Heart Disease Father     Breast Cancer Neg     Ovarian Cancer Neg        Social History:     Social History     Socioeconomic History    Marital status:      Spouse name: Not on file    Number of children: Not on file    Years of education: Not on file    Highest education level: Not on file   Occupational History    Not on file   Tobacco Use    Smoking status: Never    Smokeless tobacco: Never   Substance and Sexual Activity    Alcohol use: No     Alcohol/week: 0.0 standard drinks of alcohol    Drug use: No    Sexual activity: Yes     Partners: Male   Other Topics Concern     Service Not Asked    Blood Transfusions Not Asked    Caffeine Concern Yes     Comment: tea one cup    Occupational Exposure Not Asked    Hobby Hazards Not Asked    Sleep Concern Not Asked    Stress Concern Not Asked    Weight Concern Not Asked    Special Diet Not Asked    Back Care Not Asked    Exercise Not Asked    Bike Helmet Not Asked    Seat Belt Not Asked    Self-Exams Not Asked   Social History Narrative    Not on file     Social Determinants of Health     Financial Resource Strain: Not on file   Food Insecurity: Not on file   Transportation Needs: Not on file   Physical Activity: Not on file   Stress: Not on file   Social Connections: Not on file   Housing Stability: Not on file       Review of Systems:   Review of Systems   Constitutional:  Negative for activity change, chills, fatigue and fever.   HENT:  Positive for congestion and rhinorrhea. Negative for ear discharge, ear  pain, postnasal drip, sinus pressure, sinus pain and sore throat.    Respiratory:  Positive for cough and shortness of breath. Negative for chest tightness and wheezing.    Cardiovascular:  Negative for chest pain and palpitations.   Gastrointestinal:  Negative for abdominal distention, abdominal pain, blood in stool, constipation, diarrhea, nausea and vomiting.   Skin:  Negative for rash.   Neurological:  Positive for headaches.        Vitals:    02/21/24 1024   BP: 115/72   Pulse: 106   Temp: 98.5 °F (36.9 °C)   TempSrc: Temporal   Weight: 116 lb (52.6 kg)   Height: 5' 3\" (1.6 m)     Body mass index is 20.55 kg/m².    Physical Exam:   Physical Exam  Vitals reviewed.   Constitutional:       General: She is not in acute distress.     Appearance: She is well-developed.   HENT:      Head: Normocephalic and atraumatic.      Right Ear: Tympanic membrane, ear canal and external ear normal. There is no impacted cerumen.      Left Ear: Tympanic membrane, ear canal and external ear normal. There is no impacted cerumen.      Nose: Congestion present.      Right Sinus: No maxillary sinus tenderness or frontal sinus tenderness.      Left Sinus: No maxillary sinus tenderness or frontal sinus tenderness.      Mouth/Throat:      Mouth: Mucous membranes are moist.      Pharynx: Oropharynx is clear. No oropharyngeal exudate or posterior oropharyngeal erythema.   Eyes:      General:         Right eye: No discharge.         Left eye: No discharge.      Conjunctiva/sclera: Conjunctivae normal.   Cardiovascular:      Rate and Rhythm: Normal rate and regular rhythm.      Heart sounds: Normal heart sounds, S1 normal and S2 normal. No murmur heard.  Pulmonary:      Effort: Pulmonary effort is normal.      Breath sounds: Normal breath sounds. No wheezing or rales.   Chest:      Chest wall: No tenderness.   Lymphadenopathy:      Cervical: No cervical adenopathy.   Skin:     Findings: No rash.   Neurological:      Mental Status: She is alert  and oriented to person, place, and time.   Psychiatric:         Behavior: Behavior is cooperative.          Assessment and Plan::     Problem List Items Addressed This Visit    None  Visit Diagnoses       Upper respiratory disease    -  Primary    Relevant Orders    COVID-19 Testing by PCR ([facility]) [E]        Supportive care includes:    encourage fluid intake   Advise patient to take Tylenol/Ibuprofen as needed for pain.  warm salt water gargles   Rest  May take Cold Flu OTC  humidifier     Discussed plan of care with pt and pt is in agreement.All questions answered. Pt to call with questions or concerns.

## 2024-02-22 LAB — SARS-COV-2 RNA RESP QL NAA+PROBE: NOT DETECTED

## 2024-03-06 ENCOUNTER — HOSPITAL ENCOUNTER (OUTPATIENT)
Dept: CT IMAGING | Facility: HOSPITAL | Age: 44
Discharge: HOME | End: 2024-03-06
Attending: FAMILY MEDICINE
Payer: MEDICAID

## 2024-03-06 DIAGNOSIS — R51.9 HEADACHE DISORDER: ICD-10-CM

## 2024-03-06 PROCEDURE — 70450 CT HEAD/BRAIN W/O DYE: CPT | Performed by: FAMILY MEDICINE

## 2024-03-11 ENCOUNTER — LAB ENCOUNTER (OUTPATIENT)
Dept: LAB | Age: 44
End: 2024-03-11
Attending: FAMILY MEDICINE
Payer: MEDICAID

## 2024-03-11 ENCOUNTER — OFFICE VISIT (OUTPATIENT)
Dept: FAMILY MEDICINE CLINIC | Facility: CLINIC | Age: 44
End: 2024-03-11

## 2024-03-11 VITALS
OXYGEN SATURATION: 98 % | DIASTOLIC BLOOD PRESSURE: 61 MMHG | HEART RATE: 76 BPM | SYSTOLIC BLOOD PRESSURE: 93 MMHG | HEIGHT: 63 IN | BODY MASS INDEX: 20.73 KG/M2 | WEIGHT: 117 LBS

## 2024-03-11 DIAGNOSIS — R42 DIZZINESS: Primary | ICD-10-CM

## 2024-03-11 DIAGNOSIS — E55.9 VITAMIN D DEFICIENCY: ICD-10-CM

## 2024-03-11 DIAGNOSIS — F32.A ANXIETY AND DEPRESSION: ICD-10-CM

## 2024-03-11 DIAGNOSIS — R42 DIZZINESS: ICD-10-CM

## 2024-03-11 DIAGNOSIS — F41.9 ANXIETY AND DEPRESSION: ICD-10-CM

## 2024-03-11 LAB
ALBUMIN SERPL-MCNC: 4.4 G/DL (ref 3.2–4.8)
ALBUMIN/GLOB SERPL: 1.3 {RATIO} (ref 1–2)
ALP LIVER SERPL-CCNC: 77 U/L
ALT SERPL-CCNC: 8 U/L
ANION GAP SERPL CALC-SCNC: 5 MMOL/L (ref 0–18)
AST SERPL-CCNC: 17 U/L (ref ?–34)
BASOPHILS # BLD AUTO: 0.06 X10(3) UL (ref 0–0.2)
BASOPHILS NFR BLD AUTO: 1.2 %
BILIRUB SERPL-MCNC: 0.5 MG/DL (ref 0.3–1.2)
BUN BLD-MCNC: 8 MG/DL (ref 9–23)
BUN/CREAT SERPL: 10.7 (ref 10–20)
CALCIUM BLD-MCNC: 9.5 MG/DL (ref 8.7–10.4)
CHLORIDE SERPL-SCNC: 109 MMOL/L (ref 98–112)
CO2 SERPL-SCNC: 27 MMOL/L (ref 21–32)
CREAT BLD-MCNC: 0.75 MG/DL
DEPRECATED RDW RBC AUTO: 45.1 FL (ref 35.1–46.3)
EGFRCR SERPLBLD CKD-EPI 2021: 103 ML/MIN/1.73M2 (ref 60–?)
EOSINOPHIL # BLD AUTO: 0.14 X10(3) UL (ref 0–0.7)
EOSINOPHIL NFR BLD AUTO: 2.7 %
ERYTHROCYTE [DISTWIDTH] IN BLOOD BY AUTOMATED COUNT: 14.4 % (ref 11–15)
FASTING STATUS PATIENT QL REPORTED: NO
GLOBULIN PLAS-MCNC: 3.5 G/DL (ref 2.8–4.4)
GLUCOSE BLD-MCNC: 96 MG/DL (ref 70–99)
HCT VFR BLD AUTO: 38 %
HGB BLD-MCNC: 12.6 G/DL
IMM GRANULOCYTES # BLD AUTO: 0.01 X10(3) UL (ref 0–1)
IMM GRANULOCYTES NFR BLD: 0.2 %
LYMPHOCYTES # BLD AUTO: 1.4 X10(3) UL (ref 1–4)
LYMPHOCYTES NFR BLD AUTO: 26.9 %
MCH RBC QN AUTO: 28.6 PG (ref 26–34)
MCHC RBC AUTO-ENTMCNC: 33.2 G/DL (ref 31–37)
MCV RBC AUTO: 86.4 FL
MONOCYTES # BLD AUTO: 0.37 X10(3) UL (ref 0.1–1)
MONOCYTES NFR BLD AUTO: 7.1 %
NEUTROPHILS # BLD AUTO: 3.22 X10 (3) UL (ref 1.5–7.7)
NEUTROPHILS # BLD AUTO: 3.22 X10(3) UL (ref 1.5–7.7)
NEUTROPHILS NFR BLD AUTO: 61.9 %
OSMOLALITY SERPL CALC.SUM OF ELEC: 290 MOSM/KG (ref 275–295)
PLATELET # BLD AUTO: 325 10(3)UL (ref 150–450)
POTASSIUM SERPL-SCNC: 3.9 MMOL/L (ref 3.5–5.1)
PROT SERPL-MCNC: 7.9 G/DL (ref 5.7–8.2)
RBC # BLD AUTO: 4.4 X10(6)UL
SODIUM SERPL-SCNC: 141 MMOL/L (ref 136–145)
TSI SER-ACNC: 2.23 MIU/ML (ref 0.55–4.78)
VIT B12 SERPL-MCNC: 519 PG/ML (ref 211–911)
VIT D+METAB SERPL-MCNC: 18.7 NG/ML (ref 30–100)
WBC # BLD AUTO: 5.2 X10(3) UL (ref 4–11)

## 2024-03-11 PROCEDURE — 85025 COMPLETE CBC W/AUTO DIFF WBC: CPT

## 2024-03-11 PROCEDURE — 82607 VITAMIN B-12: CPT

## 2024-03-11 PROCEDURE — 84443 ASSAY THYROID STIM HORMONE: CPT

## 2024-03-11 PROCEDURE — 36415 COLL VENOUS BLD VENIPUNCTURE: CPT

## 2024-03-11 PROCEDURE — 80053 COMPREHEN METABOLIC PANEL: CPT

## 2024-03-11 PROCEDURE — 82306 VITAMIN D 25 HYDROXY: CPT

## 2024-03-11 PROCEDURE — 99214 OFFICE O/P EST MOD 30 MIN: CPT | Performed by: FAMILY MEDICINE

## 2024-03-11 PROCEDURE — 93005 ELECTROCARDIOGRAM TRACING: CPT

## 2024-03-11 PROCEDURE — 93010 ELECTROCARDIOGRAM REPORT: CPT | Performed by: STUDENT IN AN ORGANIZED HEALTH CARE EDUCATION/TRAINING PROGRAM

## 2024-03-11 NOTE — PROGRESS NOTES
HPI:    Patient ID: Sabino Manning is a 43 year old female.      HPI    Chief Complaint   Patient presents with    Follow - Up     On headache and depression pt states she felt dizziness, hand shakiness and rapid heart beat when she started lexapro took it for 3 days and discontinued it. Dizziness has been going on for a few months.        Wt Readings from Last 6 Encounters:   03/11/24 117 lb (53.1 kg)   02/21/24 116 lb (52.6 kg)   02/06/24 117 lb (53.1 kg)   05/22/23 113 lb (51.3 kg)   04/18/23 113 lb (51.3 kg)   10/18/22 105 lb (47.6 kg)     BP Readings from Last 3 Encounters:   03/11/24 93/61   02/21/24 115/72   02/06/24 107/70     Tried lexapro for 3 days and couldn't function  Was shaking and felt very dizzy.    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    Headache resolved    CT head negative 3/6/24    Still gets dizzy., when she stands uo from sitting.  Vision is good.  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    Sleeps 6 hrs at night      Review of Systems   Constitutional:  Negative for activity change, appetite change, chills, fatigue, fever and unexpected weight change.   Respiratory:  Negative for cough and shortness of breath.    Cardiovascular:  Negative for chest pain, palpitations and leg swelling.   Gastrointestinal:  Negative for abdominal pain, constipation and diarrhea.   Neurological:  Positive for dizziness. Negative for syncope, speech difficulty, weakness, light-headedness, numbness and headaches.   Psychiatric/Behavioral:  Negative for agitation, behavioral problems, confusion, decreased concentration, dysphoric mood, hallucinations, self-injury, sleep disturbance and suicidal ideas. The patient is nervous/anxious. The patient is not hyperactive.        BP 93/61   Pulse 76   Ht 5' 3\" (1.6 m)   Wt 117 lb (53.1 kg)   LMP 03/10/2024 (Exact Date)   SpO2 98%   BMI  20.73 kg/m²          Current Outpatient Medications   Medication Sig Dispense Refill    omeprazole 20 MG Oral Capsule Delayed Release Take 1 capsule (20 mg total) by mouth every morning. 90 capsule 0    albuterol 108 (90 Base) MCG/ACT Inhalation Aero Soln Inhale 2 puffs into the lungs every 4 (four) hours as needed. 18 g 0    Spacer/Aero-Holding Chambers Does not apply Device Use with inhaler as needed 1 each 0    Fluticasone-Salmeterol (AIRDUO RESPICLICK 113/14) 113-14 MCG/ACT Inhalation Aerosol Powder, Breath Activated Inhale 1 puff into the lungs 2 (two) times a day. 1 each 5    SUMAtriptan (IMITREX) 50 MG Oral Tab Take 1 tablet (50 mg total) by mouth every 2 (two) hours as needed for Migraine. Use at onset; repeat once after 2 HRS-ONLY 2 IN 24 HR MAX (Patient not taking: Reported on 2/21/2024) 9 tablet 1     Allergies:  Allergies   Allergen Reactions    Ibuprofen OTHER (SEE COMMENTS)     Stomach discomfort       PHYSICAL EXAM:     Chief Complaint   Patient presents with    Follow - Up     On headache and depression pt states she felt dizziness, hand shakiness and rapid heart beat when she started lexapro took it for 3 days and discontinued it. Dizziness has been going on for a few months.       Physical Exam  Vitals and nursing note reviewed.   Constitutional:       Appearance: She is well-developed.   HENT:      Head: Normocephalic.      Right Ear: Tympanic membrane and ear canal normal.      Left Ear: Tympanic membrane and ear canal normal.   Eyes:      Pupils: Pupils are equal, round, and reactive to light.   Neck:      Thyroid: No thyromegaly.   Cardiovascular:      Rate and Rhythm: Normal rate and regular rhythm.      Heart sounds: No murmur heard.  Pulmonary:      Effort: Pulmonary effort is normal.      Breath sounds: Normal breath sounds. No wheezing.   Abdominal:      Palpations: There is no mass.      Tenderness: There is no abdominal tenderness. There is no right CVA tenderness or left CVA tenderness.    Musculoskeletal:      Cervical back: Normal range of motion and neck supple.      Right lower leg: No edema.      Left lower leg: No edema.   Skin:     General: Skin is warm and dry.      Findings: No rash.   Neurological:      Mental Status: She is alert and oriented to person, place, and time.   Psychiatric:         Attention and Perception: Attention and perception normal.         Mood and Affect: Mood is anxious and depressed.         Speech: Speech normal.         Behavior: Behavior normal. Behavior is cooperative.         Thought Content: Thought content normal.         Cognition and Memory: Cognition and memory normal.                ASSESSMENT/PLAN:     Encounter Diagnoses   Name Primary?    Dizziness Yes    Anxiety and depression     Vitamin D deficiency        1. Dizziness  Unclear etiology  Reviewed CT head  Recheck labs, EKG  To see neuro  for eval   ? Vertigo   - EKG 12 Lead; Future  - CBC With Differential With Platelet; Future  - TSH W Reflex To Free T4; Future  - Comp Metabolic Panel (14); Future  - Vitamin B12; Future  - NEURO - INTERNAL  Continue to push fluids, eat meals on time  If worsening go to ER     2. Anxiety and depression  Had an adverse reaction to lexapro  Afraid to try new meds  She states she works FT  No time for therapy and language challenges as she is from [facility]     3. Vitamin D deficiency  Takes OTC vitamin d   Check levels   - Vitamin D [E]; Future      Orders Placed This Encounter   Procedures    CBC With Differential With Platelet    TSH W Reflex To Free T4    Comp Metabolic Panel (14)    Vitamin B12    Vitamin D [E]         The above note was creating using Dragon speech recognition technology. Please excuse any typos    Meds This Visit:  Requested Prescriptions      No prescriptions requested or ordered in this encounter       Imaging & Referrals:  NEURO - INTERNAL       ID#[id number]

## 2024-03-12 DIAGNOSIS — E55.9 VITAMIN D DEFICIENCY: Primary | ICD-10-CM

## 2024-03-12 LAB
ATRIAL RATE: 63 BPM
P AXIS: 54 DEGREES
P-R INTERVAL: 136 MS
Q-T INTERVAL: 390 MS
QRS DURATION: 72 MS
QTC CALCULATION (BEZET): 399 MS
R AXIS: 76 DEGREES
T AXIS: 55 DEGREES
VENTRICULAR RATE: 63 BPM

## 2024-05-22 ENCOUNTER — HOSPITAL ENCOUNTER (OUTPATIENT)
Dept: MAMMOGRAPHY | Facility: HOSPITAL | Age: 44
Discharge: HOME | End: 2024-05-22
Attending: FAMILY MEDICINE

## 2024-05-22 ENCOUNTER — HOSPITAL ENCOUNTER (OUTPATIENT)
Dept: ULTRASOUND IMAGING | Facility: HOSPITAL | Age: 44
Discharge: HOME | End: 2024-05-22
Attending: FAMILY MEDICINE

## 2024-05-22 DIAGNOSIS — R92.1 BREAST CALCIFICATION SEEN ON MAMMOGRAM: Primary | ICD-10-CM

## 2024-05-22 DIAGNOSIS — R92.1 BREAST CALCIFICATIONS ON MAMMOGRAM: ICD-10-CM

## 2024-05-22 PROCEDURE — 76642 ULTRASOUND BREAST LIMITED: CPT | Performed by: FAMILY MEDICINE

## 2024-05-22 PROCEDURE — 77062 BREAST TOMOSYNTHESIS BI: CPT | Performed by: FAMILY MEDICINE

## 2024-05-22 PROCEDURE — 77066 DX MAMMO INCL CAD BI: CPT | Performed by: FAMILY MEDICINE

## 2024-06-04 DIAGNOSIS — A04.8 H. PYLORI INFECTION: ICD-10-CM

## 2024-06-04 NOTE — TELEPHONE ENCOUNTER
omeprazole 20 MG Oral Capsule Delayed Release, Take 1 capsule (20 mg total) by mouth every morning., Disp: 90 capsule, Rfl: 0

## 2024-06-07 RX ORDER — OMEPRAZOLE 20 MG/1
20 CAPSULE, DELAYED RELEASE ORAL EVERY MORNING
Qty: 90 CAPSULE | Refills: 3 | Status: SHIPPED | OUTPATIENT
Start: 2024-06-07

## 2024-06-07 NOTE — TELEPHONE ENCOUNTER
REFILL PASSED PER [facility] PROTOCOLS    Requested Prescriptions   Pending Prescriptions Disp Refills    omeprazole 20 MG Oral Capsule Delayed Release 90 capsule 3     Sig: Take 1 capsule (20 mg total) by mouth every morning.       Gastrointestional Medication Protocol Passed - 6/4/2024  5:11 PM        Passed - In person appointment or virtual visit in the past 12 mos or appointment in next 3 mos     Recent Outpatient Visits              2 months ago Dizziness    [facility] Loni Childers MD    Office Visit    3 months ago Upper respiratory disease    [facility], [address], [place] Nagi Ovalle PA-C    Office Visit    4 months ago Headache disorder    [facility], Loni Childers MD    Office Visit    1 year ago Dizziness    [facility] Loni Childers MD    Office Visit    1 year ago [facility]-re[facility]          Future Appointments         Provider Department Appt Notes    In 1 month Aidan Engel MD [facility] Dizziness                         Future Appointments         Provider Department Appt Notes    In 1 month Aidan Engel MD [facility] Dizziness          Recent Outpatient Visits              2 months ago Dizziness    [facility] Loni Childers MD    Office Visit    3 months ago Upper respiratory disease    [facility], [address], [place] Nagi Ovalle PA-C    Office Visit    4 months ago Headache disorder    [facility] Loni Childers MD    Office Visit    1 year ago Dizziness    [facility],  Dwaine Muse Asma M, MD    Office Visit    1 year ago Acute non-recurrent sinusitis, unspecified location    [facility], Dwaine Muse Jennifer, APRN    Office Visit

## 2024-07-16 ENCOUNTER — OFFICE VISIT (OUTPATIENT)
Dept: NEUROLOGY | Facility: CLINIC | Age: 44
End: 2024-07-16
Payer: MEDICAID

## 2024-07-16 VITALS
WEIGHT: 116 LBS | BODY MASS INDEX: 20.55 KG/M2 | OXYGEN SATURATION: 100 % | DIASTOLIC BLOOD PRESSURE: 59 MMHG | HEART RATE: 91 BPM | RESPIRATION RATE: 16 BRPM | HEIGHT: 63 IN | SYSTOLIC BLOOD PRESSURE: 93 MMHG

## 2024-07-16 DIAGNOSIS — R42 DIZZINESS: Primary | ICD-10-CM

## 2024-07-16 DIAGNOSIS — R42 VERTIGO: ICD-10-CM

## 2024-07-16 PROCEDURE — 99204 OFFICE O/P NEW MOD 45 MIN: CPT | Performed by: OTHER

## 2024-07-16 RX ORDER — FOLIC ACID 1 MG/1
1 TABLET ORAL
COMMUNITY
Start: 2024-03-12

## 2024-07-16 RX ORDER — IBUPROFEN 600 MG/1
600 TABLET ORAL EVERY 6 HOURS PRN
COMMUNITY
Start: 2024-06-11

## 2024-07-16 NOTE — PROGRESS NOTES
HPI:    Patient ID: Sabino Manning is a 43 year old female.  PCP: Dr Beck    HPI    Sabino Manning is a 43-year-old female with no significant past medical history presented for evaluation of dizziness.   Patient reports for the past couple years she has been getting episodes of dizziness on and off and headaches.  She reports headache occurs various location,frontal bitemporal or at vertex associated with dizziness and fatigue. Denies any visual changes, light or sound sensitivity.   She reports lately she has been having more dizziness, other day she at parking lot and felt that car is moving even though it was in a park mode. She c/o memory problem, forget simple things on regular basis.   Her PCP had CT Head done which was negative for acute abnormality. She was given Escitalopram for anxiety but was unable to tolerate.         HISTORY:  Past Medical History:    Asthma (HCC)    Hx of tuberculosis    Ulcer      History reviewed. No pertinent surgical history.   Family History   Problem Relation Age of Onset    Heart Disease Father     Breast Cancer Neg     Ovarian Cancer Neg       Social History     Socioeconomic History    Marital status:    Tobacco Use    Smoking status: Never    Smokeless tobacco: Never   Substance and Sexual Activity    Alcohol use: No     Alcohol/week: 0.0 standard drinks of alcohol    Drug use: No    Sexual activity: Yes     Partners: Male   Other Topics Concern    Caffeine Concern Yes     Comment: tea one cup     Social Determinants of Health     Financial Resource Strain: Low Risk  (2/8/2022)    Received from [employer] [facility]    Overall Financial Resource Strain (CARDIA)     Difficulty of Paying Living Expenses: Not hard at all   Food Insecurity: No Food Insecurity (2/8/2022)    Received from [employer] [facility]    Food Insecurity     Within the past 30 days, I worried whether my food would run out  before I got money to buy more. / En los últimos 30 días, me preocupó que la comida se podía acabar antes de tener dinero para compr...: Never true / Nunca     Within the past 30 days, the food that I bought just didn't last, and I didn't have money to get more. / En los últimos 30 días, La comida que compré no rindió lo suficiente, y no tenía dinero para...: Never true / Nunca   Housing Stability: Low Risk  (2/8/2022)    [facility]    Housing Stability Vital Sign     Unable to Pay for Housing in the Last Year: No     Number of Places Lived in the Last Year: 1     Unstable Housing in the Last Year: No        Review of Systems   Constitutional: Negative.    HENT: Negative.     Eyes: Negative.    Respiratory: Negative.     Cardiovascular: Negative.    Gastrointestinal: Negative.    Endocrine: Negative.    Genitourinary: Negative.    Musculoskeletal: Negative.    Skin: Negative.    Allergic/Immunologic: Negative.    Neurological:  Positive for dizziness and headaches.   Hematological: Negative.    Psychiatric/Behavioral: Negative.     All other systems reviewed and are negative.           Current Outpatient Medications   Medication Sig Dispense Refill    Cholecalciferol (VITAMIN D3) 1.25 MG (63552 UT) Oral Cap Take 1 capsule by mouth every 14 (fourteen) days.      ibuprofen 600 MG Oral Tab Take 1 tablet (600 mg total) by mouth every 6 (six) hours as needed.      omeprazole 20 MG Oral Capsule Delayed Release Take 1 capsule (20 mg total) by mouth every morning. 90 capsule 3    albuterol 108 (90 Base) MCG/ACT Inhalation Aero Soln Inhale 2 puffs into the lungs every 4 (four) hours as needed. 18 g 0    Spacer/Aero-Holding Chambers Does not apply Device Use with inhaler as needed 1 each 0    Fluticasone-Salmeterol (AIRDUO RESPICLICK 113/14) 113-14 MCG/ACT Inhalation Aerosol Powder, Breath Activated Inhale 1 puff into the lungs 2 (two) times a day. 1 each 5     SUMAtriptan (IMITREX) 50 MG Oral Tab Take 1 tablet (50 mg total) by mouth every 2 (two) hours as needed for Migraine. Use at onset; repeat once after 2 HRS-ONLY 2 IN 24 HR MAX (Patient not taking: Reported on 2/21/2024) 9 tablet 1     Allergies:  Allergies   Allergen Reactions    Ibuprofen OTHER (SEE COMMENTS)     Stomach discomfort      PHYSICAL EXAM:   Physical Exam  Blood pressure 93/59, pulse 91, resp. rate 16, height 63\", weight 116 lb (52.6 kg), last menstrual period 05/21/2024, SpO2 100%, not currently breastfeeding.    General Appearance: Well nourished, well developed, no apparent distress.   HEENT: Normocephalic and atraumatic. Normal sclera.   Cardiovascular: Normal rate, regular rhythm and normal heart sounds.    Pulmonary/Chest: Effort normal and breath sounds normal.   Abdominal: Soft. Bowel sounds are normal.   Skin: dry, clean and intact  Ext: peripheral pulses present  Psych: normal mood and affect    Neurological:  Patient is awake, alert and oriented to person, place and time   Normal memory, attention/concentration, speech and language.    Cranial Nerves:   II: Visual fields: normal  III: Pupils: equal, round, reactive to light  III,IV,VI: Extra Ocular Movements: intact  V: Facial sensation: intact  VII: Facial strength: intact  VIII: Hearing: intact  IX: Palate: intact  XI: Shoulder shrug: intact  XII: Tongue movement: normal    Motor: Normal tone. Strength is  5 out of 5 in all extremities bilaterally.  DTR: present    Sensory: Sensory examination is normal to light touch and pinprick     Coordination: Finger-to-nose normal bilaterally without evidence of dysmetria.    Gait: Casual, toe, heel, and tandem gait are normal.          TESTS/IMAGING:     CT Head           Impression   CONCLUSION:     No acute intracranial abnormality.     If there are continued headaches, consider MRI of the brain for further evaluation.       ASSESSMENT/PLAN:       ICD-10-CM    1. Dizziness  R42 MRI BRAIN  (CPT=70551)      2. Vertigo  R42 MRI BRAIN (CPT=70551)        Dizziness and vertigo sensation with or without headaches  Likely non specific, peripheral etiology and migrainous dizziness is on the differential      Obtain MRI brain to rule out any intracranial abnormality  Use OTC analgesic as needed for headaches    Patient was hypotensive in the office and advise to hydrate well    I will see her after the MRI is completed    Thank you for allowing us to participate in your patient's care.    Aidan Engel MD  [facility] [place]      This note was prepared using Dragon Medical voice recognition dictation software. As a result errors may occur. When identified these errors have been corrected. While every attempt is made to correct errors during dictation discrepancies may still exist       Meds This Visit:  Requested Prescriptions      No prescriptions requested or ordered in this encounter       Imaging & Referrals:  None     ID#[id number]

## 2024-08-05 ENCOUNTER — OFFICE VISIT (OUTPATIENT)
Dept: FAMILY MEDICINE CLINIC | Facility: CLINIC | Age: 44
End: 2024-08-05

## 2024-08-05 ENCOUNTER — TELEPHONE (OUTPATIENT)
Dept: OBGYN CLINIC | Facility: CLINIC | Age: 44
End: 2024-08-05

## 2024-08-05 VITALS
HEIGHT: 63 IN | SYSTOLIC BLOOD PRESSURE: 93 MMHG | HEART RATE: 88 BPM | WEIGHT: 114 LBS | BODY MASS INDEX: 20.2 KG/M2 | DIASTOLIC BLOOD PRESSURE: 60 MMHG

## 2024-08-05 DIAGNOSIS — Z32.01 PREGNANCY TEST POSITIVE (HCC): ICD-10-CM

## 2024-08-05 DIAGNOSIS — N91.2 AMENORRHEA: Primary | ICD-10-CM

## 2024-08-05 LAB
CONTROL LINE PRESENT WITH A CLEAR BACKGROUND (YES/NO): YES YES/NO
KIT LOT #: NORMAL NUMERIC
PREGNANCY TEST, URINE: POSITIVE

## 2024-08-05 PROCEDURE — 81025 URINE PREGNANCY TEST: CPT | Performed by: FAMILY MEDICINE

## 2024-08-05 PROCEDURE — 99213 OFFICE O/P EST LOW 20 MIN: CPT | Performed by: FAMILY MEDICINE

## 2024-08-05 RX ORDER — PNV NO.95/FERROUS FUM/FOLIC AC 28MG-0.8MG
1 TABLET ORAL DAILY
Qty: 90 TABLET | Refills: 3 | Status: SHIPPED | OUTPATIENT
Start: 2024-08-05 | End: 2024-09-04

## 2024-08-05 NOTE — TELEPHONE ENCOUNTER
Patient just got pos pregnancy test and referred by Dr. Beck.    Lmp ??? Possibly June  Pls advise

## 2024-08-05 NOTE — PROGRESS NOTES
HPI:    Patient ID: Sabion Manning is a 43 year old female.      HPI    Chief Complaint   Patient presents with    Menstrual Problem     No period since May 21 feels stomach heavy not sure if she's pregnant        Wt Readings from Last 6 Encounters:   08/05/24 114 lb (51.7 kg)   07/16/24 116 lb (52.6 kg)   03/11/24 117 lb (53.1 kg)   02/21/24 116 lb (52.6 kg)   02/06/24 117 lb (53.1 kg)   05/22/23 113 lb (51.3 kg)     BP Readings from Last 3 Encounters:   08/05/24 93/60   07/16/24 93/59   03/11/24 93/61     Patient here as she thinks she may have missed her period.  She thinks her last menstrual cycle may have been in June.  Her  lives in [facility] and visits her frequently.  She currently has 3 children, 2 from her current .  Patient states they usually use condoms when she is sexually active.    She has elderly parents that she helps take care of.  She has no good family support    Review of Systems   Constitutional:  Negative for chills and fever.   Gastrointestinal:  Positive for nausea. Negative for abdominal pain, constipation, diarrhea, rectal pain and vomiting.   Genitourinary:  Positive for menstrual problem. Negative for vaginal bleeding, vaginal discharge and vaginal pain.       BP 93/60   Pulse 88   Ht 5' 3\" (1.6 m)   Wt 114 lb (51.7 kg)   LMP 05/21/2024 (Exact Date)   BMI 20.19 kg/m²          Current Outpatient Medications   Medication Sig Dispense Refill    Prenatal Vit-Fe Fumarate-FA (PRENATAL VITAMIN) 27-0.8 MG Oral Tab Take 1 tablet by mouth daily. 90 tablet 3    Cholecalciferol (VITAMIN D3) 1.25 MG (46789 UT) Oral Cap Take 1 capsule by mouth every 14 (fourteen) days. (Patient not taking: Reported on 8/5/2024)      ibuprofen 600 MG Oral Tab Take 1 tablet (600 mg total) by mouth every 6 (six) hours as needed. (Patient not taking: Reported on 8/5/2024)      omeprazole 20 MG Oral Capsule Delayed Release Take 1 capsule (20 mg total) by mouth every morning. (Patient not taking: Reported on  8/5/2024) 90 capsule 3    SUMAtriptan (IMITREX) 50 MG Oral Tab Take 1 tablet (50 mg total) by mouth every 2 (two) hours as needed for Migraine. Use at onset; repeat once after 2 HRS-ONLY 2 IN 24 HR MAX (Patient not taking: Reported on 2/21/2024) 9 tablet 1    albuterol 108 (90 Base) MCG/ACT Inhalation Aero Soln Inhale 2 puffs into the lungs every 4 (four) hours as needed. (Patient not taking: Reported on 8/5/2024) 18 g 0    Spacer/Aero-Holding Chambers Does not apply Device Use with inhaler as needed (Patient not taking: Reported on 8/5/2024) 1 each 0    Fluticasone-Salmeterol (AIRDUO RESPICLICK 113/14) 113-14 MCG/ACT Inhalation Aerosol Powder, Breath Activated Inhale 1 puff into the lungs 2 (two) times a day. (Patient not taking: Reported on 8/5/2024) 1 each 5     Allergies:  Allergies   Allergen Reactions    Ibuprofen OTHER (SEE COMMENTS)     Stomach discomfort       PHYSICAL EXAM:     Chief Complaint   Patient presents with    Menstrual Problem     No period since May 21 feels stomach heavy not sure if she's pregnant       Physical Exam  Vitals reviewed.   Cardiovascular:      Rate and Rhythm: Normal rate and regular rhythm.      Pulses: Normal pulses.      Heart sounds: Normal heart sounds.   Pulmonary:      Effort: Pulmonary effort is normal.      Breath sounds: Normal breath sounds.   Neurological:      Mental Status: She is alert.   Psychiatric:         Attention and Perception: Attention and perception normal.         Mood and Affect: Mood is anxious.         Speech: Speech normal.         Behavior: Behavior normal. Behavior is cooperative.         Thought Content: Thought content normal.         Cognition and Memory: Cognition and memory normal.                ASSESSMENT/PLAN:     Encounter Diagnoses   Name Primary?    Amenorrhea Yes    Pregnancy test positive (HCC)        1. Amenorrhea    - POC Urine pregnancy test [60340]    2. Pregnancy test positive (HCC)  Start prenatal vitamins.  Prescription sent to  pharmacy  - [facility] - INTERNAL      Orders Placed This Encounter   Procedures    POC Urine pregnancy test [23088]         The above note was creating using Dragon speech recognition technology. Please excuse any typos    Meds This Visit:  Requested Prescriptions     Signed Prescriptions Disp Refills    Prenatal Vit-Fe Fumarate-FA (PRENATAL VITAMIN) 27-0.8 MG Oral Tab 90 tablet 3     Sig: Take 1 tablet by mouth daily.       Imaging & Referrals:  [facility] - INTERNAL       ID#[id number]

## 2024-08-05 NOTE — TELEPHONE ENCOUNTER
Patient seen at PCP today, +urine pregnancy test. Patient believes last menstrual period that she remember 5/21/2024, unsure if she had a period. Believes it might have been the 2 nd week of June. But knows she did not have cycle July or August. Patient states cycles were monthly-unsure of how may days apart.     Patient informed of both male and female providers, informed she will need to rotate appointments with all of them since it is provider on-call that will deliver her. Patient agreed with rotation.     Patient is scheduled with Suzy Miller on 8/12 at [facility]. Patient requires early morning, only at [facility]. Declines appointments this week.

## 2024-08-12 ENCOUNTER — OFFICE VISIT (OUTPATIENT)
Dept: OBGYN CLINIC | Facility: CLINIC | Age: 44
End: 2024-08-12

## 2024-08-12 ENCOUNTER — TELEPHONE (OUTPATIENT)
Dept: OBGYN CLINIC | Facility: CLINIC | Age: 44
End: 2024-08-12

## 2024-08-12 VITALS
WEIGHT: 115 LBS | BODY MASS INDEX: 20 KG/M2 | SYSTOLIC BLOOD PRESSURE: 90 MMHG | HEART RATE: 89 BPM | DIASTOLIC BLOOD PRESSURE: 57 MMHG

## 2024-08-12 DIAGNOSIS — Z36.87 UNSURE OF LMP (LAST MENSTRUAL PERIOD) AS REASON FOR ULTRASOUND SCAN (HCC): Primary | ICD-10-CM

## 2024-08-12 DIAGNOSIS — Z32.01 PREGNANCY EXAMINATION OR TEST, POSITIVE RESULT (HCC): ICD-10-CM

## 2024-08-12 PROCEDURE — 76801 OB US < 14 WKS SINGLE FETUS: CPT | Performed by: NURSE PRACTITIONER

## 2024-08-12 PROCEDURE — 99213 OFFICE O/P EST LOW 20 MIN: CPT | Performed by: NURSE PRACTITIONER

## 2024-08-12 NOTE — PROGRESS NOTES
[facility]   Obstetrics and Gynecology    Sabino Manning is a 43 year old female  Patient's last menstrual period was 2024 (approximate).   Chief Complaint   Patient presents with    Gyn Problem     C/O NO PERIODS last menstrual period: UNSURE MAYBE IN    . Last seen in 2018. Unsure last menstrual period. Some nausea. No vomiting  No pain in abdomen, no bleeding.  Got a +pregnancy test on + with primary care provider. Feels stomach is too tight when walking. +possible fetal movement?    Periods every month last 4-5 days. Thinks last period was in   Had intercourse with her  in may- July. He lives in [facility]    Pap:2016 NILM  Contraception: none    OBSTETRICS HISTORY:  OB History    Para Term  AB Living   2 2 2 0 0 2   SAB IAB Ectopic Multiple Live Births   0 0 0 0 2       GYNE HISTORY:  Menarche: 15 (2024  9:53 AM)  Use of Birth Control (if yes, specify type): None (2024  9:53 AM)  Pap Date: 23 (2024  9:53 AM)  Pap Result Notes: PAP/HPV NEG (2024  9:53 AM)  Follow Up Recommendation: MAMMO 24 POSIBLE BENIGN (REPEAT 6 MONTHS) (2024  9:53 AM)      History   Sexual Activity    Sexual activity: Yes    Partners: Male       MEDICAL HISTORY:  Past Medical History:    Asthma (HCC)    Hx of tuberculosis    Ulcer       SOCIAL HISTORY:  Social History     Socioeconomic History    Marital status:      Spouse name: Not on file    Number of children: Not on file    Years of education: Not on file    Highest education level: Not on file   Occupational History    Not on file   Tobacco Use    Smoking status: Never    Smokeless tobacco: Never   Substance and Sexual Activity    Alcohol use: No     Alcohol/week: 0.0 standard drinks of alcohol    Drug use: No    Sexual activity: Yes     Partners: Male   Other Topics Concern     Service Not Asked    Blood Transfusions Not Asked    Caffeine Concern Yes     Comment: tea one cup    Occupational  Exposure Not Asked    Hobby Hazards Not Asked    Sleep Concern Not Asked    Stress Concern Not Asked    Weight Concern Not Asked    Special Diet Not Asked    Back Care Not Asked    Exercise Not Asked    Bike Helmet Not Asked    Seat Belt Not Asked    Self-Exams Not Asked   Social History Narrative    Not on file     Social Determinants of Health     Financial Resource Strain: Low Risk  (2/8/2022)    [facility]    Overall Financial Resource Strain (CARDIA)     Difficulty of Paying Living Expenses: Not hard at all   Food Insecurity: No Food Insecurity (2/8/2022)    [facility]    Food Insecurity     Within the past 30 days, I worried whether my food would run out before I got money to buy more. / En los últimos 30 días, me preocupó que la comida se podía acabar antes de tener dinero para compr...: Never true / Nunca     Within the past 30 days, the food that I bought just didn't last, and I didn't have money to get more. / En los últimos 30 días, La comida que compré no rindió lo suficiente, y no tenía dinero para...: Never true / Nunca   Transportation Needs: Not on file   Physical Activity: Not on file   Stress: Not on file   Social Connections: Not on file   Housing Stability: Low Risk  (2/8/2022)    [facility]    Housing Stability Vital Sign     Unable to Pay for Housing in the Last Year: No     Number of Places Lived in the Last Year: 1     Unstable Housing in the Last Year: No       MEDICATIONS:    Current Outpatient Medications:     Prenatal Vit-Fe Fumarate-FA (PRENATAL VITAMIN) 27-0.8 MG Oral Tab, Take 1 tablet by mouth daily., Disp: 90 tablet, Rfl: 3    Cholecalciferol (VITAMIN D3) 1.25 MG (97414 UT) Oral Cap, Take 1 capsule by mouth every 14 (fourteen) days. (Patient not taking: Reported on 8/5/2024), Disp: ,  Rfl:     albuterol 108 (90 Base) MCG/ACT Inhalation Aero Soln, Inhale 2 puffs into the lungs every 4 (four) hours as needed. (Patient not taking: Reported on 8/12/2024), Disp: 18 g, Rfl: 0    Fluticasone-Salmeterol (AIRDUO RESPICLICK 113/14) 113-14 MCG/ACT Inhalation Aerosol Powder, Breath Activated, Inhale 1 puff into the lungs 2 (two) times a day. (Patient not taking: Reported on 8/5/2024), Disp: 1 each, Rfl: 5    ALLERGIES:    Allergies   Allergen Reactions    Ibuprofen OTHER (SEE COMMENTS)     Stomach discomfort          Review of Systems:  Constitutional:  Denies fatigue, night sweats, hot flashes  Cardiovascular:  denies chest pain or palpitations  Respiratory:  denies shortness of breath  Gastrointestinal:  denies heartburn, abdominal pain, diarrhea or constipation  Genitourinary:  denies dysuria, incontinence, abnormal vaginal discharge, vaginal itching +pregnancy test, unsure dates  Musculoskeletal:  denies back pain.  Skin/Breast:  Denies any breast pain, lumps, or discharge.   Neurological:  denies headaches, extremity weakness or numbness.  Psychiatric: denies depression or anxiety.  Endocrine:   denies excessive thirst or urination.  Heme/Lymph:  denies history of anemia, easy bruising or bleeding.      PHYSICAL EXAM:     Vitals:    08/12/24 0958   BP: 90/57   Pulse: 89   Weight: 115 lb (52.2 kg)     Body mass index is 20.37 kg/m².     Patient offered chaperone, patient declined    Constitutional: well developed, well nourished    Abdomen:  soft, nontender, nondistended, no masses    Psychiatric:  Oriented to time, place, person and situation. Appropriate mood and affect    Transabdominal ultrasound - SLIUP measuring 7w6d to 8w0d, +FHTs 173 bpm. Will send for formal dating US    Assessment & Plan:  Ma was seen today for gyn problem.    Diagnoses and all orders for this visit:    Unsure of LMP (last menstrual period) as reason for ultrasound scan (HCC)    Pregnancy examination or test, positive result  (HCC)    Other orders  -     [facility] AMB OBGYN IM OB 1ST TRIM ABDOMINAL US (26436)      +live IUP on ultrasound in office  Send for formal scan for dates  Continue prenatal vitamin  Set up New OB visit  Reviewed SAB precautions.    PENNY Zaragoza    This note was prepared using Dragon Medical voice recognition dictation software. As a result errors may occur. When identified these errors have been corrected. While every attempt is made to correct errors during dictation discrepancies may still exist.

## 2024-08-12 NOTE — TELEPHONE ENCOUNTER
Per [facility], patient needs to book her Ob Ed nurse appointment asap. Patient is around 8 weeks. I booked for 8/20, wasn't sure is she needs to be seen sooner. Patient also needs [facility]  for appointment.

## 2024-08-20 ENCOUNTER — NURSE ONLY (OUTPATIENT)
Dept: OBGYN CLINIC | Facility: CLINIC | Age: 44
End: 2024-08-20

## 2024-08-20 ENCOUNTER — TELEPHONE (OUTPATIENT)
Dept: OBGYN CLINIC | Facility: CLINIC | Age: 44
End: 2024-08-20

## 2024-08-20 DIAGNOSIS — Z34.81 ENCOUNTER FOR SUPERVISION OF OTHER NORMAL PREGNANCY IN FIRST TRIMESTER (HCC): Primary | ICD-10-CM

## 2024-08-20 NOTE — PROGRESS NOTES
Pt seen for OBN appt today with no complaints. Needed [facility] . Normal PN labs ordered in addition to varicella 1 hr gtt. Pt advised all labs must be completed and resulted prior to NPN appt. If labs are not completed and resulted the NPN appt will be cancelled. Pt informed again of both male and female providers and the need to rotate PN appt with all providers since OB on-call will be the one that delivers her. Assisted pt with scheduling NPN appt with MD.       Height: 5'3\"  Weight:115  BMI: 20.4    Partner's name is Kerry  contact #[phone]; race: [place]  Occupation: passenger assistance    MEDICAL HISTORY    Anemia No    Anesthetic complications No    Anxiety/Depression  No    Autoimmune Disorder No    Asthma  Yes History of   Cancer No    Diabetes  No    Gyne/breast Surgery Yes  ,2018   Heart Disease No    Hepatitis/Liver Disease  No    History of blood transfusion No    History of abnormal pap No    Hypertension  No    Infertility  No    Kidney Disease/Frequent UTIs  No    Medication Allergies Yes Ibuprofen   Latex Allergies No    Food Allergies  No    Neurological Disorder/Epilepsy No    Operations/Hospitalizations Yes    TB exposure  Yes History of TB   Thyroid Dysfunction No    Trauma/Violence  No    Uterine Anomaly  No    Uterine Fibroids  No    Variocosities/DVTs No    Smoker No    Drug usage in prior year No    Alcohol No    Would you accept a blood transfusion?  Yes                INFECTION HISTORY    Chlamydia No    Pt or partner have hx of Genital Herpes No    Gonorrhea No    Hepatitis B No    HIV No    HPV No    MRSA No    Syphilis No    Tattoos No    Live with someone or Exposed to TB Yes Patient had a history of TB   Rash or viral illness since LMP  No    Varicella No Never had chicken pox   Pets No        GENETICS SCREENING    Genetic Screening    No data filed           MISC    Infant vaccinations  Yes

## 2024-08-20 NOTE — TELEPHONE ENCOUNTER
Spoke with patient today patient requesting note for no lifting over 25 lbs for work. Letter sent via [employer].

## 2024-08-22 ENCOUNTER — LAB ENCOUNTER (OUTPATIENT)
Dept: LAB | Age: 44
End: 2024-08-22
Attending: OBSTETRICS & GYNECOLOGY
Payer: MEDICAID

## 2024-08-22 DIAGNOSIS — Z34.81 ENCOUNTER FOR SUPERVISION OF OTHER NORMAL PREGNANCY IN FIRST TRIMESTER (HCC): ICD-10-CM

## 2024-08-22 LAB
ANTIBODY SCREEN: NEGATIVE
BASOPHILS # BLD AUTO: 0.04 X10(3) UL (ref 0–0.2)
BASOPHILS NFR BLD AUTO: 0.6 %
DEPRECATED RDW RBC AUTO: 46.4 FL (ref 35.1–46.3)
EOSINOPHIL # BLD AUTO: 0.11 X10(3) UL (ref 0–0.7)
EOSINOPHIL NFR BLD AUTO: 1.7 %
ERYTHROCYTE [DISTWIDTH] IN BLOOD BY AUTOMATED COUNT: 14.5 % (ref 11–15)
GLUCOSE 1H P GLC SERPL-MCNC: 108 MG/DL
HBV SURFACE AG SER-ACNC: <0.1 [IU]/L
HBV SURFACE AG SERPL QL IA: NONREACTIVE
HCT VFR BLD AUTO: 35.9 %
HCV AB SERPL QL IA: NONREACTIVE
HGB BLD-MCNC: 12.2 G/DL
IMM GRANULOCYTES # BLD AUTO: 0.01 X10(3) UL (ref 0–1)
IMM GRANULOCYTES NFR BLD: 0.2 %
LYMPHOCYTES # BLD AUTO: 1.52 X10(3) UL (ref 1–4)
LYMPHOCYTES NFR BLD AUTO: 23.8 %
MCH RBC QN AUTO: 29.6 PG (ref 26–34)
MCHC RBC AUTO-ENTMCNC: 34 G/DL (ref 31–37)
MCV RBC AUTO: 87.1 FL
MONOCYTES # BLD AUTO: 0.34 X10(3) UL (ref 0.1–1)
MONOCYTES NFR BLD AUTO: 5.3 %
NEUTROPHILS # BLD AUTO: 4.37 X10 (3) UL (ref 1.5–7.7)
NEUTROPHILS # BLD AUTO: 4.37 X10(3) UL (ref 1.5–7.7)
NEUTROPHILS NFR BLD AUTO: 68.4 %
PLATELET # BLD AUTO: 281 10(3)UL (ref 150–450)
RBC # BLD AUTO: 4.12 X10(6)UL
RH BLOOD TYPE: NEGATIVE
RUBV IGG SER QL: POSITIVE
RUBV IGG SER-ACNC: 80 IU/ML (ref 10–?)
T PALLIDUM AB SER QL IA: NONREACTIVE
WBC # BLD AUTO: 6.4 X10(3) UL (ref 4–11)

## 2024-08-22 PROCEDURE — 82950 GLUCOSE TEST: CPT

## 2024-08-22 PROCEDURE — 36415 COLL VENOUS BLD VENIPUNCTURE: CPT

## 2024-08-22 PROCEDURE — 86850 RBC ANTIBODY SCREEN: CPT

## 2024-08-22 PROCEDURE — 86762 RUBELLA ANTIBODY: CPT

## 2024-08-22 PROCEDURE — 86900 BLOOD TYPING SEROLOGIC ABO: CPT

## 2024-08-22 PROCEDURE — 86787 VARICELLA-ZOSTER ANTIBODY: CPT

## 2024-08-22 PROCEDURE — 87340 HEPATITIS B SURFACE AG IA: CPT

## 2024-08-22 PROCEDURE — 85025 COMPLETE CBC W/AUTO DIFF WBC: CPT

## 2024-08-22 PROCEDURE — 86901 BLOOD TYPING SEROLOGIC RH(D): CPT

## 2024-08-22 PROCEDURE — 86780 TREPONEMA PALLIDUM: CPT

## 2024-08-22 PROCEDURE — 87086 URINE CULTURE/COLONY COUNT: CPT

## 2024-08-22 PROCEDURE — 86803 HEPATITIS C AB TEST: CPT

## 2024-08-22 PROCEDURE — 87389 HIV-1 AG W/HIV-1&-2 AB AG IA: CPT

## 2024-08-23 LAB — VZV IGG SER IA-ACNC: 526.3 (ref 165–?)

## 2024-08-29 ENCOUNTER — TELEPHONE (OUTPATIENT)
Dept: OBGYN CLINIC | Facility: CLINIC | Age: 44
End: 2024-08-29

## 2024-08-29 ENCOUNTER — INITIAL PRENATAL (OUTPATIENT)
Dept: OBGYN CLINIC | Facility: CLINIC | Age: 44
End: 2024-08-29

## 2024-08-29 VITALS
SYSTOLIC BLOOD PRESSURE: 86 MMHG | HEART RATE: 87 BPM | BODY MASS INDEX: 21 KG/M2 | DIASTOLIC BLOOD PRESSURE: 52 MMHG | WEIGHT: 115.81 LBS

## 2024-08-29 DIAGNOSIS — Z34.91 ENCOUNTER FOR SUPERVISION OF NORMAL PREGNANCY IN FIRST TRIMESTER, UNSPECIFIED GRAVIDITY (HCC): Primary | ICD-10-CM

## 2024-08-29 LAB
APPEARANCE: CLEAR
BILIRUBIN: NEGATIVE
GLUCOSE (URINE DIPSTICK): NEGATIVE MG/DL
KETONES (URINE DIPSTICK): NEGATIVE MG/DL
LEUKOCYTES: NEGATIVE
MULTISTIX LOT#: NORMAL NUMERIC
NITRITE, URINE: NEGATIVE
OCCULT BLOOD: NEGATIVE
PH, URINE: 6 (ref 4.5–8)
PROTEIN (URINE DIPSTICK): NEGATIVE MG/DL
SPECIFIC GRAVITY: 1.01 (ref 1–1.03)
URINE-COLOR: YELLOW
UROBILINOGEN,SEMI-QN: 0.2 MG/DL (ref 0–1.9)

## 2024-08-29 PROCEDURE — 87591 N.GONORRHOEAE DNA AMP PROB: CPT | Performed by: OBSTETRICS & GYNECOLOGY

## 2024-08-29 PROCEDURE — 87661 TRICHOMONAS VAGINALIS AMPLIF: CPT | Performed by: OBSTETRICS & GYNECOLOGY

## 2024-08-29 PROCEDURE — 87491 CHLMYD TRACH DNA AMP PROBE: CPT | Performed by: OBSTETRICS & GYNECOLOGY

## 2024-08-29 NOTE — PROGRESS NOTES
Called ultrasound to assist for sooner appointment. Fatou with radiology moved patient to 9/3/24 at [facility] at 3pm. Patient notified with  in office.

## 2024-08-29 NOTE — TELEPHONE ENCOUNTER
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 Paper in nurse office in bin.

## 2024-08-29 NOTE — TELEPHONE ENCOUNTER
Patient needs a pn appointment for the week of 09/23, patient advised that there are no open slots for that week. Patient was asked if she wanted to schedule more appointments after 09/23 but she declined, informed that a nurse will contact her to schedule appointment.

## 2024-08-30 ENCOUNTER — TELEPHONE (OUTPATIENT)
Dept: OBGYN CLINIC | Facility: CLINIC | Age: 44
End: 2024-08-30

## 2024-08-30 LAB
C TRACH DNA SPEC QL NAA+PROBE: NEGATIVE
N GONORRHOEA DNA SPEC QL NAA+PROBE: NEGATIVE
T VAGINALIS RRNA SPEC QL NAA+PROBE: NEGATIVE

## 2024-08-30 NOTE — TELEPHONE ENCOUNTER
Form placed on Dr. Arizmendi's desk for completion.  She was seen in office yesterday for prenatal appointment.

## 2024-08-30 NOTE — TELEPHONE ENCOUNTER
Patient informed that forms were placed on Dr. Arizmendi's desk for review. Patient made aware that Dr. Arizmendi will be back in office on  Wednesday 9/4. Patient advised to call back on Wednesday or Thursday to f/up on forms. Patient agreed and verbalized understanding.

## 2024-09-01 ENCOUNTER — TELEPHONE (OUTPATIENT)
Dept: OBGYN CLINIC | Facility: CLINIC | Age: 44
End: 2024-09-01

## 2024-09-01 DIAGNOSIS — O09.521 AMA (ADVANCED MATERNAL AGE) MULTIGRAVIDA 35+, FIRST TRIMESTER (HCC): Primary | ICD-10-CM

## 2024-09-01 PROBLEM — O26.899 RH NEGATIVE, ANTEPARTUM (HCC): Status: ACTIVE | Noted: 2024-09-01

## 2024-09-01 PROBLEM — Z67.91 RH NEGATIVE, ANTEPARTUM (HCC): Status: ACTIVE | Noted: 2024-09-01

## 2024-09-01 PROBLEM — O09.529 ANTEPARTUM MULTIGRAVIDA OF ADVANCED MATERNAL AGE (HCC): Status: ACTIVE | Noted: 2024-09-01

## 2024-09-01 PROBLEM — Z98.891 PREVIOUS CESAREAN SECTION: Status: ACTIVE | Noted: 2024-09-01

## 2024-09-03 ENCOUNTER — TELEMEDICINE (OUTPATIENT)
Dept: NEUROLOGY | Facility: CLINIC | Age: 44
End: 2024-09-03
Payer: MEDICAID

## 2024-09-03 ENCOUNTER — TELEPHONE (OUTPATIENT)
Dept: OBGYN CLINIC | Facility: CLINIC | Age: 44
End: 2024-09-03

## 2024-09-03 ENCOUNTER — HOSPITAL ENCOUNTER (OUTPATIENT)
Dept: ULTRASOUND IMAGING | Facility: HOSPITAL | Age: 44
Discharge: HOME | End: 2024-09-03
Attending: NURSE PRACTITIONER
Payer: MEDICAID

## 2024-09-03 DIAGNOSIS — Z32.01 PREGNANCY EXAMINATION OR TEST, POSITIVE RESULT (HCC): ICD-10-CM

## 2024-09-03 DIAGNOSIS — F45.41 STRESS HEADACHE: ICD-10-CM

## 2024-09-03 DIAGNOSIS — R42 DIZZINESS: Primary | ICD-10-CM

## 2024-09-03 DIAGNOSIS — Z36.87 UNSURE OF LMP (LAST MENSTRUAL PERIOD) AS REASON FOR ULTRASOUND SCAN (HCC): ICD-10-CM

## 2024-09-03 PROCEDURE — 76801 OB US < 14 WKS SINGLE FETUS: CPT | Performed by: NURSE PRACTITIONER

## 2024-09-03 NOTE — TELEPHONE ENCOUNTER
See 8/30 TE. Pt informed that forms were placed on CAPs desk but she is not back in the office until tomorrow, 9/4. Pt stated these forms are important and stated she will follow up again tomorrow to make sure they are completed by CAP. Message to CAP.

## 2024-09-03 NOTE — TELEPHONE ENCOUNTER
First trimester screen,Level 2 and growth have been placed,mychart sent, Maternal Fetal Medicine number provided

## 2024-09-03 NOTE — PROGRESS NOTES
HPI:    Patient ID: Sabino Manning is a 43 year old female.      This visit is conducted using Telemedicine with live, interactive video and audio.    Patient has been referred to the [facility] website at [email or web address] to review the yearly Consent to Treat document.    Patient understands and accepts financial responsibility for any deductible, co-insurance and/or co-pays associated with this service.      HPI  Patient is a 43-year-old female seen for follow-up for dizziness.  She reports after her visit in July she found she is pregnant, 8 weeks approximately. She didn't completed MRI brain due to pregnancy  States some of dizziness may be related to pregnancy but continues to have headaches dull ache over the vertex.  She is trying to eat and drink well      Sabino Manning is a 43-year-old female with no significant past medical history presented for evaluation of dizziness. Patient reports for the past couple years she has been getting episodes of dizziness on and off and headaches.  She reports headache occurs various location,frontal bitemporal or at vertex associated with dizziness and fatigue. Denies any visual changes, light or sound sensitivity.   She reports lately she has been having more dizziness, other day she at parking lot and felt that car is moving even though it was in a park mode. She c/o memory problem, forget simple things on regular basis.   Her PCP had CT Head done which was negative for acute abnormality. She was given Escitalopram for anxiety but was unable to tolerate.         HISTORY:  Past Medical History:    Asthma (HCC)    Hx of tuberculosis    Ulcer      No past surgical history on file.   Family History   Problem Relation Age of Onset    Heart Disease Father     Breast Cancer Neg     Ovarian Cancer Neg       Social History     Socioeconomic History    Marital status:    Tobacco Use    Smoking status: Never    Smokeless tobacco: Never   Substance and Sexual Activity    Alcohol  use: No     Alcohol/week: 0.0 standard drinks of alcohol    Drug use: No    Sexual activity: Yes     Partners: Male   Other Topics Concern    Caffeine Concern Yes     Comment: tea one cup     Social Determinants of Health     Financial Resource Strain: Low Risk  (2/8/2022)    [facility], [facility]    Overall Financial Resource Strain (CARDIA)     Difficulty of Paying Living Expenses: Not hard at all   Food Insecurity: No Food Insecurity (2/8/2022)    [facility] from [facility], [facility]    Food Insecurity     Within the past 30 days, I worried whether my food would run out before I got money to buy more. / En los últimos 30 días, me preocupó que la comida se podía acabar antes de tener dinero para compr...: Never true / Nunca     Within the past 30 days, the food that I bought just didn't last, and I didn't have money to get more. / En los últimos 30 días, La comida que compré no rindió lo suficiente, y no tenía dinero para...: Never true / Nunca   Housing Stability: Low Risk  (2/8/2022)    Received from [facility], [facility]    Housing Stability Vital Sign     Unable to Pay for Housing in the Last Year: No     Number of Places Lived in the Last Year: 1     Unstable Housing in the Last Year: No        Review of Systems   Constitutional: Negative.    HENT: Negative.     Eyes: Negative.    Respiratory: Negative.     Cardiovascular: Negative.    Gastrointestinal: Negative.    Endocrine: Negative.    Genitourinary: Negative.    Musculoskeletal: Negative.    Skin: Negative.    Allergic/Immunologic: Negative.    Neurological:  Positive for dizziness and headaches.   Hematological: Negative.    Psychiatric/Behavioral: Negative.     All other systems reviewed and are negative.           Current Outpatient Medications   Medication Sig Dispense Refill    Prenatal Vit-Fe Fumarate-FA  (PRENATAL VITAMIN) 27-0.8 MG Oral Tab Take 1 tablet by mouth daily. 90 tablet 3    Cholecalciferol (VITAMIN D3) 1.25 MG (60759 UT) Oral Cap Take 1 capsule by mouth every 14 (fourteen) days. (Patient not taking: Reported on 8/5/2024)      albuterol 108 (90 Base) MCG/ACT Inhalation Aero Soln Inhale 2 puffs into the lungs every 4 (four) hours as needed. (Patient not taking: Reported on 8/12/2024) 18 g 0    Fluticasone-Salmeterol (AIRDUO RESPICLICK 113/14) 113-14 MCG/ACT Inhalation Aerosol Powder, Breath Activated Inhale 1 puff into the lungs 2 (two) times a day. (Patient not taking: Reported on 8/5/2024) 1 each 5     Allergies:  Allergies   Allergen Reactions    Ibuprofen OTHER (SEE COMMENTS)     Stomach discomfort      PHYSICAL EXAM:   Physical Exam    Limited due to video visit      General Appearance: well nourished, well developed, no apparent distress.   HEENT: Normocephalic and atraumatic. Normal sclera.   Cardiovascular: not tested  Pulmonary/Chest: Effort normal   Psych: normal mood and affect    Neurological:  Patient is awake, alert and oriented to person, place and time   Normal memory, attention/concentration, speech and language.    Cranial Nerves 2-12: grossly intact    Motor: Normal strength    Sensory: Sensory examination is normal to light touch and pinprick     Coordination: Finger-to-nose normal bilaterally without evidence of dysmetria.    Gait: normal casual gait     TESTS/IMAGING:     CT Head           Impression   CONCLUSION:     No acute intracranial abnormality.     If there are continued headaches, consider MRI of the brain for further evaluation.       ASSESSMENT/PLAN:       ICD-10-CM    1. Dizziness  R42       2. Stress headache  F45.41           Dizziness and vertigo sensation with or without headaches  Likely non specific, peripheral etiology   CT head from March 2024 negative  Has history of hypotension and now pregnant     Continue conservative management  Follow with OB and PCP  Hydrate  well    We will see her as needed. Total time spent on this encounter- 20 minutes          Aidan Engel MD  [facility] [place]      This note was prepared using Dragon Medical voice recognition dictation software. As a result errors may occur. When identified these errors have been corrected. While every attempt is made to correct errors during dictation discrepancies may still exist       Meds This Visit:  Requested Prescriptions      No prescriptions requested or ordered in this encounter       Imaging & Referrals:  None     ID#[id number]

## 2024-09-04 ENCOUNTER — TELEPHONE (OUTPATIENT)
Dept: OBGYN CLINIC | Facility: CLINIC | Age: 44
End: 2024-09-04

## 2024-09-04 NOTE — TELEPHONE ENCOUNTER
Noted. See 9/3 telephone encounter. Dr. Arizmendi to fill out out forms with lifting restrictions.

## 2024-09-04 NOTE — TELEPHONE ENCOUNTER
Spoke to patient. Aware orders listed below have been placed and provided her with Maternal Fetal Medicine number to schedule appointment patient verbalized understandings    Printed lifting restriction sent to her mychart on 8/20 will place at  for patient to . Per patients request

## 2024-09-04 NOTE — TELEPHONE ENCOUNTER
Please take these forms out of my inbox and place on my desk so that I make sure I see it before I leave today, thanks!

## 2024-09-04 NOTE — TELEPHONE ENCOUNTER
HR office is asking for letter , for light duty not to carry luggage , Needs note she is pregnant and light duty ,

## 2024-09-05 RX ORDER — ALBUTEROL SULFATE 90 UG/1
2 AEROSOL, METERED RESPIRATORY (INHALATION) EVERY 4 HOURS PRN
Qty: 54 G | Refills: 0 | Status: SHIPPED | OUTPATIENT
Start: 2024-09-05

## 2024-09-05 NOTE — TELEPHONE ENCOUNTER
Please review; protocol failed/No Protocol    Requested Prescriptions   Pending Prescriptions Disp Refills    ALBUTEROL 108 (90 Base) MCG/ACT Inhalation Aero Soln [Pharmacy Med Name: ALBUTEROL HFA INH (200 PUFFS) 18GM] 18 g 0     Sig: INHALE 2 PUFFS BY MOUTH INTO THE LUNGS EVERY 4 HOURS AS NEEDED       Asthma & COPD Medication Protocol Failed - 9/3/2024 12:09 PM        Failed - Asthma Action Score greater than or equal to 20        Failed - AAP/ACT given in last 12 months     No data recorded  No data recorded  No data recorded  No data recorded          Passed - Appointment in past 6 or next 3 months      Recent Outpatient Visits              2 days ago Dizziness    [facility] Aidan Engel MD    Telemedicine    1 week ago Encounter for supervision of normal pregnancy in first trimester, unspecified  ([facility])    [facility] - OB/GYN Alis Arizmendi MD    Initial Prenatal    2 weeks ago Encounter for supervision of other normal pregnancy in first trimester ([facility])    [facility] - OB/[facility]    Nurse Only    3 weeks ago Unsure of LMP (last menstrual period) as reason for ultrasound scan ([facility])    [facility] OB/GYN Suzy Miller APRN    Office Visit    1 month ago Amenorrhea    [facility] Loni Beck MD    Office Visit          Future Appointments         Provider Department Appt Notes    In 4 days [address] Maternal Fetal Medicine CONSULT/[id number]/AMA    In 3 weeks Ike Garcia DO [facility] - OB/GYN PN- needs [place]ander intepreter                       Future Appointments         Provider Department Appt Notes    In 4 days [address] Maternal Fetal Medicine CONSULT/[id number]/AMA    In 3 weeks Ike Garcia DO  [facility] - [facility] PN- needs [place] intepreter          Recent Outpatient Visits              2 days ago Dizziness    [facility] Aidan Engel MD    Telemedicine    1 week ago Encounter for supervision of normal pregnancy in first trimester, unspecified  ([facility])    [facility] - OB/GYN Alis Arizmendi MD    Initial Prenatal    2 weeks ago Encounter for supervision of other normal pregnancy in first trimester ([facility])    [facility] - OB/GYN    Nurse Only    3 weeks ago Unsure of LMP (last menstrual period) as reason for ultrasound scan ([facility])    [facility] - OB/GYN Suzy Miller APRN    Office Visit    1 month ago Amenorrhea    [facility]    Office Visit

## 2024-09-05 NOTE — TELEPHONE ENCOUNTER
Patient stopped by the  to have form compleated, form was signed by Dr. Arizmendi and a 25lbs weight lifting restriction was signed. Patient given completed form.

## 2024-09-06 ENCOUNTER — TELEPHONE (OUTPATIENT)
Dept: OBGYN CLINIC | Facility: CLINIC | Age: 44
End: 2024-09-06

## 2024-09-06 NOTE — TELEPHONE ENCOUNTER
12w5d  See patient request below.   To Dr. Bhakta to advise if ok for note?    EOAE (evoked otoacoustic emission)

## 2024-09-06 NOTE — TELEPHONE ENCOUNTER
Patient called in [facility] in [place] need a note stating she cannot stand for a long time, need to take a break every two hours. Patient works at [employer] as a .  Request a nurse to call to advise.   Patient can  at the [place] location

## 2024-09-09 ENCOUNTER — HOSPITAL ENCOUNTER (OUTPATIENT)
Dept: PERINATAL CARE | Facility: HOSPITAL | Age: 44
End: 2024-09-09
Attending: OBSTETRICS & GYNECOLOGY
Payer: MEDICAID

## 2024-09-09 ENCOUNTER — HOSPITAL ENCOUNTER (OUTPATIENT)
Dept: PERINATAL CARE | Facility: HOSPITAL | Age: 44
Discharge: HOME | End: 2024-09-09
Attending: OBSTETRICS & GYNECOLOGY
Payer: MEDICAID

## 2024-09-09 VITALS
WEIGHT: 119 LBS | BODY MASS INDEX: 21 KG/M2 | HEART RATE: 97 BPM | SYSTOLIC BLOOD PRESSURE: 95 MMHG | DIASTOLIC BLOOD PRESSURE: 60 MMHG

## 2024-09-09 DIAGNOSIS — O09.529 ANTEPARTUM MULTIGRAVIDA OF ADVANCED MATERNAL AGE (HCC): ICD-10-CM

## 2024-09-09 DIAGNOSIS — Z60.3 LANGUAGE BARRIER: ICD-10-CM

## 2024-09-09 DIAGNOSIS — Z75.8 LANGUAGE BARRIER: ICD-10-CM

## 2024-09-09 DIAGNOSIS — O09.521 MULTIGRAVIDA OF ADVANCED MATERNAL AGE IN FIRST TRIMESTER (HCC): Primary | ICD-10-CM

## 2024-09-09 DIAGNOSIS — Z36.9 FIRST TRIMESTER SCREENING (HCC): ICD-10-CM

## 2024-09-09 DIAGNOSIS — O09.521 AMA (ADVANCED MATERNAL AGE) MULTIGRAVIDA 35+, FIRST TRIMESTER (HCC): ICD-10-CM

## 2024-09-09 PROCEDURE — 76801 OB US < 14 WKS SINGLE FETUS: CPT | Performed by: OBSTETRICS & GYNECOLOGY

## 2024-09-09 SDOH — SOCIAL STABILITY - SOCIAL INSECURITY: ACCULTURATION DIFFICULTY: Z60.3

## 2024-09-09 NOTE — PROGRESS NOTES
Outpatient Maternal-Fetal Medicine Consultation    Dear Dr. Arizmendi    Thank you for requesting ultrasound evaluation and maternal fetal medicine consultation on your patient Sabino Manning.  As you are aware she is a 43 year old female  with a han pregnancy and an Estimated Date of Delivery: 3/16/25.  A maternal-fetal medicine consultation was requested secondary to Advanced Maternal Age.  Her prenatal records and labs were reviewed.    2 prior cesareans.  She is concerned about work.    ROS    HISTORY  OB History    Para Term  AB Living   3 2 2 0 0 2   SAB IAB Ectopic Multiple Live Births   0 0 0 0 2      # Outcome Date GA Lbr Almas/2nd Weight Sex Type Anes PTL Lv   3 Current            2 Term 18 40w0d   F Caesarean   DANNY   1 Term 17 40w2d 54:30 / 06:50 8 lb 3.4 oz (3.725 kg) M CS-LTranv EPI N DANNY             Allergies:  Allergies   Allergen Reactions    Ibuprofen OTHER (SEE COMMENTS)     Stomach discomfort       Current Meds:  Current Outpatient Medications   Medication Sig Dispense Refill    albuterol 108 (90 Base) MCG/ACT Inhalation Aero Soln Inhale 2 puffs into the lungs every 4 (four) hours as needed. 54 g 0    Cholecalciferol (VITAMIN D3) 1.25 MG (37635 UT) Oral Cap Take 1 capsule by mouth every 14 (fourteen) days. (Patient not taking: Reported on 2024)      Fluticasone-Salmeterol (AIRDUO RESPICLICK 113/14) 113-14 MCG/ACT Inhalation Aerosol Powder, Breath Activated Inhale 1 puff into the lungs 2 (two) times a day. (Patient not taking: Reported on 2024) 1 each 5        HISTORY:  Past Medical History:    Asthma (HCC)    Hx of tuberculosis    Ulcer      No past surgical history on file.   Family History   Problem Relation Age of Onset    Heart Disease Father     Breast Cancer Neg     Ovarian Cancer Neg       Social History     Socioeconomic History    Marital status:    Tobacco Use    Smoking status: Never    Smokeless tobacco: Never   Substance and Sexual Activity     Alcohol use: No     Alcohol/week: 0.0 standard drinks of alcohol    Drug use: No    Sexual activity: Yes     Partners: Male   Other Topics Concern    Caffeine Concern Yes     Comment: tea one cup     Social Determinants of Health     Financial Resource Strain: Low Risk  (2/8/2022)    [facility], [facility]    Overall Financial Resource Strain (CARDIA)     Difficulty of Paying Living Expenses: Not hard at all   Food Insecurity: No Food Insecurity (2/8/2022)    [facility], [facility]    Food Insecurity     Within the past 30 days, I worried whether my food would run out before I got money to buy more. / En los últimos 30 días, me preocupó que la comida se podía acabar antes de tener dinero para compr...: Never true / Nunca     Within the past 30 days, the food that I bought just didn't last, and I didn't have money to get more. / En los últimos 30 días, La comida que compré no rindió lo suficiente, y no tenía dinero para...: Never true / Nunca   Housing Stability: Low Risk  (2/8/2022)    [facility]    Housing Stability Vital Sign     Unable to Pay for Housing in the Last Year: No     Number of Places Lived in the Last Year: 1     Unstable Housing in the Last Year: No          PHYSICAL EXAMINATION:  BP 95/60   Pulse 97   Wt 119 lb (54 kg)   LMP 06/12/2024 (Approximate)   BMI 21.08 kg/m²   Physical Exam  Constitutional:       Appearance: Normal appearance.   Abdominal:      Palpations: Abdomen is soft.      Tenderness: There is no abdominal tenderness.   Neurological:      Mental Status: She is alert.   Psychiatric:         Mood and Affect: Mood normal.         Behavior: Behavior normal.           OBSTETRIC ULTRASOUND  The patient had a first trimester ultrasound today which revealed normal first trimester anatomy with size consistent  with dates.  The NT measurement was: 1 mm; this is within normal limits.  The nasal bone is present.  Single IUP with cardiac activity 155 bpm  Amniotic fluid is normal.  Placenta location is anterior  The CRL is consistent with gestational age. Nasal bone present. The nuchal translucency measures 1 mm. This is within normal limits.   The maternal uterus and ovaries appear normal.  See PACS/Imaging Tab For Complete Ultrasound Report  I interpreted the results and reviewed them with the patient.    DISCUSSION  During her visit we discussed and reviewed the following issues:  ADVANCED MATERNAL AGE    Background  I reviewed with the patient that pregnancies in women of advanced maternal age (35 or older at delivery) are associated with elevated risks. Specifically, there is a higher rate of:  Fetal malformations  Preeclampsia  Gestational diabetes  Intrauterine fetal death    As a result, enhanced pregnancy surveillance is advised for these patients including a comprehensive ultrasound to assess for fetal malformations (at 20 weeks) and a third trimester ultrasound assessment for fetal growth (at 32 weeks). In addition, weekly NST's (initiating at 36 weeks gestation for women 35-39 years and at 32 weeks gestation for women 40 years and older) are also advised. Routine obstetric care is more than adequate to assess for gestational diabetes and preeclampsia; hence, no further significant alterations in obstetric care are advised.    Medical Complications    Women 35 years of age or older can expect to experience two to three fold higher rates of hospitalization,  delivery, and pregnancy-related complications when compared to their younger counterparts.  The two most common medical problems complicating these  pregnanccies are hypertension and diabetes.   The incidence of preeclampsia in the general obstetric population is 3 to 4 percent; this increases to 5 to 10 percent in women over age 40 and is as high as 35  percent in women over age 50.   The incidence of gestational diabetes in the general obstetric population is 3 percent, rising to 7 to 12 percent in women over age 40 and 20 percent in women over age 50.  Women 35 years of age or older are more likely to be delivered by . The  delivery rate in the general obstetric population of the [place] is almost 30 percent, compared to almost 50 percent in women over age 40 to 45 and almost 80 percent in women age 50 to 63.          Fetal Death        A decision analysis tool using data from the [place] [employer]  Database predicted a strategy of weekly antepartum testing and labor induction would lower the risk of unexplained fetal death in women 35 years of age or older. In this model, weekly testing starting at 36 weeks of gestation would drop the risk of fetal death from 5.2 to 1.3 per 1000 pregnancies. While a policy of antepartum testing in older women does increase the chance that a women will be induced (71 inductions per fetal death averted) and thereby increases her risk of having a  delivery, only 14 additional cesareans would need to be performed to avert one unexplained fetal death.  Hence, weekly NST's are advised for women of advanced maternal age; testing should be initiated at 36 weeks for women 35-39 years and at 32 weeks for women 40 years and older.    Fetal Malformations    Cardiac malformations, clubfoot, and diaphragmatic hernia appear to occur with increased frequency in offspring of older women. These abnormalities are structural and unrelated to aneuploidy, thus they would not be detected by karyotype analysis.  For these reasons a complete, detailed ultrasound (level II) is advised even if the fetus has a normal karyotype.      Fetal Aneuploidy      Invasive Testing  I offered invasive genetic testing (amniocentesis, chorionic villus sampling) after reviewing the diagnostic accuracy of these tests as well as the  procedure associated loss rate (1:500 for genetic amniocentesis).    She ultimately does not desire invasive genetic testing.     We discussed  the increased risk of chromosomal abnormalities associated with advanced maternal age at age  44 year old. She understands that ultrasound exam cannot exclude potential genetic abnormalities.  Her estimated risk based on maternal age at term with any chromosome abnormality is about 1: 38 and with Down Syndrome is about 1: 54.   We also discussed the risks and benefits of having  genetic testing ([facility] and amniocentesis) performed.      Non-invasive Pregnancy Testing (NIPT)  I reviewed current non-invasive screening options. Currently non-invasive pregnancy testing (NIPT) offers the highest detection rate (with the lowest false positive rate) for the detection of fetal aneuploidy amongst high-risk patients. The limitations of detailed mid-trimester sonography was reviewed with the patient. First trimester screening and second trimester multiple-marker serum serum screening as alternative aneuploidy screening options were also reviewed. However, both of these tests are associated with lower detection and higher false positive rates.      Work accommodations can be discussed with her OB        [employer]  [id number] was used    IMPRESSION:  IUP at 13w1d   AMA Desires NIPT screen.  Declines aneuploidy screening  Prior  x 2    RECOMMENDATIONS:  Continue care with Dr. Arizmendi  Level II at 20 weeks.  Follow-up Growth ultrasound with BPP at 32 weeks.  Weekly NST's at 32 weeks.  Twice weekly testing at 38 weeks - weekly NST and weekly BPP.  Delivery at 39-40 weeks.  NIPT screen drawn          Thank you for allowing me to participate in the care of your patient.  Please do not hesitate to contact me if additional questions or concerns arise.      Massiel Roberts M.D.    40 minutes spent in review of records, patient consultation, documentation and coordination of care.  The  relevant clinical matter(s) are summarized above.     Note to patient and family  The 21st Century Cures Act makes medical notes available to patients in the interest of transparency.  However, please be advised that this is a medical document.  It is intended as [id number] communication.  It is written and medical language may contain abbreviations or verbiage that are technical and unfamiliar.  It may appear blunt or direct.  Medical documents are intended to carry relevant information, facts as evident, and the clinical opinion of the practitioner.

## 2024-09-09 NOTE — TELEPHONE ENCOUNTER
13w1d.  Patient requesting a note that states she cannot stand for long periods of time and needs to take a break every 2 hours.  She works as a  for [employer].  Spoke with patient.  States she stands for her entire shift.  Patient is requesting a note that gives her breaks every 2 hours to sit, eat and drink.  Patient states she is having some back pain and that her \"stomach gets tight\" when she stands for long periods.  Patient also states she urinated frequent and needs bathroom breaks.  Patient denied any burning with urination.  States she drinks a lot of water.  Patient also requesting note that states she cannot work 8 hour shifts, but can only work 5 hour shifts.  Patient advised message will be sent to provider but if there is no medical reason for these requests letter may not be written.  Message to on call provider.

## 2024-09-09 NOTE — ADDENDUM NOTE
Encounter addended by: Massiel Roberts MD on: 9/9/2024 3:06 PM   Actions taken: Charge Capture section accepted

## 2024-09-09 NOTE — TELEPHONE ENCOUNTER
Patient still waiting for note for her work.  Patient would like to pickup at [place] location.    Patient asking for ASAP.

## 2024-09-09 NOTE — TELEPHONE ENCOUNTER
Notified patient of Dr. Yusuf message below. Patient accepts note and requesting to be sent via [employer] and for  at . Informed patient nurse will do both.

## 2024-09-16 ENCOUNTER — TELEPHONE (OUTPATIENT)
Dept: PERINATAL CARE | Facility: HOSPITAL | Age: 44
End: 2024-09-16

## 2024-09-16 NOTE — TELEPHONE ENCOUNTER
[place] screening results  Reviewed by MFM DR Roberts    Low Risk for Aneuploidies, triploidy and Monosomy X  Fetal Sex: Male      My chart message sent  Copy of results sent for scanning into pt record

## 2024-09-26 ENCOUNTER — ROUTINE PRENATAL (OUTPATIENT)
Dept: OBGYN CLINIC | Facility: CLINIC | Age: 44
End: 2024-09-26

## 2024-09-26 VITALS
WEIGHT: 120 LBS | BODY MASS INDEX: 21 KG/M2 | DIASTOLIC BLOOD PRESSURE: 52 MMHG | HEART RATE: 86 BPM | SYSTOLIC BLOOD PRESSURE: 87 MMHG

## 2024-09-26 DIAGNOSIS — Z34.91 ENCOUNTER FOR SUPERVISION OF NORMAL PREGNANCY IN FIRST TRIMESTER, UNSPECIFIED GRAVIDITY (HCC): Primary | ICD-10-CM

## 2024-09-26 LAB
APPEARANCE: CLEAR
BILIRUBIN: NEGATIVE
GLUCOSE (URINE DIPSTICK): NEGATIVE MG/DL
KETONES (URINE DIPSTICK): NEGATIVE MG/DL
LEUKOCYTES: NEGATIVE
MULTISTIX LOT#: NORMAL NUMERIC
NITRITE, URINE: NEGATIVE
OCCULT BLOOD: NEGATIVE
PH, URINE: 7 (ref 4.5–8)
PROTEIN (URINE DIPSTICK): NEGATIVE MG/DL
SPECIFIC GRAVITY: 1 (ref 1–1.03)
URINE-COLOR: YELLOW
UROBILINOGEN,SEMI-QN: 0.2 MG/DL (ref 0–1.9)

## 2024-09-26 PROCEDURE — 81002 URINALYSIS NONAUTO W/O SCOPE: CPT | Performed by: OBSTETRICS & GYNECOLOGY

## 2024-09-26 PROCEDURE — 99213 OFFICE O/P EST LOW 20 MIN: CPT | Performed by: OBSTETRICS & GYNECOLOGY

## 2024-09-26 RX ORDER — PRENATAL VIT/IRON FUM/FOLIC AC 27MG-0.8MG
1 TABLET ORAL DAILY
COMMUNITY
Start: 2024-09-03

## 2024-09-26 NOTE — PROGRESS NOTES
[facility] [id number] for interpretation. No cramping/vaginal bleeding.  Low risk genetics.  MFM level 2 scheduled.  RTC 4 wks.

## 2024-10-14 ENCOUNTER — TELEPHONE (OUTPATIENT)
Dept: OBGYN CLINIC | Facility: CLINIC | Age: 44
End: 2024-10-14

## 2024-10-14 ENCOUNTER — TELEPHONE (OUTPATIENT)
Dept: PERINATAL CARE | Facility: HOSPITAL | Age: 44
End: 2024-10-14

## 2024-10-14 DIAGNOSIS — O09.529 ANTEPARTUM MULTIGRAVIDA OF ADVANCED MATERNAL AGE (HCC): ICD-10-CM

## 2024-10-23 ENCOUNTER — ROUTINE PRENATAL (OUTPATIENT)
Dept: OBGYN CLINIC | Facility: CLINIC | Age: 44
End: 2024-10-23
Payer: MEDICAID

## 2024-10-23 VITALS
DIASTOLIC BLOOD PRESSURE: 55 MMHG | SYSTOLIC BLOOD PRESSURE: 88 MMHG | WEIGHT: 120 LBS | BODY MASS INDEX: 21 KG/M2 | HEART RATE: 99 BPM

## 2024-10-23 DIAGNOSIS — O09.529 ANTEPARTUM MULTIGRAVIDA OF ADVANCED MATERNAL AGE (HCC): ICD-10-CM

## 2024-10-23 DIAGNOSIS — Z34.92 ENCOUNTER FOR SUPERVISION OF NORMAL PREGNANCY IN SECOND TRIMESTER, UNSPECIFIED GRAVIDITY (HCC): Primary | ICD-10-CM

## 2024-10-23 PROBLEM — E55.9 VITAMIN D DEFICIENCY: Status: RESOLVED | Noted: 2017-06-11 | Resolved: 2024-10-23

## 2024-10-23 PROBLEM — D70.9 NEUTROPENIA, UNSPECIFIED: Status: RESOLVED | Noted: 2022-06-08 | Resolved: 2024-10-23

## 2024-10-23 PROBLEM — G43.C0 PERIODIC HEADACHE SYNDROME, NOT INTRACTABLE: Status: RESOLVED | Noted: 2024-02-06 | Resolved: 2024-10-23

## 2024-10-23 PROBLEM — O09.891 SHORT INTERVAL BETWEEN PREGNANCIES AFFECTING PREGNANCY IN FIRST TRIMESTER, ANTEPARTUM (HCC): Status: RESOLVED | Noted: 2017-10-19 | Resolved: 2024-10-23

## 2024-10-23 PROBLEM — D70.9 NEUTROPENIA, UNSPECIFIED (HCC): Status: RESOLVED | Noted: 2022-06-08 | Resolved: 2024-10-23

## 2024-10-23 PROBLEM — Z98.891 S/P CESAREAN SECTION: Status: RESOLVED | Noted: 2017-04-09 | Resolved: 2024-10-23

## 2024-10-23 PROBLEM — J45.909 REACTIVE AIRWAY DISEASE (HCC): Status: RESOLVED | Noted: 2022-01-06 | Resolved: 2024-10-23

## 2024-10-23 LAB
BILIRUBIN: NEGATIVE
GLUCOSE (URINE DIPSTICK): NEGATIVE MG/DL
KETONES (URINE DIPSTICK): NEGATIVE MG/DL
LEUKOCYTES: NEGATIVE
MULTISTIX LOT#: 57 NUMERIC
NITRITE, URINE: NEGATIVE
OCCULT BLOOD: NEGATIVE
PH, URINE: 6 (ref 4.5–8)
PROTEIN (URINE DIPSTICK): NEGATIVE MG/DL
SPECIFIC GRAVITY: 1.02 (ref 1–1.03)
UROBILINOGEN,SEMI-QN: 0.2 MG/DL (ref 0–1.9)

## 2024-10-23 PROCEDURE — 90471 IMMUNIZATION ADMIN: CPT | Performed by: OBSTETRICS & GYNECOLOGY

## 2024-10-23 PROCEDURE — 81002 URINALYSIS NONAUTO W/O SCOPE: CPT | Performed by: OBSTETRICS & GYNECOLOGY

## 2024-10-23 PROCEDURE — 99213 OFFICE O/P EST LOW 20 MIN: CPT | Performed by: OBSTETRICS & GYNECOLOGY

## 2024-10-23 PROCEDURE — 90656 IIV3 VACC NO PRSV 0.5 ML IM: CPT | Performed by: OBSTETRICS & GYNECOLOGY

## 2024-10-23 NOTE — PROGRESS NOTES
Pt 19w3d here of routine PN visit requesting Flu vaccine. Pt verified, risk and benefits discussed and all questions answered to pts satisfaction. Written consent obtained. 5 rights of medication administration performed. Flu given IM in the left deltoid, pt tolerated injection well. Pt given VIS sheet and discharged via ambulatory with belongings.

## 2024-10-23 NOTE — PROGRESS NOTES
Greek language line.  Wants flu shot.  Discussed Tdap and RSV.  RTC 4 week  See Prenatal Vitals flowsheet for documentation of vitals / urine / exam. See problem list -- updated in detail.   Used 20-29 min in order to do detailed chart review & review of blood work / ultrasound reports / MFM reports, reviewing history, performing prenatal exam / pelvic exam, counseling pt on vaccines, prenatal education, ordering tests or meds, ordering MFM /specialist referrals, documentation in EMR, interpretation / communication of results & care coordination

## 2024-10-31 ENCOUNTER — HOSPITAL ENCOUNTER (OUTPATIENT)
Dept: PERINATAL CARE | Facility: HOSPITAL | Age: 44
Discharge: HOME | End: 2024-10-31
Attending: OBSTETRICS & GYNECOLOGY
Payer: MEDICAID

## 2024-10-31 VITALS — DIASTOLIC BLOOD PRESSURE: 39 MMHG | BODY MASS INDEX: 21 KG/M2 | SYSTOLIC BLOOD PRESSURE: 76 MMHG | WEIGHT: 120 LBS

## 2024-10-31 DIAGNOSIS — O43.192 MARGINAL INSERTION OF UMBILICAL CORD AFFECTING MANAGEMENT OF MOTHER IN SECOND TRIMESTER (HCC): ICD-10-CM

## 2024-10-31 DIAGNOSIS — O09.529 ANTEPARTUM MULTIGRAVIDA OF ADVANCED MATERNAL AGE (HCC): ICD-10-CM

## 2024-10-31 DIAGNOSIS — O09.529 ANTEPARTUM MULTIGRAVIDA OF ADVANCED MATERNAL AGE (HCC): Primary | ICD-10-CM

## 2024-10-31 DIAGNOSIS — Z36.89 ENCOUNTER FOR FETAL ANATOMIC SURVEY (HCC): ICD-10-CM

## 2024-10-31 DIAGNOSIS — Z98.891 PREVIOUS CESAREAN SECTION: ICD-10-CM

## 2024-10-31 PROCEDURE — 76811 OB US DETAILED SNGL FETUS: CPT | Performed by: OBSTETRICS & GYNECOLOGY

## 2024-10-31 NOTE — PROGRESS NOTES
Outpatient Maternal-Fetal Medicine Follow-Up     Dear Dr. Arizmendi,     Thank you for requesting ultrasound evaluation and maternal fetal medicine consultation on your patient Sabino Manning.  As you are aware she is a 43/42 year old female  with a Lopez pregnancy and an Estimated Date of Delivery: 3/16/25.  She returned to maternal-fetal medicine today for a follow-up visit.  Her history was reviewed from her prior visit and there were no interval changes.     Antepartum Risk Factors  AMA, low risk cfDNA screen  H/o prior  x 2    S: Feeling FM.  No concerns     PHYSICAL EXAMINATION:  Wt 120 lb (54.4 kg)   LMP 2024 (Approximate)   BMI 21.26 kg/m²   General: alert and oriented, no acute distress  Abdomen: gravid, soft, non-tender  Extremities: non-tender, no edema     OBSTETRIC ULTRASOUND  The patient had a level 2 ultrasound today which I interpreted the results and reviewed them with the patient.    Ultrasound Findings:  Single IUP in breech presentation.    Placenta is anterior.   A 3 vessel cord is noted.  Cardiac activity is present at 146 bpm   g ( 0 lb 14 oz);   MVP is 6.3 cm .     The fetal measurements are consistent with the established EDC. No ultrasound evidence of structural abnormalities are seen today. The nasal bone is present. No ultrasound evidence of markers for aneuploidy are seen. She understands that ultrasound exam cannot exclude genetic abnormalities and that genetic testing is recommended. The limitations of ultrasound were discussed.     Uterus and adnexa appeared normal today on US    See imaging tab for the complete US report.     DISCUSSION  During her visit we discussed and reviewed the following issues:  ADVANCED MATERNAL AGE - discussed previously and reviewed. See MFM note from 24 for detailed review.    I reviewed with the patient that pregnancies in women of advanced maternal age (35 or older at delivery) are associated with elevated risks.  Specifically, there is a higher rate of:  Fetal malformations  Preeclampsia  Gestational diabetes  Intrauterine fetal death     As a result, enhanced pregnancy surveillance is advised for these patients including a comprehensive ultrasound to assess for fetal malformations (at 20 weeks) and a third trimester ultrasound assessment for fetal growth (at 32 weeks). In addition, weekly NST's (initiating at 36 weeks gestation for women 35-39 years and at 32 weeks gestation for women 40 years and older) are also advised. Routine obstetric care is more than adequate to assess for gestational diabetes and preeclampsia; hence, no further significant alterations in obstetric care are advised.          Fetal Aneuploidy  We reviewed her low risk cell free DNA screen and the role of the level 2 ultrasound.    We discussed her low risk cell free DNA screen and her normal level II ultrasound today.  We discussed her option for amniocentesis but that the likelihood of finding a genetic concern is quite low after her other low risk evaluations.  She appropriately declined invasive prenatal genetic diagnostic testing.    MARGINAL PLACENTAL UMBILICAL CORD INSERTION  The umbilical cord is marginally inserted into the placenta.  This may be associated with a higher risk for fetal growth restriction.  As a result, a follow-up growth ultrasound is advised at 32 weeks to re-evaluate interval fetal growth.         We discussed the recommended plan of care based on her  risk factors.  Ma  and her significant other, Blue, had their questions answered to their satisfaction.       Hiri  [id number] was used     IMPRESSION:  IUP at 20w4d   Normal level 2 ultrasound  Marginal placental cord insertion  AMA  - low risk cfDNA screen.  Declines aneuploidy testing  Prior  x 2     RECOMMENDATIONS:  Continue care with Dr. Arizmendi  Follow-up Growth ultrasound with BPP at 32 weeks.  Weekly NST's at 32 weeks.  Twice weekly testing at  38 weeks - weekly NST and weekly BPP  Delivery at 39-40 weeks.     Total time spent 30 minutes this calendar day which includes preparing to see the patient including chart review, obtaining and/or reviewing additional medical history, performing a physical exam and evaluation, documenting clinical information in the electronic medical record, independently interpreting results, counseling the patient, communicating results to the patient/family/caregiver and coordinating care.     Case discussed with patient who demonstrated understanding and agreement with plan.     Thank you for allowing me to participate in the care of this patient.  Please feel free to contact me with any questions.    Diamond Cárdenas MD  Maternal-Fetal Medicine       Note to patient and family:  The 21st Century Cures Act makes medical notes available to patients in the interest of transparency.  However, please be advised that this is a medical document.  It is intended as a peer to peer communication.  It is written in medical language and may contain abbreviations or verbiage that are technical and unfamiliar.  It may appear blunt or direct.  Medical documents are intended to carry relevant information, facts as evident, and the clinical opinion of the practitioner.

## 2024-10-31 NOTE — PROGRESS NOTES
Infant progressing well throughout the night. Sats maintained in the 90s and able to wean from 4L at 28% to 2.5L at 21% at this time.  Infant does not tolerate activity well. She had a period of crying during labs and afterwards became dusky and minimal shallow breaths noted. Infant sats dropped to 44%. She was given CPAP, increased to 100% FiO2 d/t slow recovery. After recovery infant was very easily and quickly able to wean back to the 2.5L at 21%. Will continue to monitor closely.    Pt for level 2 US  HX neg NIPT  Denies pregnancy complaints  States active fetus

## 2024-11-06 RX ORDER — ALBUTEROL SULFATE 90 UG/1
2 INHALANT RESPIRATORY (INHALATION) EVERY 4 HOURS PRN
Qty: 54 G | Refills: 3 | Status: SHIPPED | OUTPATIENT
Start: 2024-11-06

## 2024-11-06 NOTE — TELEPHONE ENCOUNTER
Please review; protocol failed/No Protocol    Requested Prescriptions   Pending Prescriptions Disp Refills    ALBUTEROL 108 (90 Base) MCG/ACT Inhalation Aero Soln [Pharmacy Med Name: ALBUTEROL HFA INH (200 PUFFS) 18GM] 54 g 0     Sig: INHALE 2 PUFFS INTO THE LUNGS EVERY 4 HOURS AS NEEDED       Asthma & COPD Medication Protocol Failed - 2024  9:28 AM        Failed - ACT Score greater than or equal to 20                Failed - ACT recorded in the last 12 months                Passed - Appointment in past 6 or next 3 months      Recent Outpatient Visits              2 weeks ago Encounter for supervision of normal pregnancy in second trimester, unspecified  ([facility])    [facility], [place] - OB/GYN Jenna Yusuf MD    Routine Prenatal    1 month ago Encounter for supervision of normal pregnancy in first trimester, unspecified  ([facility])    [facility] [place] - OB/GYN Ike Garcia DO    Routine Prenatal    2 months ago Dizziness    [facility] Aidan Engel MD    Telemedicine    2 months ago Encounter for supervision of normal pregnancy in first trimester, unspecified  ([facility])    [facility] - OB/GYN Alis Arizmendi MD    Initial Prenatal    2 months ago Encounter for supervision of other normal pregnancy in first trimester ([facility])    [facility] - OB/GYN    Nurse Only          Future Appointments         Provider Department Appt Notes    In 1 week Yumiko Holt MD [facility], [place] - OB/GYN pn    In 1 month Yumiko Holt MD [facility] [place] - OB/GYN pn    In 1 month Gallo Bhakta DO [facility], [place] - OB/GYN pn    In 2 months Stephanie Fragoso MD [facility],  [place] - OB/GYN pn    In 2 months [address] Maternal Fetal Medicine GROWTH/BPP/AMA/MARGINAL PLACENTAL CORD INSERTION  sched NST's @ 32 wks;2x weekly @ 38 wks NST & BPP                       Future Appointments         Provider Department Appt Notes    In 1 week Yumiko Holt MD [facility], [place] - OB/GYN pn    In 1 month Yumiko Holt MD [facility], [place] - OB/GYN pn    In 1 month Gallo Bhakta DO [facility], [place] - OB/GYN pn    In 2 months Stephanie Fragoso MD [facility], [place] - OB/GYN pn    In 2 months [address] Maternal Fetal Medicine GROWTH/BPP/AMA/MARGINAL PLACENTAL CORD INSERTION  sched NST's @ 32 wks;2x weekly @ 38 wks NST & BPP          Recent Outpatient Visits              2 weeks ago Encounter for supervision of normal pregnancy in second trimester, unspecified  ([facility])    [facility], [place] - OB/GYN Jenna Yusuf MD    Routine Prenatal    1 month ago Encounter for supervision of normal pregnancy in first trimester, unspecified  ([facility])    [facility], [place] - OB/GYN Ike Garcia DO    Routine Prenatal    2 months ago [facility]    Telemedicine    2 months ago Encounter for supervision of normal pregnancy in first trimester, unspecified  ([facility])    [facility] Alis Arizmendi MD    Initial Prenatal    2 months ago Encounter for supervision of other normal pregnancy in first trimester ([facility])    [facility], [place] - OB/GYN    Nurse Only

## 2024-11-19 ENCOUNTER — ROUTINE PRENATAL (OUTPATIENT)
Dept: OBGYN CLINIC | Facility: CLINIC | Age: 44
End: 2024-11-19
Payer: MEDICAID

## 2024-11-19 VITALS
BODY MASS INDEX: 22 KG/M2 | SYSTOLIC BLOOD PRESSURE: 90 MMHG | WEIGHT: 123.19 LBS | DIASTOLIC BLOOD PRESSURE: 60 MMHG | HEART RATE: 92 BPM

## 2024-11-19 DIAGNOSIS — Z34.92 ENCOUNTER FOR SUPERVISION OF NORMAL PREGNANCY IN SECOND TRIMESTER, UNSPECIFIED GRAVIDITY (HCC): Primary | ICD-10-CM

## 2024-11-19 LAB
BILIRUBIN: NEGATIVE
GLUCOSE (URINE DIPSTICK): NEGATIVE MG/DL
KETONES (URINE DIPSTICK): NEGATIVE MG/DL
MULTISTIX LOT#: ABNORMAL NUMERIC
NITRITE, URINE: NEGATIVE
OCCULT BLOOD: NEGATIVE
PH, URINE: 7.5 (ref 4.5–8)
PROTEIN (URINE DIPSTICK): NEGATIVE MG/DL
SPECIFIC GRAVITY: 1.01 (ref 1–1.03)
UROBILINOGEN,SEMI-QN: 0.2 MG/DL (ref 0–1.9)

## 2024-11-19 PROCEDURE — 81002 URINALYSIS NONAUTO W/O SCOPE: CPT | Performed by: OBSTETRICS & GYNECOLOGY

## 2024-11-19 PROCEDURE — 99213 OFFICE O/P EST LOW 20 MIN: CPT | Performed by: OBSTETRICS & GYNECOLOGY

## 2024-11-19 NOTE — PROGRESS NOTES
Having calf cramping.  No swelling and negative homans.  Hydration and PO magnesium rec'd . Normal level 2.  Has 2T labs.  RTC 4 wks.      Olvin [id number] Leo  used

## 2024-12-13 ENCOUNTER — LAB ENCOUNTER (OUTPATIENT)
Dept: LAB | Age: 44
End: 2024-12-13
Attending: OBSTETRICS & GYNECOLOGY
Payer: MEDICAID

## 2024-12-13 DIAGNOSIS — Z34.92 ENCOUNTER FOR SUPERVISION OF NORMAL PREGNANCY IN SECOND TRIMESTER, UNSPECIFIED GRAVIDITY (HCC): ICD-10-CM

## 2024-12-13 LAB
ANTIBODY SCREEN: NEGATIVE
BASOPHILS # BLD AUTO: 0.02 X10(3) UL (ref 0–0.2)
BASOPHILS NFR BLD AUTO: 0.3 %
DEPRECATED RDW RBC AUTO: 48 FL (ref 35.1–46.3)
EOSINOPHIL # BLD AUTO: 0.11 X10(3) UL (ref 0–0.7)
EOSINOPHIL NFR BLD AUTO: 1.5 %
ERYTHROCYTE [DISTWIDTH] IN BLOOD BY AUTOMATED COUNT: 14.3 % (ref 11–15)
GLUCOSE 1H P GLC SERPL-MCNC: 169 MG/DL
HCT VFR BLD AUTO: 34.5 %
HGB BLD-MCNC: 11.5 G/DL
IMM GRANULOCYTES # BLD AUTO: 0.01 X10(3) UL (ref 0–1)
IMM GRANULOCYTES NFR BLD: 0.1 %
LYMPHOCYTES # BLD AUTO: 1.18 X10(3) UL (ref 1–4)
LYMPHOCYTES NFR BLD AUTO: 16 %
MCH RBC QN AUTO: 30.7 PG (ref 26–34)
MCHC RBC AUTO-ENTMCNC: 33.3 G/DL (ref 31–37)
MCV RBC AUTO: 92 FL
MONOCYTES # BLD AUTO: 0.35 X10(3) UL (ref 0.1–1)
MONOCYTES NFR BLD AUTO: 4.8 %
NEUTROPHILS # BLD AUTO: 5.69 X10 (3) UL (ref 1.5–7.7)
NEUTROPHILS # BLD AUTO: 5.69 X10(3) UL (ref 1.5–7.7)
NEUTROPHILS NFR BLD AUTO: 77.3 %
PLATELET # BLD AUTO: 276 10(3)UL (ref 150–450)
RBC # BLD AUTO: 3.75 X10(6)UL
RH BLOOD TYPE: NEGATIVE
WBC # BLD AUTO: 7.4 X10(3) UL (ref 4–11)

## 2024-12-13 PROCEDURE — 86850 RBC ANTIBODY SCREEN: CPT | Performed by: OBSTETRICS & GYNECOLOGY

## 2024-12-13 PROCEDURE — 82950 GLUCOSE TEST: CPT

## 2024-12-13 PROCEDURE — 86900 BLOOD TYPING SEROLOGIC ABO: CPT | Performed by: OBSTETRICS & GYNECOLOGY

## 2024-12-13 PROCEDURE — 36415 COLL VENOUS BLD VENIPUNCTURE: CPT | Performed by: OBSTETRICS & GYNECOLOGY

## 2024-12-13 PROCEDURE — 86901 BLOOD TYPING SEROLOGIC RH(D): CPT | Performed by: OBSTETRICS & GYNECOLOGY

## 2024-12-13 PROCEDURE — 85025 COMPLETE CBC W/AUTO DIFF WBC: CPT

## 2024-12-16 ENCOUNTER — TELEPHONE (OUTPATIENT)
Dept: OBGYN CLINIC | Facility: CLINIC | Age: 44
End: 2024-12-16

## 2024-12-16 DIAGNOSIS — R73.9 BLOOD GLUCOSE ELEVATED: Primary | ICD-10-CM

## 2024-12-16 NOTE — TELEPHONE ENCOUNTER
Pt informed of recs and need for 3 hour glucose. Order placed and pt provided with # to CS to set up appt.

## 2024-12-17 ENCOUNTER — ROUTINE PRENATAL (OUTPATIENT)
Dept: OBGYN CLINIC | Facility: CLINIC | Age: 44
End: 2024-12-17

## 2024-12-17 VITALS
BODY MASS INDEX: 22 KG/M2 | WEIGHT: 126 LBS | HEART RATE: 87 BPM | DIASTOLIC BLOOD PRESSURE: 55 MMHG | SYSTOLIC BLOOD PRESSURE: 88 MMHG

## 2024-12-17 DIAGNOSIS — Z34.92 ENCOUNTER FOR SUPERVISION OF NORMAL PREGNANCY IN SECOND TRIMESTER, UNSPECIFIED GRAVIDITY (HCC): Primary | ICD-10-CM

## 2024-12-17 DIAGNOSIS — O09.529 ANTEPARTUM MULTIGRAVIDA OF ADVANCED MATERNAL AGE (HCC): ICD-10-CM

## 2024-12-17 LAB
APPEARANCE: CLEAR
BILIRUBIN: NEGATIVE
GLUCOSE (URINE DIPSTICK): NEGATIVE MG/DL
KETONES (URINE DIPSTICK): NEGATIVE MG/DL
LEUKOCYTES: NEGATIVE
MULTISTIX LOT#: NORMAL NUMERIC
NITRITE, URINE: NEGATIVE
OCCULT BLOOD: NEGATIVE
PH, URINE: 5 (ref 4.5–8)
SPECIFIC GRAVITY: 1.01 (ref 1–1.03)
URINE-COLOR: YELLOW
UROBILINOGEN,SEMI-QN: 1 MG/DL (ref 0–1.9)

## 2024-12-17 PROCEDURE — 81002 URINALYSIS NONAUTO W/O SCOPE: CPT | Performed by: OBSTETRICS & GYNECOLOGY

## 2024-12-17 PROCEDURE — 99213 OFFICE O/P EST LOW 20 MIN: CPT | Performed by: OBSTETRICS & GYNECOLOGY

## 2024-12-17 NOTE — PROGRESS NOTES
Doing well. Having occasional calf muscle cramps; discussed hydration and magnesium supplement. Needs to complete 3 ht gtt. RTC 2 wks    See Prenatal Vitals flowsheet for documentation of vitals / urine / exam. See problem list -- updated in detail.   Used 20-29 min in order to do detailed chart review & review of blood work / ultrasound reports / MFM reports, reviewing history, performing prenatal exam / pelvic exam, counseling pt on vaccines, prenatal education, ordering tests or meds, ordering MFM /specialist referrals, documentation in EMR, interpretation / communication of results & care coordination

## 2024-12-19 ENCOUNTER — HOSPITAL ENCOUNTER (EMERGENCY)
Facility: HOSPITAL | Age: 44
Discharge: HOME | End: 2024-12-20
Attending: EMERGENCY MEDICINE
Payer: MEDICAID

## 2024-12-19 DIAGNOSIS — R42 DIZZINESS: Primary | ICD-10-CM

## 2024-12-19 LAB
BASOPHILS # BLD AUTO: 0.03 X10(3) UL (ref 0–0.2)
BASOPHILS NFR BLD AUTO: 0.4 %
DEPRECATED RDW RBC AUTO: 47 FL (ref 35.1–46.3)
EOSINOPHIL # BLD AUTO: 0.1 X10(3) UL (ref 0–0.7)
EOSINOPHIL NFR BLD AUTO: 1.4 %
ERYTHROCYTE [DISTWIDTH] IN BLOOD BY AUTOMATED COUNT: 14.3 % (ref 11–15)
HCT VFR BLD AUTO: 30.7 %
HGB BLD-MCNC: 10.4 G/DL
IMM GRANULOCYTES # BLD AUTO: 0.01 X10(3) UL (ref 0–1)
IMM GRANULOCYTES NFR BLD: 0.1 %
LYMPHOCYTES # BLD AUTO: 1.7 X10(3) UL (ref 1–4)
LYMPHOCYTES NFR BLD AUTO: 23.9 %
MCH RBC QN AUTO: 30.7 PG (ref 26–34)
MCHC RBC AUTO-ENTMCNC: 33.9 G/DL (ref 31–37)
MCV RBC AUTO: 90.6 FL
MONOCYTES # BLD AUTO: 0.59 X10(3) UL (ref 0.1–1)
MONOCYTES NFR BLD AUTO: 8.3 %
NEUTROPHILS # BLD AUTO: 4.69 X10 (3) UL (ref 1.5–7.7)
NEUTROPHILS # BLD AUTO: 4.69 X10(3) UL (ref 1.5–7.7)
NEUTROPHILS NFR BLD AUTO: 65.9 %
PLATELET # BLD AUTO: 254 10(3)UL (ref 150–450)
RBC # BLD AUTO: 3.39 X10(6)UL
WBC # BLD AUTO: 7.1 X10(3) UL (ref 4–11)

## 2024-12-19 PROCEDURE — 96360 HYDRATION IV INFUSION INIT: CPT

## 2024-12-19 PROCEDURE — 85025 COMPLETE CBC W/AUTO DIFF WBC: CPT | Performed by: EMERGENCY MEDICINE

## 2024-12-19 PROCEDURE — 99285 EMERGENCY DEPT VISIT HI MDM: CPT

## 2024-12-19 PROCEDURE — S0028 INJECTION, FAMOTIDINE, 20 MG: HCPCS | Performed by: EMERGENCY MEDICINE

## 2024-12-19 PROCEDURE — 83690 ASSAY OF LIPASE: CPT | Performed by: EMERGENCY MEDICINE

## 2024-12-19 PROCEDURE — 99284 EMERGENCY DEPT VISIT MOD MDM: CPT

## 2024-12-19 PROCEDURE — 80076 HEPATIC FUNCTION PANEL: CPT | Performed by: EMERGENCY MEDICINE

## 2024-12-19 PROCEDURE — 83735 ASSAY OF MAGNESIUM: CPT | Performed by: EMERGENCY MEDICINE

## 2024-12-19 PROCEDURE — 80048 BASIC METABOLIC PNL TOTAL CA: CPT | Performed by: EMERGENCY MEDICINE

## 2024-12-19 RX ORDER — ACETAMINOPHEN 500 MG
1000 TABLET ORAL ONCE
Status: COMPLETED | OUTPATIENT
Start: 2024-12-19 | End: 2024-12-20

## 2024-12-19 RX ORDER — ONDANSETRON 2 MG/ML
4 INJECTION INTRAMUSCULAR; INTRAVENOUS ONCE
Status: DISCONTINUED | OUTPATIENT
Start: 2024-12-19 | End: 2024-12-20

## 2024-12-19 RX ORDER — FAMOTIDINE 10 MG/ML
20 INJECTION, SOLUTION INTRAVENOUS ONCE
Status: DISCONTINUED | OUTPATIENT
Start: 2024-12-19 | End: 2024-12-20

## 2024-12-20 VITALS
WEIGHT: 126.13 LBS | TEMPERATURE: 98 F | RESPIRATION RATE: 20 BRPM | DIASTOLIC BLOOD PRESSURE: 69 MMHG | BODY MASS INDEX: 22 KG/M2 | OXYGEN SATURATION: 98 % | HEART RATE: 81 BPM | SYSTOLIC BLOOD PRESSURE: 101 MMHG

## 2024-12-20 LAB
ALBUMIN SERPL-MCNC: 3.7 G/DL (ref 3.2–4.8)
ALP LIVER SERPL-CCNC: 78 U/L
ALT SERPL-CCNC: 13 U/L
ANION GAP SERPL CALC-SCNC: 6 MMOL/L (ref 0–18)
AST SERPL-CCNC: 17 U/L (ref ?–34)
BILIRUB DIRECT SERPL-MCNC: <0.1 MG/DL (ref ?–0.3)
BILIRUB SERPL-MCNC: 0.3 MG/DL (ref 0.3–1.2)
BUN BLD-MCNC: 5 MG/DL (ref 9–23)
BUN/CREAT SERPL: 10.9 (ref 10–20)
CALCIUM BLD-MCNC: 8.8 MG/DL (ref 8.7–10.4)
CHLORIDE SERPL-SCNC: 109 MMOL/L (ref 98–112)
CO2 SERPL-SCNC: 24 MMOL/L (ref 21–32)
CREAT BLD-MCNC: 0.46 MG/DL
EGFRCR SERPLBLD CKD-EPI 2021: 122 ML/MIN/1.73M2 (ref 60–?)
GLUCOSE BLD-MCNC: 68 MG/DL (ref 70–99)
LIPASE SERPL-CCNC: 37 U/L (ref 12–53)
MAGNESIUM SERPL-MCNC: 2.1 MG/DL (ref 1.6–2.6)
OSMOLALITY SERPL CALC.SUM OF ELEC: 284 MOSM/KG (ref 275–295)
POTASSIUM SERPL-SCNC: 3.1 MMOL/L (ref 3.5–5.1)
PROT SERPL-MCNC: 6.3 G/DL (ref 5.7–8.2)
SODIUM SERPL-SCNC: 139 MMOL/L (ref 136–145)

## 2024-12-20 RX ORDER — POTASSIUM CHLORIDE 1500 MG/1
40 TABLET, EXTENDED RELEASE ORAL ONCE
Status: COMPLETED | OUTPATIENT
Start: 2024-12-20 | End: 2024-12-20

## 2024-12-20 NOTE — DISCHARGE INSTRUCTIONS
Push fluids, get rest.    Change positions slowly to help with lightheadedness.    See primary care and OB within the next 2 days for a follow-up appointment.      Return to the ER if you develop worsening symptoms, severe neck pain, vertigo or a spinning sensation, double vision, weakness or numbness on one side of the body, or any emergent concerns.

## 2024-12-20 NOTE — ED PROVIDER NOTES
Patient Seen in: [facility] Emergency Department      History     Chief Complaint   Patient presents with    Dizziness     Stated Complaint: Dizziness;  ~26wk Pregnant    Subjective:   HPI      44-year-old G3, P2 at 27 weeks gestation presents to the ER for evaluation of dizziness.  Patient reports while driving to work today she started to feel dizzy, had chills and sweats.  She did vomit 1 time today.  She noted this morning she had left-sided neck pain.  Denies focal weakness or numbness, abdominal pain, loss of fluids, vaginal bleeding.  She is feeling baby move.  Reports she had been told her glucose test was kind of high, states this is causing her some anxiety.    Objective:     Past Medical History:    Asthma (HCC)    Hx of tuberculosis    Ulcer              Past Surgical History:   Procedure Laterality Date          x2                Social History     Socioeconomic History    Marital status:    Tobacco Use    Smoking status: Never    Smokeless tobacco: Never   Substance and Sexual Activity    Alcohol use: No     Alcohol/week: 0.0 standard drinks of alcohol    Drug use: No    Sexual activity: Yes     Partners: Male   Other Topics Concern    Caffeine Concern Yes     Comment: tea one cup     Social Drivers of Health     Financial Resource Strain: Low Risk  (2022)    [facility]    Overall Financial Resource Strain (CARDIA)     Difficulty of Paying Living Expenses: Not hard at all   Food Insecurity: No Food Insecurity (2022)    [facility]    Food Insecurity     Within the past 30 days, I worried whether my food would run out before I got money to buy more. / En los últimos 30 días, me preocupó que la comida se podía acabar antes de tener dinero para compr...: Never true / Nunca     Within the past 30 days, the food that I bought just didn't  last, and I didn't have money to get more. / En los últimos 30 días, La comida que compré no rindió lo suficiente, y no tenía dinero para...: Never true / Nunca   Housing Stability: Low Risk  (2/8/2022)    [facility] from [facility], [facility]    Housing Stability Vital Sign     Unable to Pay for Housing in the Last Year: No     Number of Places Lived in the Last Year: 1     Unstable Housing in the Last Year: No                  Physical Exam     ED Triage Vitals [12/19/24 2156]   /67   Pulse 90   Resp 20   Temp 98 °F (36.7 °C)   Temp src Temporal   SpO2 98 %   O2 Device None (Room air)       Current Vitals:   Vital Signs  BP: 101/69  Pulse: 81  Resp: 20  Temp: 98 °F (36.7 °C)  Temp src: Temporal  MAP (mmHg): 82    Oxygen Therapy  SpO2: 98 %  O2 Device: None (Room air)        Physical Exam  Vitals and nursing note reviewed.   Constitutional:       General: She is not in acute distress.     Appearance: She is well-developed.   HENT:      Head: Normocephalic and atraumatic.   Eyes:      Extraocular Movements: Extraocular movements intact.      Conjunctiva/sclera: Conjunctivae normal.      Pupils: Pupils are equal, round, and reactive to light.   Cardiovascular:      Rate and Rhythm: Normal rate and regular rhythm.      Heart sounds: Normal heart sounds.   Pulmonary:      Effort: Pulmonary effort is normal. No respiratory distress.      Breath sounds: Normal breath sounds.   Abdominal:      General: Bowel sounds are normal. There is no distension.      Palpations: Abdomen is soft.      Tenderness: There is no abdominal tenderness. There is no guarding or rebound.   Musculoskeletal:         General: Normal range of motion.      Cervical back: Normal range of motion and neck supple.   Skin:     General: Skin is warm and dry.      Findings: No rash.   Neurological:      General: No focal deficit present.      Mental Status: She is alert and oriented to person, place, and  time.      Cranial Nerves: No cranial nerve deficit.      Sensory: No sensory deficit.      Motor: No weakness.             ED Course     Labs Reviewed   BASIC METABOLIC PANEL (8) - Abnormal; Notable for the following components:       Result Value    Glucose 68 (*)     Potassium 3.1 (*)     BUN 5 (*)     Creatinine 0.46 (*)     All other components within normal limits   CBC WITH DIFFERENTIAL WITH PLATELET - Abnormal; Notable for the following components:    RBC 3.39 (*)     HGB 10.4 (*)     HCT 30.7 (*)     RDW-SD 47.0 (*)     All other components within normal limits   HEPATIC FUNCTION PANEL (7) - Normal   LIPASE - Normal   MAGNESIUM - Normal                   MDM              Medical Decision Making  Differential diagnosis includes but is not limited to arrhythmia, electrolyte imbalance, dehydration, hypovolemia, physiologic changes of pregnancy, TIA, stroke, vertebral dissection    Well-appearing patient with normal neurovascular exam, low suspicion for vertebral dissection, pain improved with Tylenol.  Patient noted to have low end of normal blood pressure, review of EMR shows patient's blood pressure was 88/55 at an appointment 3 days ago, 90/60 at an appointment 1 month ago, 76/39 at an appointment 1 month ago.  Blood pressure range may be contributing to dizziness or lightheadedness, however seems to be near patient's baseline compared to previous.  Discussed this with the patient, offered observation admission, however patient declines, reports she is feeling much better would like to be discharged home.  We did discuss close outpatient follow-up as well as strict return precautions.  Patient verbalizes understanding of and agreement with this plan.    Problems Addressed:  Dizziness: acute illness or injury    Amount and/or Complexity of Data Reviewed  External Data Reviewed: labs.     Details: Hypokalemia new today, otherwise BMP stable compared to 3/11/2024, CBC stable compared to 12/13/2024  Labs:  ordered.        Disposition and Plan     Clinical Impression:  1. Dizziness         Disposition:  Discharge  12/20/2024  3:26 am    Follow-up:  Loni Beck MD  [address] [place] [address] [phone]    Schedule an appointment as soon as possible for a visit in 1 day(s)  For follow up    Stephanie Fragoso MD  [address] [address] [phone]    Schedule an appointment as soon as possible for a visit in 1 day(s)  For follow up          Medications Prescribed:  Discharge Medication List as of 12/20/2024  3:32 AM              Supplementary Documentation:

## 2024-12-20 NOTE — PROGRESS NOTES
Spoke with Dr. Arizmendi, stated doppler FHT in ED, no need to come to [facility] for monitoring. ED charge RN notified

## 2024-12-20 NOTE — ED INITIAL ASSESSMENT (HPI)
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 +Vomiting and only ate once this morning. States all day she has been drinking water. She is 27 weeks pregnant. Denies vaginal bleeding or abdominal pain.

## 2024-12-20 NOTE — ED QUICK NOTES
Assisting primary RN:   Pt tolerated PO challenge. Pt states she feels well and ready to go home. Vital signs assessed, BP noted to be low, Dr. Rodgers notified, no further orders at this time.

## 2024-12-23 ENCOUNTER — LABORATORY ENCOUNTER (OUTPATIENT)
Dept: LAB | Facility: HOSPITAL | Age: 44
End: 2024-12-23
Attending: OBSTETRICS & GYNECOLOGY
Payer: MEDICAID

## 2024-12-23 DIAGNOSIS — R73.9 BLOOD GLUCOSE ELEVATED: ICD-10-CM

## 2024-12-23 LAB
GLUCOSE 1H P GLC SERPL-MCNC: 157 MG/DL
GLUCOSE 2H P GLC SERPL-MCNC: 142 MG/DL
GLUCOSE 3H P GLC SERPL-MCNC: 85 MG/DL (ref 70–140)
GLUCOSE P FAST SERPL-MCNC: 79 MG/DL

## 2024-12-23 PROCEDURE — 82951 GLUCOSE TOLERANCE TEST (GTT): CPT

## 2024-12-23 PROCEDURE — 36415 COLL VENOUS BLD VENIPUNCTURE: CPT

## 2024-12-23 PROCEDURE — 82952 GTT-ADDED SAMPLES: CPT

## 2024-12-27 ENCOUNTER — HOSPITAL ENCOUNTER (OUTPATIENT)
Facility: HOSPITAL | Age: 44
Discharge: HOME | End: 2024-12-28
Attending: OBSTETRICS & GYNECOLOGY | Admitting: OBSTETRICS & GYNECOLOGY
Payer: MEDICAID

## 2024-12-27 ENCOUNTER — TELEPHONE (OUTPATIENT)
Dept: OBGYN CLINIC | Facility: CLINIC | Age: 44
End: 2024-12-27

## 2024-12-27 LAB
BASOPHILS # BLD AUTO: 0.02 X10(3) UL (ref 0–0.2)
BASOPHILS NFR BLD AUTO: 0.2 %
DEPRECATED RDW RBC AUTO: 48.7 FL (ref 35.1–46.3)
EOSINOPHIL # BLD AUTO: 0.1 X10(3) UL (ref 0–0.7)
EOSINOPHIL NFR BLD AUTO: 1.2 %
ERYTHROCYTE [DISTWIDTH] IN BLOOD BY AUTOMATED COUNT: 14.5 % (ref 11–15)
FIBRINOGEN PPP-MCNC: 529 MG/DL (ref 200–480)
FSP PPP LA-ACNC: NEGATIVE MCG/ML
HCT VFR BLD AUTO: 34.6 %
HGB BLD-MCNC: 11.5 G/DL
IMM GRANULOCYTES # BLD AUTO: 0.02 X10(3) UL (ref 0–1)
IMM GRANULOCYTES NFR BLD: 0.2 %
LYMPHOCYTES # BLD AUTO: 1.56 X10(3) UL (ref 1–4)
LYMPHOCYTES NFR BLD AUTO: 18.5 %
MCH RBC QN AUTO: 30.6 PG (ref 26–34)
MCHC RBC AUTO-ENTMCNC: 33.2 G/DL (ref 31–37)
MCV RBC AUTO: 92 FL
MONOCYTES # BLD AUTO: 0.57 X10(3) UL (ref 0.1–1)
MONOCYTES NFR BLD AUTO: 6.8 %
NEUTROPHILS # BLD AUTO: 6.16 X10 (3) UL (ref 1.5–7.7)
NEUTROPHILS # BLD AUTO: 6.16 X10(3) UL (ref 1.5–7.7)
NEUTROPHILS NFR BLD AUTO: 73.1 %
PLATELET # BLD AUTO: 295 10(3)UL (ref 150–450)
RBC # BLD AUTO: 3.76 X10(6)UL
WBC # BLD AUTO: 8.4 X10(3) UL (ref 4–11)

## 2024-12-27 PROCEDURE — 85362 FIBRIN DEGRADATION PRODUCTS: CPT | Performed by: OBSTETRICS & GYNECOLOGY

## 2024-12-27 PROCEDURE — 85384 FIBRINOGEN ACTIVITY: CPT | Performed by: OBSTETRICS & GYNECOLOGY

## 2024-12-27 PROCEDURE — 85025 COMPLETE CBC W/AUTO DIFF WBC: CPT | Performed by: OBSTETRICS & GYNECOLOGY

## 2024-12-27 NOTE — TELEPHONE ENCOUNTER
Patient called in is at the dentist office, patient is 27 weeks pregnant,  states need a clearance to see the dentist.  Request a nurse to call

## 2024-12-28 VITALS — HEART RATE: 91 BPM | SYSTOLIC BLOOD PRESSURE: 91 MMHG | DIASTOLIC BLOOD PRESSURE: 63 MMHG

## 2024-12-28 PROCEDURE — 36415 COLL VENOUS BLD VENIPUNCTURE: CPT

## 2024-12-28 PROCEDURE — 59025 FETAL NON-STRESS TEST: CPT

## 2024-12-28 PROCEDURE — 99213 OFFICE O/P EST LOW 20 MIN: CPT
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
## 2025-01-03 ENCOUNTER — TELEPHONE (OUTPATIENT)
Dept: OBGYN CLINIC | Facility: CLINIC | Age: 45
End: 2025-01-03

## 2025-01-03 ENCOUNTER — ROUTINE PRENATAL (OUTPATIENT)
Dept: OBGYN CLINIC | Facility: CLINIC | Age: 45
End: 2025-01-03
Payer: MEDICAID

## 2025-01-03 VITALS
BODY MASS INDEX: 23 KG/M2 | HEART RATE: 94 BPM | DIASTOLIC BLOOD PRESSURE: 52 MMHG | SYSTOLIC BLOOD PRESSURE: 79 MMHG | WEIGHT: 128.81 LBS

## 2025-01-03 DIAGNOSIS — Z34.92 ENCOUNTER FOR SUPERVISION OF NORMAL PREGNANCY IN SECOND TRIMESTER, UNSPECIFIED GRAVIDITY (HCC): Primary | ICD-10-CM

## 2025-01-03 DIAGNOSIS — Z67.91 RH NEGATIVE STATE IN ANTEPARTUM PERIOD, THIRD TRIMESTER (HCC): ICD-10-CM

## 2025-01-03 DIAGNOSIS — O09.529 ANTEPARTUM MULTIGRAVIDA OF ADVANCED MATERNAL AGE (HCC): ICD-10-CM

## 2025-01-03 DIAGNOSIS — O26.893 RH NEGATIVE STATE IN ANTEPARTUM PERIOD, THIRD TRIMESTER (HCC): ICD-10-CM

## 2025-01-03 LAB
APPEARANCE: CLEAR
BILIRUBIN: NEGATIVE
GLUCOSE (URINE DIPSTICK): NEGATIVE MG/DL
KETONES (URINE DIPSTICK): NEGATIVE MG/DL
MULTISTIX LOT#: ABNORMAL NUMERIC
NITRITE, URINE: NEGATIVE
OCCULT BLOOD: NEGATIVE
PH, URINE: 8 (ref 4.5–8)
PROTEIN (URINE DIPSTICK): NEGATIVE MG/DL
SPECIFIC GRAVITY: 1 (ref 1–1.03)
URINE-COLOR: YELLOW
UROBILINOGEN,SEMI-QN: 0.2 MG/DL (ref 0–1.9)

## 2025-01-03 PROCEDURE — 99214 OFFICE O/P EST MOD 30 MIN: CPT | Performed by: OBSTETRICS & GYNECOLOGY

## 2025-01-03 PROCEDURE — 96372 THER/PROPH/DIAG INJ SC/IM: CPT | Performed by: OBSTETRICS & GYNECOLOGY

## 2025-01-03 PROCEDURE — 81002 URINALYSIS NONAUTO W/O SCOPE: CPT | Performed by: OBSTETRICS & GYNECOLOGY

## 2025-01-03 NOTE — PROGRESS NOTES
VIS provided and consent obtained. Patient very nervous so placed in side-lying position on exam table for injection. Rhogam admin to right glute per patient preference. Patient tolerated well.

## 2025-01-03 NOTE — TELEPHONE ENCOUNTER
OB GYN SURGICAL SCHEDULING    Assessment: previous C-sections, request for aterilization    Pre-Operative Procedure:  Repeat  with bilateral salpingectomy    Side: bilateral    In / on:     Date:  03-10-25    Admission:  AM Admit    Anesthesia: Spinal    Additional Orders:  Routine Orders    Comments / Orders to Nurse: I signed [employer] 30 day form today using Language Line during visit.

## 2025-01-03 NOTE — TELEPHONE ENCOUNTER
Sabino Manning seen in office today for prenatal visit.   She states Dr. Bhakta discussed possible  date of 3/10/25.   She is requesting note for employer stating she can start maternity leave on 3/1/25.     Dr. Bhakta's notes not yet in. Advised I will check with him and let her know.   To Dr. Bhakta to advise.

## 2025-01-03 NOTE — PROGRESS NOTES
Language line- Fransicoin- \" Can\" # [id number].  See Prenatal Vitals flowsheet for documentation of vitals / urine / exam. See problem list -- updated in detail.   Used 30-39 min in order to do detailed chart review & review of blood work / ultrasound reports / MFM reports, reviewing history, performing prenatal exam / pelvic exam, counseling pt on vaccines, prenatal education, ordering tests or meds, ordering MFM /specialist referrals, documentation in EMR, interpretation / communication of results & care coordination.    No S/S of PTL. We discussed RLTCS timing and then sterilization. She does opt for sterilization. I explained salpingectomy and then discussed 30 day form for state insurance. We signed it today and sent for scanning. Surgery order sent.

## 2025-01-06 NOTE — TELEPHONE ENCOUNTER
Used # [id number] \"Aye\"    Patient aware section is scheduled on Mon,3/10 at 730am with Dr. Yusuf.    Will reach out to assist Mid-Jan.    Labor and delivery instructions sent, antimicrobial wash instructions sent , and enhanced recovery instructions sent. Via [employer]    Delayed staff message sent to RN pool to please place pre-op orders    Delayed staff message sent to MD to please place pre-op orders    Entered in book and calendar

## 2025-01-13 ENCOUNTER — ROUTINE PRENATAL (OUTPATIENT)
Dept: OBGYN CLINIC | Facility: CLINIC | Age: 45
End: 2025-01-13
Payer: MEDICAID

## 2025-01-13 VITALS
DIASTOLIC BLOOD PRESSURE: 58 MMHG | WEIGHT: 130.63 LBS | SYSTOLIC BLOOD PRESSURE: 85 MMHG | HEART RATE: 85 BPM | BODY MASS INDEX: 23 KG/M2

## 2025-01-13 DIAGNOSIS — Z34.92 ENCOUNTER FOR SUPERVISION OF NORMAL PREGNANCY IN SECOND TRIMESTER, UNSPECIFIED GRAVIDITY (HCC): Primary | ICD-10-CM

## 2025-01-13 DIAGNOSIS — O09.529 ANTEPARTUM MULTIGRAVIDA OF ADVANCED MATERNAL AGE (HCC): ICD-10-CM

## 2025-01-13 LAB
APPEARANCE: CLEAR
BILIRUBIN: NEGATIVE
GLUCOSE (URINE DIPSTICK): NEGATIVE MG/DL
KETONES (URINE DIPSTICK): NEGATIVE MG/DL
LEUKOCYTES: NEGATIVE
MULTISTIX LOT#: NORMAL NUMERIC
NITRITE, URINE: NEGATIVE
OCCULT BLOOD: NEGATIVE
PH, URINE: 7.5 (ref 4.5–8)
PROTEIN (URINE DIPSTICK): NEGATIVE MG/DL
SPECIFIC GRAVITY: 1 (ref 1–1.03)
URINE-COLOR: YELLOW
UROBILINOGEN,SEMI-QN: 0.2 MG/DL (ref 0–1.9)

## 2025-01-13 NOTE — PROGRESS NOTES
used Miriam #[id number]  rCSx scheduled 3/10 w/ BTL -- papers signed already.  Has next MFM ultrasound 1/20  Flu vaccine -- received  TDAP -- wishes today  RSV -- needs to investigate coverage  Please call your insurance about RSV vaccine coverage called [employer]. You are eligible to receive if you are 32 weeks 0 days until 36 weeks 6 days during Sept 1st-Jan 31st. A fee waiver for $683 will be required to be signed even if your insurance states it will cover. Newborns may also receive the vaccine (Beyfortus) after delivery however pricing is estimated to be higher ($1200).   See Prenatal Vitals flowsheet for documentation of vitals / urine / exam. See problem list -- updated in detail.   Used 30-39 min in order to do detailed chart review & review of blood work / ultrasound reports / MFM reports, reviewing history, performing prenatal exam / pelvic exam, counseling pt on vaccines, prenatal education, ordering tests or meds, ordering MFM /specialist referrals, documentation in EMR, interpretation / communication of results & care coordination

## 2025-01-15 ENCOUNTER — TELEPHONE (OUTPATIENT)
Dept: OBGYN CLINIC | Facility: CLINIC | Age: 45
End: 2025-01-15

## 2025-01-15 NOTE — TELEPHONE ENCOUNTER
Order received from [employer] for breast pump.   Form completed and faxed with confirmation.   Sent to scanning.

## 2025-01-20 ENCOUNTER — HOSPITAL ENCOUNTER (OUTPATIENT)
Dept: PERINATAL CARE | Facility: HOSPITAL | Age: 45
Discharge: HOME | End: 2025-01-20
Attending: OBSTETRICS & GYNECOLOGY
Payer: MEDICAID

## 2025-01-20 ENCOUNTER — HOSPITAL ENCOUNTER (OUTPATIENT)
Dept: PERINATAL CARE | Facility: HOSPITAL | Age: 45
End: 2025-01-20
Attending: OBSTETRICS & GYNECOLOGY
Payer: MEDICAID

## 2025-01-20 VITALS
HEART RATE: 105 BPM | SYSTOLIC BLOOD PRESSURE: 97 MMHG | BODY MASS INDEX: 23 KG/M2 | WEIGHT: 130 LBS | DIASTOLIC BLOOD PRESSURE: 63 MMHG

## 2025-01-20 DIAGNOSIS — O09.523 MULTIGRAVIDA OF ADVANCED MATERNAL AGE IN THIRD TRIMESTER (HCC): Primary | ICD-10-CM

## 2025-01-20 DIAGNOSIS — O09.529 ANTEPARTUM MULTIGRAVIDA OF ADVANCED MATERNAL AGE (HCC): ICD-10-CM

## 2025-01-20 DIAGNOSIS — Z98.891 PREVIOUS CESAREAN SECTION: ICD-10-CM

## 2025-01-20 PROCEDURE — 76819 FETAL BIOPHYS PROFIL W/O NST: CPT

## 2025-01-20 PROCEDURE — 76816 OB US FOLLOW-UP PER FETUS: CPT | Performed by: OBSTETRICS & GYNECOLOGY

## 2025-01-20 NOTE — PROGRESS NOTES
Outpatient Maternal-Fetal Medicine Follow-Up     Dear Dr. Arizmendi,     Thank you for requesting ultrasound evaluation and maternal fetal medicine consultation on your patient Sabino Manning.  As you are aware she is a 41/42 year old female  with a Lopez pregnancy and an Estimated Date of Delivery: 3/16/25.  She returned to maternal-fetal medicine today for a follow-up visit.  Her history was reviewed from her prior visit and there were no interval changes.     Antepartum Risk Factors  AMA, low risk cfDNA screen  H/o prior  x 2    S: Lots of fetal movement.    PHYSICAL EXAMINATION:  BP 97/63   Pulse 105   Wt 130 lb (59 kg)   LMP 2024 (Approximate)   BMI 23.03 kg/m²   General: alert and oriented, no acute distress  Abdomen: gravid, soft, non-tender       OBSTETRIC ULTRASOUND  The patient had a growth ultrasound today which I interpreted the results and reviewed them with the patient.  Ultrasound Findings:  Single IUP in cephalic presentation.    Placenta is anterior.   A 3 vessel cord is noted.  Cardiac activity is present at 162 bpm  EFW 2208 g ( 4 lb 14 oz); 67%.    SURENDRA is  16.3 cm.  MVP is 5 cm  BPP is 8/8.     The fetal measurements are consistent with established EDC. No gross ultrasound evidence of structural abnormalities are seen today. The patient understands that ultrasound cannot rule out all structural and chromosomal abnormalities.       See imaging tab for the complete US report.     DISCUSSION  During her visit we discussed and reviewed the following issues:  ADVANCED MATERNAL AGE - discussed previously and reviewed. See [facility] note from 24 for detailed review.    I reviewed with the patient that pregnancies in women of advanced maternal age (35 or older at delivery) are associated with elevated risks. Specifically, there is a higher rate of:  Fetal malformations  Preeclampsia  Gestational diabetes  Intrauterine fetal death     As a result, enhanced pregnancy surveillance is  advised for these patients including a comprehensive ultrasound to assess for fetal malformations (at 20 weeks) and a third trimester ultrasound assessment for fetal growth (at 32 weeks). In addition, weekly NST's (initiating at 36 weeks gestation for women 35-39 years and at 32 weeks gestation for women 40 years and older) are also advised. Routine obstetric care is more than adequate to assess for gestational diabetes and preeclampsia; hence, no further significant alterations in obstetric care are advised.          Fetal Aneuploidy  We reviewed her low risk cell free DNA screen and the role of the level 2 ultrasound.    We discussed her low risk cell free DNA screen and her normal level II ultrasound today.  We discussed her option for amniocentesis but that the likelihood of finding a genetic concern is quite low after her other low risk evaluations.  She appropriately declined invasive prenatal genetic diagnostic testing.    MARGINAL PLACENTAL UMBILICAL CORD INSERTION  The umbilical cord is marginally inserted into the placenta.  This may be associated with a higher risk for fetal growth restriction.  As a result, a follow-up growth ultrasound is advised at 32 weeks to re-evaluate interval fetal growth.    GLUCOSE 1HR OB   Date Value Ref Range Status   12/13/2024 169 (H) See comment mg/dL Final     Comment:     If the plasma glucose level measured after 1 hour is >=130, 135 or 140 mg/dl proceed to \"Glucose Tolerance, 100 gm (0 hr, 1 hr, 2hr, 3hr), Gestational (ADA)\" test on a separate day, as clinically indicated.       08/22/2024 108 See comment mg/dL Final     Comment:     If the plasma glucose level measured after 1 hour is >=130, 135 or 140 mg/dl proceed to \"Glucose Tolerance, 100 gm (0 hr, 1 hr, 2hr, 3hr), Gestational (ADA)\" test on a separate day, as clinically indicated.         Lab Results   Component Value Date    GLUFG 79 12/23/2024    GLU1G 157 12/23/2024    GLU2G 142 12/23/2024    GLU3G 85  2024     Reasons to go to L&D reviewed.     We discussed the recommended plan of care based on her  risk factors.  Ma had their questions answered to their satisfaction.       [employer]  [id number] was used     IMPRESSION:  IUP at 32w1d   Normal growth  Marginal placental cord insertion  AMA  - low risk cfDNA screen.  Declines aneuploidy testing  Prior  x 2     RECOMMENDATIONS:  Continue care with Dr. Arizmendi  Follow-up Growth ultrasound with BPP at 32 weeks.  Weekly NST's at 32 weeks.  Twice weekly testing at 38 weeks - weekly NST and weekly BPP  Delivery at 39-40 weeks.     Total time spent 30 minutes this calendar day which includes preparing to see the patient including chart review, obtaining and/or reviewing additional medical history, performing a physical exam and evaluation, documenting clinical information in the electronic medical record, independently interpreting results, counseling the patient, communicating results to the patient/family/caregiver and coordinating care.     Case discussed with patient who demonstrated understanding and agreement with plan.     Thank you for allowing me to participate in the care of this patient.  Please feel free to contact me with any questions.    Massiel Roberts MD   Maternal-Fetal Medicine       Note to patient and family:  The 21st Century Cures Act makes medical notes available to patients in the interest of transparency.  However, please be advised that this is a medical document.  It is intended as a peer to peer communication.  It is written in medical language and may contain abbreviations or verbiage that are technical and unfamiliar.  It may appear blunt or direct.  Medical documents are intended to carry relevant information, facts as evident, and the clinical opinion of the practitioner.

## 2025-01-28 ENCOUNTER — HOSPITAL ENCOUNTER (OUTPATIENT)
Dept: PERINATAL CARE | Facility: HOSPITAL | Age: 45
Discharge: HOME | End: 2025-01-28
Attending: OBSTETRICS & GYNECOLOGY
Payer: MEDICAID

## 2025-01-28 DIAGNOSIS — O09.529 ANTEPARTUM MULTIGRAVIDA OF ADVANCED MATERNAL AGE (HCC): Primary | ICD-10-CM

## 2025-01-28 PROCEDURE — 59025 FETAL NON-STRESS TEST: CPT | Performed by: OBSTETRICS & GYNECOLOGY

## 2025-01-28 PROCEDURE — 59025 FETAL NON-STRESS TEST: CPT

## 2025-01-29 ENCOUNTER — ROUTINE PRENATAL (OUTPATIENT)
Dept: OBGYN CLINIC | Facility: CLINIC | Age: 45
End: 2025-01-29

## 2025-01-29 ENCOUNTER — TELEPHONE (OUTPATIENT)
Dept: OBGYN CLINIC | Facility: CLINIC | Age: 45
End: 2025-01-29

## 2025-01-29 ENCOUNTER — LAB ENCOUNTER (OUTPATIENT)
Dept: LAB | Facility: HOSPITAL | Age: 45
End: 2025-01-29
Attending: OBSTETRICS & GYNECOLOGY
Payer: MEDICAID

## 2025-01-29 VITALS
BODY MASS INDEX: 24 KG/M2 | WEIGHT: 133.38 LBS | SYSTOLIC BLOOD PRESSURE: 80 MMHG | HEART RATE: 92 BPM | DIASTOLIC BLOOD PRESSURE: 48 MMHG

## 2025-01-29 DIAGNOSIS — Z34.92 ENCOUNTER FOR SUPERVISION OF NORMAL PREGNANCY IN SECOND TRIMESTER, UNSPECIFIED GRAVIDITY (HCC): ICD-10-CM

## 2025-01-29 DIAGNOSIS — Z34.93 ENCOUNTER FOR SUPERVISION OF NORMAL PREGNANCY IN THIRD TRIMESTER, UNSPECIFIED GRAVIDITY (HCC): Primary | ICD-10-CM

## 2025-01-29 DIAGNOSIS — Z34.93 ENCOUNTER FOR SUPERVISION OF NORMAL PREGNANCY IN THIRD TRIMESTER, UNSPECIFIED GRAVIDITY (HCC): ICD-10-CM

## 2025-01-29 LAB
ANION GAP SERPL CALC-SCNC: 9 MMOL/L (ref 0–18)
APPEARANCE: CLEAR
BASOPHILS # BLD AUTO: 0.03 X10(3) UL (ref 0–0.2)
BASOPHILS NFR BLD AUTO: 0.4 %
BILIRUBIN: NEGATIVE
BUN BLD-MCNC: <5 MG/DL (ref 9–23)
CALCIUM BLD-MCNC: 9.2 MG/DL (ref 8.7–10.4)
CHLORIDE SERPL-SCNC: 107 MMOL/L (ref 98–112)
CO2 SERPL-SCNC: 22 MMOL/L (ref 21–32)
CREAT BLD-MCNC: 0.47 MG/DL
DEPRECATED RDW RBC AUTO: 45.4 FL (ref 35.1–46.3)
EGFRCR SERPLBLD CKD-EPI 2021: 122 ML/MIN/1.73M2 (ref 60–?)
EOSINOPHIL # BLD AUTO: 0.1 X10(3) UL (ref 0–0.7)
EOSINOPHIL NFR BLD AUTO: 1.3 %
ERYTHROCYTE [DISTWIDTH] IN BLOOD BY AUTOMATED COUNT: 13.7 % (ref 11–15)
FASTING STATUS PATIENT QL REPORTED: NO
GLUCOSE (URINE DIPSTICK): NEGATIVE MG/DL
GLUCOSE BLD-MCNC: 84 MG/DL (ref 70–99)
HCT VFR BLD AUTO: 32.9 %
HGB BLD-MCNC: 11.6 G/DL
IMM GRANULOCYTES # BLD AUTO: 0.04 X10(3) UL (ref 0–1)
IMM GRANULOCYTES NFR BLD: 0.5 %
KETONES (URINE DIPSTICK): NEGATIVE MG/DL
LEUKOCYTES: NEGATIVE
LYMPHOCYTES # BLD AUTO: 1.29 X10(3) UL (ref 1–4)
LYMPHOCYTES NFR BLD AUTO: 16.8 %
MCH RBC QN AUTO: 31.8 PG (ref 26–34)
MCHC RBC AUTO-ENTMCNC: 35.3 G/DL (ref 31–37)
MCV RBC AUTO: 90.1 FL
MONOCYTES # BLD AUTO: 0.57 X10(3) UL (ref 0.1–1)
MONOCYTES NFR BLD AUTO: 7.4 %
MULTISTIX LOT#: NORMAL NUMERIC
NEUTROPHILS # BLD AUTO: 5.65 X10 (3) UL (ref 1.5–7.7)
NEUTROPHILS # BLD AUTO: 5.65 X10(3) UL (ref 1.5–7.7)
NEUTROPHILS NFR BLD AUTO: 73.6 %
NITRITE, URINE: NEGATIVE
OCCULT BLOOD: NEGATIVE
PH, URINE: 6.5 (ref 4.5–8)
PLATELET # BLD AUTO: 235 10(3)UL (ref 150–450)
POTASSIUM SERPL-SCNC: 3.4 MMOL/L (ref 3.5–5.1)
PROTEIN (URINE DIPSTICK): NEGATIVE MG/DL
RBC # BLD AUTO: 3.65 X10(6)UL
SODIUM SERPL-SCNC: 138 MMOL/L (ref 136–145)
SPECIFIC GRAVITY: 1.01 (ref 1–1.03)
T PALLIDUM AB SER QL IA: NONREACTIVE
URINE-COLOR: YELLOW
UROBILINOGEN,SEMI-QN: 0.2 MG/DL (ref 0–1.9)
WBC # BLD AUTO: 7.7 X10(3) UL (ref 4–11)

## 2025-01-29 PROCEDURE — 81002 URINALYSIS NONAUTO W/O SCOPE: CPT | Performed by: OBSTETRICS & GYNECOLOGY

## 2025-01-29 PROCEDURE — 85025 COMPLETE CBC W/AUTO DIFF WBC: CPT

## 2025-01-29 PROCEDURE — 36415 COLL VENOUS BLD VENIPUNCTURE: CPT

## 2025-01-29 PROCEDURE — 86780 TREPONEMA PALLIDUM: CPT

## 2025-01-29 PROCEDURE — 87389 HIV-1 AG W/HIV-1&-2 AB AG IA: CPT

## 2025-01-29 PROCEDURE — 99213 OFFICE O/P EST LOW 20 MIN: CPT | Performed by: OBSTETRICS & GYNECOLOGY

## 2025-01-29 PROCEDURE — 80048 BASIC METABOLIC PNL TOTAL CA: CPT

## 2025-01-29 RX ORDER — POTASSIUM CHLORIDE 1500 MG/1
40 TABLET, EXTENDED RELEASE ORAL ONCE
Qty: 2 TABLET | Refills: 0 | Status: SHIPPED | OUTPATIENT
Start: 2025-01-29 | End: 2025-01-29

## 2025-01-29 NOTE — PROGRESS NOTES
Language line.  Feeling dizzy for last 2 days.  Will order BMP and CBC.  Will also draw 3T labs.  Had NST yesterday.  RTC 2 week.    See Prenatal Vitals flowsheet for documentation of vitals / urine / exam. See problem list -- updated in detail.   Used 20-29 min in order to do detailed chart review & review of blood work / ultrasound reports / MFM reports, reviewing history, performing prenatal exam / pelvic exam, counseling pt on vaccines, prenatal education, ordering tests or meds, ordering MFM /specialist referrals, documentation in EMR, interpretation / communication of results & care coordination

## 2025-01-29 NOTE — TELEPHONE ENCOUNTER
----- Message from Jenna Yusuf sent at 1/29/2025 12:10 PM CST -----  K+ slightly low at 3.4.  Needs potassium chloride 40 mEq po x 1 dose.

## 2025-01-30 ENCOUNTER — NST DOCUMENTATION (OUTPATIENT)
Dept: OBGYN CLINIC | Facility: CLINIC | Age: 45
End: 2025-01-30

## 2025-01-30 NOTE — NST
Nonstress Test   Patient: Sabino Manning    Gestation: 33w4d    Diagnosis from order:  AMA    Problem List:   Patient Active Problem List   Diagnosis    Anxiety and depression    Mild intermittent asthma with acute exacerbation (HCC)    Previous  section x2    Antepartum multigravida of advanced maternal age ([facility])    Rh negative, antepartum ([facility])       NST:        2025   NST DOCUMENTATION   Variability 6-25 BPM   Accelerations Yes   Decelerations None   Baseline 130 BPM   Uterine Irritability No   Contractions Irregular   Contraction Frequency x2 not felt   Acoustic Stimulator No   Nonstress Test Interpretation Reactive   Comments PT denies feeling cramping or tightening or LOF  PTL instructions given   NST Completed by Garth RIOS   Disposition Home    Testing Plan Weekly NST   Provider Notified [place] BEE         I agree with the above evaluation. NST completed.  Stephanie Fragoso MD  2025  9:25 AM

## 2025-02-04 ENCOUNTER — HOSPITAL ENCOUNTER (OUTPATIENT)
Dept: PERINATAL CARE | Facility: HOSPITAL | Age: 45
Discharge: HOME | End: 2025-02-04
Attending: OBSTETRICS & GYNECOLOGY
Payer: MEDICAID

## 2025-02-04 DIAGNOSIS — O09.529 ANTEPARTUM MULTIGRAVIDA OF ADVANCED MATERNAL AGE (HCC): Primary | ICD-10-CM

## 2025-02-04 PROCEDURE — 59025 FETAL NON-STRESS TEST: CPT | Performed by: OBSTETRICS & GYNECOLOGY

## 2025-02-04 PROCEDURE — 59025 FETAL NON-STRESS TEST: CPT

## 2025-02-06 ENCOUNTER — NST DOCUMENTATION (OUTPATIENT)
Dept: OBGYN CLINIC | Facility: CLINIC | Age: 45
End: 2025-02-06

## 2025-02-06 NOTE — NST
Nonstress Test   Patient: Sabino Manning    Gestation: 34w4d    Diagnosis from order:      Problem List:   Patient Active Problem List   Diagnosis    Anxiety and depression    Mild intermittent asthma with acute exacerbation ([facility])    Previous  section x2    Antepartum multigravida of advanced maternal age ([facility])    Rh negative, antepartum ([facility])       NST:        2025   NST DOCUMENTATION   Variability 6-25 BPM   Accelerations Yes   Decelerations None   Baseline 130 BPM   Uterine Irritability Yes   Contractions Irregular   Contraction Frequency Pt states occasional tightening no labor pain per pt   Acoustic Stimulator No   Nonstress Test Interpretation Reactive   NST Completed by Garth RIOS   Disposition Home    Testing Plan Weekly NST   Provider Notified Kyleigh BEE         I agree with the above evaluation. NST completed.  Alis Arizmendi MD  2025  12:10 PM

## 2025-02-10 ENCOUNTER — TELEPHONE (OUTPATIENT)
Dept: OBGYN CLINIC | Facility: CLINIC | Age: 45
End: 2025-02-10

## 2025-02-10 NOTE — TELEPHONE ENCOUNTER
Patient is 35w1d, reports swelling and pain to her wrists. Denies injury. Informed patient due to increase blood volume during pregnancy inflammation and pain is common. Advised to take tylenol regular or extra strength and follow up during prenatal visit tomorrow. Patient agrees.

## 2025-02-10 NOTE — TELEPHONE ENCOUNTER
Patient called states she has severe pain and swelling in both hands and feet. She states this has been going on for over a day now. She ask that someone please give her a call ASAP

## 2025-02-11 ENCOUNTER — TELEPHONE (OUTPATIENT)
Dept: OBGYN CLINIC | Facility: CLINIC | Age: 45
End: 2025-02-11

## 2025-02-11 ENCOUNTER — HOSPITAL ENCOUNTER (OUTPATIENT)
Dept: PERINATAL CARE | Facility: HOSPITAL | Age: 45
Discharge: HOME | End: 2025-02-11
Attending: OBSTETRICS & GYNECOLOGY
Payer: MEDICAID

## 2025-02-11 ENCOUNTER — ROUTINE PRENATAL (OUTPATIENT)
Dept: OBGYN CLINIC | Facility: CLINIC | Age: 45
End: 2025-02-11
Payer: MEDICAID

## 2025-02-11 VITALS
WEIGHT: 135.19 LBS | BODY MASS INDEX: 24 KG/M2 | HEART RATE: 86 BPM | SYSTOLIC BLOOD PRESSURE: 87 MMHG | DIASTOLIC BLOOD PRESSURE: 54 MMHG

## 2025-02-11 DIAGNOSIS — Z34.93 ENCOUNTER FOR SUPERVISION OF NORMAL PREGNANCY IN THIRD TRIMESTER, UNSPECIFIED GRAVIDITY (HCC): Primary | ICD-10-CM

## 2025-02-11 DIAGNOSIS — O09.529 ANTEPARTUM MULTIGRAVIDA OF ADVANCED MATERNAL AGE (HCC): Primary | ICD-10-CM

## 2025-02-11 LAB
APPEARANCE: CLEAR
BILIRUBIN: NEGATIVE
GLUCOSE (URINE DIPSTICK): NEGATIVE MG/DL
KETONES (URINE DIPSTICK): NEGATIVE MG/DL
LEUKOCYTES: NEGATIVE
MULTISTIX LOT#: NORMAL NUMERIC
NITRITE, URINE: NEGATIVE
OCCULT BLOOD: NEGATIVE
PH, URINE: 6.5 (ref 4.5–8)
PROTEIN (URINE DIPSTICK): NEGATIVE MG/DL
SPECIFIC GRAVITY: 1 (ref 1–1.03)
URINE-COLOR: YELLOW
UROBILINOGEN,SEMI-QN: 0.2 MG/DL (ref 0–1.9)

## 2025-02-11 PROCEDURE — 59025 FETAL NON-STRESS TEST: CPT

## 2025-02-11 PROCEDURE — 99214 OFFICE O/P EST MOD 30 MIN: CPT | Performed by: OBSTETRICS & GYNECOLOGY

## 2025-02-11 PROCEDURE — 81002 URINALYSIS NONAUTO W/O SCOPE: CPT | Performed by: OBSTETRICS & GYNECOLOGY

## 2025-02-11 NOTE — TELEPHONE ENCOUNTER
Pt requesting I do her rCSx w/ BS -- please move 3/10 rCSx to 3/12 w/ me on call -- do 1130 slot or 3;30 slot

## 2025-02-16 NOTE — PROGRESS NOTES
CINDI #[id number]  C/o bilateral arm pain w/ hand swelling & tingling x 4 days. Recommend carpal tunnel wrist bands. Avoid all salt in diet. NST q Tues. Wishes to change rCSx w/ BS to have me do surgery -- will move to 3/12.  See Prenatal Vitals flowsheet for documentation of vitals / urine / exam. See problem list -- updated in detail.   Used 30-39 min in order to do detailed chart review & review of blood work / ultrasound reports / MFM reports, reviewing history, performing prenatal exam / pelvic exam, counseling pt on vaccines, prenatal education, ordering tests or meds, ordering MFM /specialist referrals, documentation in EMR, interpretation / communication of results & care coordination

## 2025-02-18 ENCOUNTER — ROUTINE PRENATAL (OUTPATIENT)
Dept: OBGYN CLINIC | Facility: CLINIC | Age: 45
End: 2025-02-18

## 2025-02-18 ENCOUNTER — HOSPITAL ENCOUNTER (OUTPATIENT)
Dept: PERINATAL CARE | Facility: HOSPITAL | Age: 45
Discharge: HOME | End: 2025-02-18
Attending: OBSTETRICS & GYNECOLOGY
Payer: MEDICAID

## 2025-02-18 ENCOUNTER — NST DOCUMENTATION (OUTPATIENT)
Dept: OBGYN CLINIC | Facility: CLINIC | Age: 45
End: 2025-02-18

## 2025-02-18 VITALS
HEART RATE: 97 BPM | SYSTOLIC BLOOD PRESSURE: 101 MMHG | BODY MASS INDEX: 24 KG/M2 | WEIGHT: 137 LBS | DIASTOLIC BLOOD PRESSURE: 66 MMHG

## 2025-02-18 DIAGNOSIS — Z34.93 ENCOUNTER FOR SUPERVISION OF NORMAL PREGNANCY IN THIRD TRIMESTER, UNSPECIFIED GRAVIDITY (HCC): Primary | ICD-10-CM

## 2025-02-18 DIAGNOSIS — O09.529 ANTEPARTUM MULTIGRAVIDA OF ADVANCED MATERNAL AGE (HCC): Primary | ICD-10-CM

## 2025-02-18 PROCEDURE — 59025 FETAL NON-STRESS TEST: CPT | Performed by: OBSTETRICS & GYNECOLOGY

## 2025-02-18 PROCEDURE — 99213 OFFICE O/P EST LOW 20 MIN: CPT | Performed by: OBSTETRICS & GYNECOLOGY

## 2025-02-18 PROCEDURE — 59025 FETAL NON-STRESS TEST: CPT

## 2025-02-18 PROCEDURE — 81002 URINALYSIS NONAUTO W/O SCOPE: CPT | Performed by: OBSTETRICS & GYNECOLOGY

## 2025-02-18 NOTE — PROGRESS NOTES
Having bilateral arm and wrist pain, denies neck pain. Discussed wrist splints. GBS collected. RTC 1 week    See Prenatal Vitals flowsheet for documentation of vitals / urine / exam. See problem list -- updated in detail.   Used 20-29 min in order to do detailed chart review & review of blood work / ultrasound reports / MFM reports, reviewing history, performing prenatal exam / pelvic exam, counseling pt on vaccines, prenatal education, ordering tests or meds, ordering MFM /specialist referrals, documentation in EMR, interpretation / communication of results & care coordination

## 2025-02-19 LAB — GROUP B STREP BY PCR FOR PCR OVT: POSITIVE

## 2025-02-19 NOTE — NST
Nonstress Test   Patient: Sabino Manning    Gestation: 36w2d    Diagnosis from order:  AMA    Problem List:   Patient Active Problem List   Diagnosis    Anxiety and depression    Mild intermittent asthma with acute exacerbation ([facility])    Previous  section x2    Antepartum multigravida of advanced maternal age ([facility])    Rh negative, antepartum ([facility])       NST:        2025   NST DOCUMENTATION   Variability 6-25 BPM   Accelerations Yes   Decelerations None   Baseline 135 BPM   Uterine Irritability No   Contractions Irregular   Acoustic Stimulator No   Nonstress Test Interpretation Reactive   NST Completed by Garth RIOS   Disposition HOme    Testing Plan Weekly NST   Provider Notified Jonathon BEE         I agree with the above evaluation. NST completed.  Yumiko Holt MD  2025  11:31 PM

## 2025-02-24 ENCOUNTER — ROUTINE PRENATAL (OUTPATIENT)
Dept: OBGYN CLINIC | Facility: CLINIC | Age: 45
End: 2025-02-24
Payer: MEDICAID

## 2025-02-24 VITALS
HEART RATE: 99 BPM | DIASTOLIC BLOOD PRESSURE: 54 MMHG | WEIGHT: 137.19 LBS | SYSTOLIC BLOOD PRESSURE: 87 MMHG | BODY MASS INDEX: 24 KG/M2

## 2025-02-24 DIAGNOSIS — Z34.93 ENCOUNTER FOR SUPERVISION OF NORMAL PREGNANCY IN THIRD TRIMESTER, UNSPECIFIED GRAVIDITY (HCC): Primary | ICD-10-CM

## 2025-02-24 LAB
BILIRUBIN: NEGATIVE
GLUCOSE (URINE DIPSTICK): NEGATIVE MG/DL
KETONES (URINE DIPSTICK): NEGATIVE MG/DL
MULTISTIX LOT#: ABNORMAL NUMERIC
NITRITE, URINE: NEGATIVE
OCCULT BLOOD: NEGATIVE
PH, URINE: 6 (ref 4.5–8)
PROTEIN (URINE DIPSTICK): NEGATIVE MG/DL
SPECIFIC GRAVITY: 1 (ref 1–1.03)
UROBILINOGEN,SEMI-QN: 0.2 MG/DL (ref 0–1.9)

## 2025-02-24 PROCEDURE — 99213 OFFICE O/P EST LOW 20 MIN: CPT | Performed by: OBSTETRICS & GYNECOLOGY

## 2025-02-24 PROCEDURE — 81002 URINALYSIS NONAUTO W/O SCOPE: CPT | Performed by: OBSTETRICS & GYNECOLOGY

## 2025-02-25 ENCOUNTER — HOSPITAL ENCOUNTER (OUTPATIENT)
Dept: PERINATAL CARE | Facility: HOSPITAL | Age: 45
Discharge: HOME | End: 2025-02-25
Attending: OBSTETRICS & GYNECOLOGY
Payer: MEDICAID

## 2025-02-25 ENCOUNTER — NST DOCUMENTATION (OUTPATIENT)
Dept: OBGYN CLINIC | Facility: CLINIC | Age: 45
End: 2025-02-25

## 2025-02-25 DIAGNOSIS — O09.529 ANTEPARTUM MULTIGRAVIDA OF ADVANCED MATERNAL AGE (HCC): Primary | ICD-10-CM

## 2025-02-25 PROCEDURE — 59025 FETAL NON-STRESS TEST: CPT

## 2025-02-25 PROCEDURE — 59025 FETAL NON-STRESS TEST: CPT | Performed by: OBSTETRICS & GYNECOLOGY

## 2025-02-25 NOTE — NST
Nonstress Test   Patient: Sabino Manning    Gestation: 37w2d    Diagnosis from order:   AMA    Problem List:   Patient Active Problem List   Diagnosis    Anxiety and depression    Mild intermittent asthma with acute exacerbation ([facility])    Previous  section x2    Antepartum multigravida of advanced maternal age ([facility])    Rh negative, antepartum ([facility])       NST:        2025   NST DOCUMENTATION   Variability 6-25 BPM   Accelerations Yes   Decelerations None   Baseline 135 BPM   Uterine Irritability No   Contractions Irregular   Acoustic Stimulator No   Nonstress Test Interpretation Reactive   NST Completed by [place] RIOS   Disposition Home    Testing Plan Weekly NST   Provider Notified Madelin BEE         I agree with the above evaluation. NST completed.  Jenna Yusuf MD  2025  2:00 PM

## 2025-02-25 NOTE — PROGRESS NOTES
Language Line- Sofie- [id number].     See Prenatal Vitals flowsheet for documentation of vitals / urine / exam. See problem list -- updated in detail.   Used 20-29 min in order to do detailed chart review & review of blood work / ultrasound reports / MFM reports, reviewing history, performing prenatal exam / pelvic exam, counseling pt on vaccines, prenatal education, ordering tests or meds, ordering MFM /specialist referrals, documentation in EMR, interpretation / communication of results & care coordination.   No S/S of labor. She is doing weekly testing / BPP. We discussed pre-op instructions.

## 2025-02-26 ENCOUNTER — TELEPHONE (OUTPATIENT)
Dept: OBGYN CLINIC | Facility: CLINIC | Age: 45
End: 2025-02-26

## 2025-02-26 ENCOUNTER — HOSPITAL ENCOUNTER (INPATIENT)
Facility: HOSPITAL | Age: 45
LOS: 3 days | Discharge: HOME | End: 2025-03-01
Attending: OBSTETRICS & GYNECOLOGY | Admitting: OBSTETRICS & GYNECOLOGY
Payer: MEDICAID

## 2025-02-26 ENCOUNTER — ANESTHESIA EVENT (OUTPATIENT)
Dept: OBGYN UNIT | Facility: HOSPITAL | Age: 45
End: 2025-02-26
Payer: MEDICAID

## 2025-02-26 ENCOUNTER — ANESTHESIA (OUTPATIENT)
Dept: OBGYN UNIT | Facility: HOSPITAL | Age: 45
End: 2025-02-26
Payer: MEDICAID

## 2025-02-26 DIAGNOSIS — Z98.891 PREVIOUS CESAREAN SECTION: Primary | ICD-10-CM

## 2025-02-26 PROBLEM — O34.219 PREVIOUS CESAREAN SECTION COMPLICATING PREGNANCY (HCC): Status: ACTIVE | Noted: 2025-02-26

## 2025-02-26 LAB
ANTIBODY SCREEN: POSITIVE
BASOPHILS # BLD AUTO: 0.05 X10(3) UL (ref 0–0.2)
BASOPHILS NFR BLD AUTO: 0.6 %
DEPRECATED RDW RBC AUTO: 45.7 FL (ref 35.1–46.3)
DIRECT COOMBS POLY: NEGATIVE
EOSINOPHIL # BLD AUTO: 0.09 X10(3) UL (ref 0–0.7)
EOSINOPHIL NFR BLD AUTO: 1 %
ERYTHROCYTE [DISTWIDTH] IN BLOOD BY AUTOMATED COUNT: 14 % (ref 11–15)
FETAL SCREEN RESULT: NEGATIVE
HCT VFR BLD AUTO: 36.4 %
HGB BLD-MCNC: 12.9 G/DL
IMM GRANULOCYTES # BLD AUTO: 0.02 X10(3) UL (ref 0–1)
IMM GRANULOCYTES NFR BLD: 0.2 %
LYMPHOCYTES # BLD AUTO: 1.4 X10(3) UL (ref 1–4)
LYMPHOCYTES NFR BLD AUTO: 16.3 %
MCH RBC QN AUTO: 31.5 PG (ref 26–34)
MCHC RBC AUTO-ENTMCNC: 35.4 G/DL (ref 31–37)
MCV RBC AUTO: 89 FL
MONOCYTES # BLD AUTO: 0.73 X10(3) UL (ref 0.1–1)
MONOCYTES NFR BLD AUTO: 8.5 %
NEUTROPHILS # BLD AUTO: 6.32 X10 (3) UL (ref 1.5–7.7)
NEUTROPHILS # BLD AUTO: 6.32 X10(3) UL (ref 1.5–7.7)
NEUTROPHILS NFR BLD AUTO: 73.4 %
PLATELET # BLD AUTO: 270 10(3)UL (ref 150–450)
RBC # BLD AUTO: 4.09 X10(6)UL
RH BLOOD TYPE: NEGATIVE
T PALLIDUM AB SER QL IA: NONREACTIVE
WBC # BLD AUTO: 8.6 X10(3) UL (ref 4–11)

## 2025-02-26 PROCEDURE — 86870 RBC ANTIBODY IDENTIFICATION: CPT | Performed by: OBSTETRICS & GYNECOLOGY

## 2025-02-26 PROCEDURE — 88302 TISSUE EXAM BY PATHOLOGIST: CPT | Performed by: OBSTETRICS & GYNECOLOGY

## 2025-02-26 PROCEDURE — 85461 HEMOGLOBIN FETAL: CPT | Performed by: OBSTETRICS & GYNECOLOGY

## 2025-02-26 PROCEDURE — 86901 BLOOD TYPING SEROLOGIC RH(D): CPT | Performed by: OBSTETRICS & GYNECOLOGY

## 2025-02-26 PROCEDURE — 0UB70ZZ EXCISION OF BILATERAL FALLOPIAN TUBES, OPEN APPROACH: ICD-10-PCS | Performed by: OBSTETRICS & GYNECOLOGY

## 2025-02-26 PROCEDURE — 86850 RBC ANTIBODY SCREEN: CPT | Performed by: OBSTETRICS & GYNECOLOGY

## 2025-02-26 PROCEDURE — 85025 COMPLETE CBC W/AUTO DIFF WBC: CPT | Performed by: OBSTETRICS & GYNECOLOGY

## 2025-02-26 PROCEDURE — 99214 OFFICE O/P EST MOD 30 MIN: CPT

## 2025-02-26 PROCEDURE — 86780 TREPONEMA PALLIDUM: CPT | Performed by: OBSTETRICS & GYNECOLOGY

## 2025-02-26 PROCEDURE — 86077 PHYS BLOOD BANK SERV XMATCH: CPT | Performed by: OBSTETRICS & GYNECOLOGY

## 2025-02-26 PROCEDURE — S0028 INJECTION, FAMOTIDINE, 20 MG: HCPCS | Performed by: OBSTETRICS & GYNECOLOGY

## 2025-02-26 PROCEDURE — 86880 COOMBS TEST DIRECT: CPT | Performed by: OBSTETRICS & GYNECOLOGY

## 2025-02-26 PROCEDURE — 86900 BLOOD TYPING SEROLOGIC ABO: CPT | Performed by: OBSTETRICS & GYNECOLOGY

## 2025-02-26 RX ORDER — DIPHENHYDRAMINE HYDROCHLORIDE 50 MG/ML
12.5 INJECTION INTRAMUSCULAR; INTRAVENOUS EVERY 4 HOURS PRN
Status: ACTIVE | OUTPATIENT
Start: 2025-02-26 | End: 2025-02-27

## 2025-02-26 RX ORDER — SODIUM CHLORIDE, SODIUM LACTATE, POTASSIUM CHLORIDE, CALCIUM CHLORIDE 600; 310; 30; 20 MG/100ML; MG/100ML; MG/100ML; MG/100ML
125 INJECTION, SOLUTION INTRAVENOUS CONTINUOUS
Status: DISCONTINUED | OUTPATIENT
Start: 2025-02-26 | End: 2025-02-26 | Stop reason: HOSPADM

## 2025-02-26 RX ORDER — ONDANSETRON 2 MG/ML
4 INJECTION INTRAMUSCULAR; INTRAVENOUS EVERY 6 HOURS PRN
Status: DISCONTINUED | OUTPATIENT
Start: 2025-02-26 | End: 2025-03-01

## 2025-02-26 RX ORDER — ONDANSETRON 2 MG/ML
4 INJECTION INTRAMUSCULAR; INTRAVENOUS EVERY 6 HOURS PRN
Status: DISCONTINUED | OUTPATIENT
Start: 2025-02-26 | End: 2025-02-26 | Stop reason: HOSPADM

## 2025-02-26 RX ORDER — CHOLECALCIFEROL (VITAMIN D3) 25 MCG
1 TABLET,CHEWABLE ORAL DAILY
Status: DISCONTINUED | OUTPATIENT
Start: 2025-02-26 | End: 2025-03-01

## 2025-02-26 RX ORDER — METOCLOPRAMIDE 10 MG/1
10 TABLET ORAL ONCE
Status: COMPLETED | OUTPATIENT
Start: 2025-02-26 | End: 2025-02-26

## 2025-02-26 RX ORDER — HYDROCODONE BITARTRATE AND ACETAMINOPHEN 7.5; 325 MG/1; MG/1
1 TABLET ORAL EVERY 6 HOURS PRN
Status: DISPENSED | OUTPATIENT
Start: 2025-02-26 | End: 2025-02-27

## 2025-02-26 RX ORDER — LIDOCAINE HYDROCHLORIDE 10 MG/ML
INJECTION, SOLUTION INFILTRATION; PERINEURAL
Status: COMPLETED | OUTPATIENT
Start: 2025-02-26 | End: 2025-02-26

## 2025-02-26 RX ORDER — FAMOTIDINE 10 MG/ML
20 INJECTION, SOLUTION INTRAVENOUS ONCE
Status: COMPLETED | OUTPATIENT
Start: 2025-02-26 | End: 2025-02-26

## 2025-02-26 RX ORDER — BUPIVACAINE HYDROCHLORIDE 7.5 MG/ML
INJECTION, SOLUTION INTRASPINAL
Status: COMPLETED | OUTPATIENT
Start: 2025-02-26 | End: 2025-02-26

## 2025-02-26 RX ORDER — ACETAMINOPHEN 500 MG
1000 TABLET ORAL EVERY 6 HOURS
Status: DISCONTINUED | OUTPATIENT
Start: 2025-02-26 | End: 2025-03-01

## 2025-02-26 RX ORDER — ONDANSETRON 2 MG/ML
INJECTION INTRAMUSCULAR; INTRAVENOUS AS NEEDED
Status: DISCONTINUED | OUTPATIENT
Start: 2025-02-26 | End: 2025-02-26 | Stop reason: SURG

## 2025-02-26 RX ORDER — HYDROCODONE BITARTRATE AND ACETAMINOPHEN 7.5; 325 MG/1; MG/1
2 TABLET ORAL EVERY 6 HOURS PRN
Status: ACTIVE | OUTPATIENT
Start: 2025-02-26 | End: 2025-02-27

## 2025-02-26 RX ORDER — GABAPENTIN 300 MG/1
300 CAPSULE ORAL EVERY 8 HOURS PRN
Status: DISCONTINUED | OUTPATIENT
Start: 2025-02-26 | End: 2025-03-01

## 2025-02-26 RX ORDER — METOCLOPRAMIDE HYDROCHLORIDE 5 MG/ML
10 INJECTION INTRAMUSCULAR; INTRAVENOUS ONCE
Status: COMPLETED | OUTPATIENT
Start: 2025-02-26 | End: 2025-02-26

## 2025-02-26 RX ORDER — ACETAMINOPHEN 325 MG/1
650 TABLET ORAL EVERY 6 HOURS PRN
Status: ACTIVE | OUTPATIENT
Start: 2025-02-26 | End: 2025-02-27

## 2025-02-26 RX ORDER — HALOPERIDOL 5 MG/ML
0.5 INJECTION INTRAMUSCULAR ONCE AS NEEDED
Status: ACTIVE | OUTPATIENT
Start: 2025-02-26 | End: 2025-02-26

## 2025-02-26 RX ORDER — PHENYLEPHRINE HCL 10 MG/ML
VIAL (ML) INJECTION AS NEEDED
Status: DISCONTINUED | OUTPATIENT
Start: 2025-02-26 | End: 2025-02-26 | Stop reason: SURG

## 2025-02-26 RX ORDER — DIPHENHYDRAMINE HCL 25 MG
25 CAPSULE ORAL EVERY 4 HOURS PRN
Status: ACTIVE | OUTPATIENT
Start: 2025-02-26 | End: 2025-02-27

## 2025-02-26 RX ORDER — OXYCODONE HYDROCHLORIDE 5 MG/1
5 TABLET ORAL EVERY 6 HOURS PRN
Status: DISCONTINUED | OUTPATIENT
Start: 2025-02-26 | End: 2025-03-01

## 2025-02-26 RX ORDER — CITRIC ACID/SODIUM CITRATE 334-500MG
30 SOLUTION, ORAL ORAL ONCE
Status: COMPLETED | OUTPATIENT
Start: 2025-02-26 | End: 2025-02-26

## 2025-02-26 RX ORDER — HYDROMORPHONE HYDROCHLORIDE 1 MG/ML
0.6 INJECTION, SOLUTION INTRAMUSCULAR; INTRAVENOUS; SUBCUTANEOUS
Status: ACTIVE | OUTPATIENT
Start: 2025-02-26 | End: 2025-02-27

## 2025-02-26 RX ORDER — ONDANSETRON 2 MG/ML
4 INJECTION INTRAMUSCULAR; INTRAVENOUS ONCE AS NEEDED
Status: ACTIVE | OUTPATIENT
Start: 2025-02-26 | End: 2025-02-26

## 2025-02-26 RX ORDER — NALBUPHINE HYDROCHLORIDE 10 MG/ML
2.5 INJECTION INTRAMUSCULAR; INTRAVENOUS; SUBCUTANEOUS EVERY 4 HOURS PRN
Status: ACTIVE | OUTPATIENT
Start: 2025-02-26 | End: 2025-02-27

## 2025-02-26 RX ORDER — DOCUSATE SODIUM 100 MG/1
100 CAPSULE, LIQUID FILLED ORAL
Status: DISCONTINUED | OUTPATIENT
Start: 2025-02-26 | End: 2025-03-01

## 2025-02-26 RX ORDER — SODIUM CHLORIDE, SODIUM LACTATE, POTASSIUM CHLORIDE, CALCIUM CHLORIDE 600; 310; 30; 20 MG/100ML; MG/100ML; MG/100ML; MG/100ML
INJECTION, SOLUTION INTRAVENOUS CONTINUOUS
Status: DISCONTINUED | OUTPATIENT
Start: 2025-02-26 | End: 2025-03-01

## 2025-02-26 RX ORDER — TRANEXAMIC ACID 10 MG/ML
1000 INJECTION, SOLUTION INTRAVENOUS ONCE AS NEEDED
Status: ACTIVE | OUTPATIENT
Start: 2025-02-26 | End: 2025-02-26

## 2025-02-26 RX ORDER — BISACODYL 10 MG
10 SUPPOSITORY, RECTAL RECTAL ONCE AS NEEDED
Status: DISCONTINUED | OUTPATIENT
Start: 2025-02-26 | End: 2025-03-01

## 2025-02-26 RX ORDER — SIMETHICONE 80 MG
80 TABLET,CHEWABLE ORAL 3 TIMES DAILY PRN
Status: DISCONTINUED | OUTPATIENT
Start: 2025-02-26 | End: 2025-03-01

## 2025-02-26 RX ORDER — KETOROLAC TROMETHAMINE 30 MG/ML
30 INJECTION, SOLUTION INTRAMUSCULAR; INTRAVENOUS EVERY 6 HOURS
Status: DISPENSED | OUTPATIENT
Start: 2025-02-26 | End: 2025-02-27

## 2025-02-26 RX ORDER — NALBUPHINE HYDROCHLORIDE 10 MG/ML
2.5 INJECTION INTRAMUSCULAR; INTRAVENOUS; SUBCUTANEOUS
Status: DISCONTINUED | OUTPATIENT
Start: 2025-02-26 | End: 2025-03-01

## 2025-02-26 RX ORDER — ACETAMINOPHEN 500 MG
1000 TABLET ORAL ONCE
Status: COMPLETED | OUTPATIENT
Start: 2025-02-26 | End: 2025-02-26

## 2025-02-26 RX ORDER — PROCHLORPERAZINE EDISYLATE 5 MG/ML
5 INJECTION INTRAMUSCULAR; INTRAVENOUS ONCE AS NEEDED
Status: ACTIVE | OUTPATIENT
Start: 2025-02-26 | End: 2025-02-26

## 2025-02-26 RX ORDER — HYDROMORPHONE HYDROCHLORIDE 1 MG/ML
0.4 INJECTION, SOLUTION INTRAMUSCULAR; INTRAVENOUS; SUBCUTANEOUS
Status: ACTIVE | OUTPATIENT
Start: 2025-02-26 | End: 2025-02-27

## 2025-02-26 RX ORDER — EPHEDRINE SULFATE 50 MG/ML
INJECTION INTRAVENOUS AS NEEDED
Status: DISCONTINUED | OUTPATIENT
Start: 2025-02-26 | End: 2025-02-26 | Stop reason: SURG

## 2025-02-26 RX ORDER — NALOXONE HYDROCHLORIDE 0.4 MG/ML
0.08 INJECTION, SOLUTION INTRAMUSCULAR; INTRAVENOUS; SUBCUTANEOUS
Status: ACTIVE | OUTPATIENT
Start: 2025-02-26 | End: 2025-02-27

## 2025-02-26 RX ORDER — FAMOTIDINE 20 MG/1
20 TABLET, FILM COATED ORAL ONCE
Status: COMPLETED | OUTPATIENT
Start: 2025-02-26 | End: 2025-02-26

## 2025-02-26 RX ORDER — MORPHINE SULFATE 1 MG/ML
INJECTION, SOLUTION EPIDURAL; INTRATHECAL; INTRAVENOUS
Status: COMPLETED | OUTPATIENT
Start: 2025-02-26 | End: 2025-02-26

## 2025-02-26 RX ORDER — AMMONIA 15 % (W/V)
0.3 AMPUL (EA) INHALATION AS NEEDED
Status: DISCONTINUED | OUTPATIENT
Start: 2025-02-26 | End: 2025-03-01

## 2025-02-26 RX ORDER — DEXTROSE, SODIUM CHLORIDE, SODIUM LACTATE, POTASSIUM CHLORIDE, AND CALCIUM CHLORIDE 5; .6; .31; .03; .02 G/100ML; G/100ML; G/100ML; G/100ML; G/100ML
INJECTION, SOLUTION INTRAVENOUS CONTINUOUS
Status: DISCONTINUED | OUTPATIENT
Start: 2025-02-26 | End: 2025-03-01

## 2025-02-26 RX ADMIN — SODIUM CHLORIDE, SODIUM LACTATE, POTASSIUM CHLORIDE, CALCIUM CHLORIDE: 600; 310; 30; 20 INJECTION, SOLUTION INTRAVENOUS at 11:24:00

## 2025-02-26 RX ADMIN — ONDANSETRON 4 MG: 2 INJECTION INTRAMUSCULAR; INTRAVENOUS at 10:55:00

## 2025-02-26 RX ADMIN — MORPHINE SULFATE 0.2 MG: 1 INJECTION, SOLUTION EPIDURAL; INTRATHECAL; INTRAVENOUS at 11:03:00

## 2025-02-26 RX ADMIN — EPHEDRINE SULFATE 5 MG: 50 INJECTION INTRAVENOUS at 11:20:00

## 2025-02-26 RX ADMIN — SODIUM CHLORIDE, SODIUM LACTATE, POTASSIUM CHLORIDE, CALCIUM CHLORIDE: 600; 310; 30; 20 INJECTION, SOLUTION INTRAVENOUS at 10:53:00

## 2025-02-26 RX ADMIN — PHENYLEPHRINE HCL 100 MCG: 10 MG/ML VIAL (ML) INJECTION at 11:10:00

## 2025-02-26 RX ADMIN — BUPIVACAINE HYDROCHLORIDE 1.3 ML: 7.5 INJECTION, SOLUTION INTRASPINAL at 11:03:00

## 2025-02-26 RX ADMIN — LIDOCAINE HYDROCHLORIDE 5 ML: 10 INJECTION, SOLUTION INFILTRATION; PERINEURAL at 10:58:00

## 2025-02-26 RX ADMIN — PHENYLEPHRINE HCL 100 MCG: 10 MG/ML VIAL (ML) INJECTION at 11:14:00

## 2025-02-26 RX ADMIN — PHENYLEPHRINE HCL 100 MCG: 10 MG/ML VIAL (ML) INJECTION at 11:30:00

## 2025-02-26 NOTE — PROGRESS NOTES
Pt is a 44 year old female admitted to TR3/TR3-A.     Chief Complaint   Patient presents with    R/o Rom     Leaking of fluid at 0635, pt reported contractions started on the way to hospital    R/o Labor      Pt is  37w3d intra-uterine pregnancy.  History obtained, consents signed. Oriented to room, staff, and plan of care.

## 2025-02-26 NOTE — TELEPHONE ENCOUNTER
To surgery scheduling to cancel scheduled .     RN- hold to make sure patient scheduled 6 week postpartum.

## 2025-02-26 NOTE — PROGRESS NOTES
Transferred Mother to room   349    via bed, accompanied by S.O., in stable condition.   Report given to    Jaymie RIOS.   Bed in locked and low position, side rails up x 2, ID's checked and verified, call light with in reach, reinforced pt to call for assistance when needs  to go to the bathroom.

## 2025-02-26 NOTE — ANESTHESIA PROCEDURE NOTES
Spinal Block    Date/Time: 2/26/2025 10:58 AM    Performed by: Lisa Whalen MD  Authorized by: Lisa Whalen MD      General Information and Staff    Start Time:  2/26/2025 10:58 AM  End Time:  2/26/2025 11:03 AM  Anesthesiologist:  Lisa Whalen MD  Performed by:  Anesthesiologist  Reason for Block: surgical anesthesia    Preanesthetic Checklist: patient identified, IV checked, risks and benefits discussed, monitors and equipment checked, pre-op evaluation, timeout performed, anesthesia consent and sterile technique used      Procedure Details    Patient Position:  Sitting  Prep: ChloraPrep    Monitoring:  Cardiac monitor  Approach:  Midline  Location:  L3-4  Injection Technique:  Single-shot    Needle    Needle Type:  Pencil-tip  Needle Gauge:  24 G  Needle Length:  3.5 in    Assessment    Sensory Level:  T4  Events: clear CSF and CSF aspirated      Additional Comments

## 2025-02-26 NOTE — PROGRESS NOTES
[facility]  part of [facility]     Section Delivery / Brief Operative Note    Sabino Manning Patient Status:  Inpatient    1980 MRN [id number]   Location [facility] 3SE Attending Gallo Bhakta DO   Hosp Day # 0 PCP Loni Beck MD     Pre Op Diagnosis:  IUP at 37 wks, previous  section, request for sterilization, latent labor and SROM    Post Op Diagnosis:  same as pre-op    Procedure:  repeat  LTCS with bilateral salpingectomy    Surgeon:  Gallo Bhakta DO    Assistant Surgeon:  Dr Li     Anesthesia: spinal by Dr Whalen    Complications: none    Antibiotics:  Ancef 2 grams preoperatively and Azithromycin 500 mg intra-operatively    QBL: 564 ml    Findings: normal uterus, normal tubes bilaterally, normal ovaries bilaterally. Live male infant, Apgars 9 & 9, weight 7 lbs, 6 oz delivered at 1121 in OSWALD position. Nuchal Cord:  none  clear amniotic fluid noted.    Sponge and Needle Counts:  Verified    Dictation # [id number]      Gallo Bhakta DO  2025  5:12 PM

## 2025-02-26 NOTE — PLAN OF CARE
Problem: BIRTH - VAGINAL/ SECTION  Goal: Fetal and maternal status remain reassuring during the birth process  Description: INTERVENTIONS:  - Monitor vital signs  - Monitor fetal heart rate  - Monitor uterine activity  - Monitor labor progression (vaginal delivery)  - DVT prophylaxis (C/S delivery)  - Surgical antibiotic prophylaxis (C/S delivery)  Outcome: Completed     Problem: PAIN - ADULT  Goal: Verbalizes/displays adequate comfort level or patient's stated pain goal  Description: INTERVENTIONS:  - Encourage pt to monitor pain and request assistance  - Assess pain using appropriate pain scale  - Administer analgesics based on type and severity of pain and evaluate response  - Implement non-pharmacological measures as appropriate and evaluate response  - Consider cultural and social influences on pain and pain management  - Manage/alleviate anxiety  - Utilize distraction and/or relaxation techniques  - Monitor for opioid side effects  - Notify MD/LIP if interventions unsuccessful or patient reports new pain  - Anticipate increased pain with activity and pre-medicate as appropriate  Outcome: Completed     Problem: ANXIETY  Goal: Will report anxiety at manageable levels  Description: INTERVENTIONS:  - Administer medication as ordered  - Teach and rehearse alternative coping skills  - Provide emotional support with 1:1 interaction with staff  Outcome: Completed     Problem: Patient Centered Care  Goal: Patient preferences are identified and integrated in the patient's plan of care  Description: Interventions:  - What would you like us to know as we care for you? Baby #3  - Provide timely, complete, and accurate information to patient/family  - Incorporate patient and family knowledge, values, beliefs, and cultural backgrounds into the planning and delivery of care  - Encourage patient/family to participate in care and decision-making at the level they choose  - Honor patient and family perspectives and  choices  Outcome: Progressing     Problem: Patient/Family Goals  Goal: Patient/Family Long Term Goal  Description: Patient's Long Term Goal: uncomplicated     Interventions:  - See additional Care Plan goals for specific interventions  Outcome: Progressing  Goal: Patient/Family Short Term Goal  Description: Patient's Short Term Goal: pain control    Interventions:   - spinal anesthesia   - See additional Care Plan goals for specific interventions  Outcome: Progressing

## 2025-02-26 NOTE — ANESTHESIA POSTPROCEDURE EVALUATION
Patient: Sabino Manning    Procedure Summary       Date: 25 Room / Location: [facility] L+D OR  [facility] L+D OR    Anesthesia Start: 1052 Anesthesia Stop: 1200    Procedures:        SECTION with BILATERAL SALPINGECTOMY (Abdomen)      BILATERAL SALPINGECTOMY (Abdomen) Diagnosis: (natasha 3/16 repeat )    Surgeons: Gallo Bhakta DO Anesthesiologist: Lisa Whalen MD    Anesthesia Type: spinal ASA Status: 2 - Emergent            Anesthesia Type: spinal    Vitals Value Taken Time   /66 25 1215   Temp  25 1218   Pulse 101 25 1218   Resp 24 25 1218   SpO2 97 % 25 1218   Vitals shown include unfiled device data.    [facility] AN Post Evaluation:   Patient Evaluated in PACU  Patient Participation: complete - patient participated  Level of Consciousness: awake and alert  Pain Score: 0  Pain Management: adequate  Airway Patency:patent  Dental exam unchanged from preop  Yes    Nausea/Vomiting: none  Cardiovascular Status: acceptable  Respiratory Status: acceptable  Postoperative Hydration acceptable      Lisa Whalen MD  2025 12:18 PM

## 2025-02-26 NOTE — H&P
[facility]  part of [facility]    History & Physical    Sabino Manning Patient Status:  Outpatient    1980 MRN [id number]   Location [facility] Attending Gallo Bhakta,    Hosp Day # 0 PCP Loni Beck MD     Date of Admission:  2025      HPI:   Sabino Manning is a 44 year old  female, current EGA of 37w3d with an estimated date of delivery of: 3/16/2025, by Ultrasound      Sabino Manning is being admitted for  with salpingectomy. I signed IDPA 30 day form with her on 2025.     Her current obstetrical history is significant for AMA, two previous C-sections and Rh negative. She has scheduled procedure on  with Dr Fragoso.   Patient reports contractions since early AM .  She reports SROM at about 0630.   Fetal Movement: good    History   Obstetric History:   OB History    Para Term  AB Living   3 2 2 0 0 2   SAB IAB Ectopic Multiple Live Births   0 0 0 0 2      # Outcome Date GA Lbr Almas/2nd Weight Sex Type Anes PTL Lv   3 Current            2 Term 18 40w0d   F Caesarean   DANNY   1 Term 17 40w2d 54:30 / 06:50 8 lb 3.4 oz (3.725 kg) M CS-LTranv EPI N DANNY      Birth Comments: Time of Delivery:6:50pm  Mother's age:34  Maternal Blood Type:B-     Hearing Test:Passed bilateral   CCHD:passed  Bili T: 3.7 on 17 @0541      Complications: Other - see comments, Meconium, Arrest of dilatation     Past Medical History:   Past Medical History:    Asthma (HCC)    Hx of tuberculosis    Ulcer     Past Surgerical History:   Past Surgical History:   Procedure Laterality Date          x2     Social History:   Social History     Tobacco Use    Smoking status: Never    Smokeless tobacco: Never   Substance Use Topics    Alcohol use: No     Alcohol/week: 0.0 standard drinks of alcohol        Allergies/Medications:   Allergies:   Allergies[1]  Medications:  Prescriptions Prior to Admission[2]    Review of Systems:   As  documented in HPI      Physical Exam:        Constitutional: alert and cooperative in moderate distress  Abdomen: gravid nontender  Vaginal exam:  Dilation: 3 cm    Effacement: 70 %    Station: -2    Position: Cephalic    RN exam    FHT assessment:   Baseline: 130 bpm   Variability: moderate   Accels:  present   Decels: No   Tocos:  contractions every 6 minute   Category: 1 tracing    Results:     Drawn and pending       Assessment/Plan:     Previous  section x2        Rh negative, antepartum (HCC)           Request for sterilization         Early latent labor.    Treatment Plan:  Intervention: Repeat  section with bilateral salpingectomy. The procedure with it's indications, risks and complications was discussed. These include but are not limited to: bleeding, infection, injury and VTE. Any of these could require further medical and / or surgical therapy. We discussed prophylactic measures as well. Questions answered and consent obtained.     Language Line: in [phone] for full discussion      Gallo Bhakta DO  2025  9:14 AM         [1]   Allergies  Allergen Reactions    Ibuprofen OTHER (SEE COMMENTS)     Stomach discomfort    [2]   Medications Prior to Admission   Medication Sig Dispense Refill Last Dose/Taking    [] potassium chloride (KLOR-CON M20) 20 MEQ Oral Tab CR Take 2 tablets (40 mEq total) by mouth once for 1 dose. 2 tablet 0     albuterol 108 (90 Base) MCG/ACT Inhalation Aero Soln Inhale 2 puffs into the lungs every 4 (four) hours as needed. 54 g 3     Prenatal 27-0.8 MG Oral Tab Take 1 tablet by mouth daily.       Ferrous Gluconate-C-Folic Acid (IRON-C OR) Take by mouth.       Cholecalciferol (VITAMIN D3) 1.25 MG (48350 UT) Oral Cap Take 1 capsule by mouth every 14 (fourteen) days. (Patient not taking: Reported on 2025)       Fluticasone-Salmeterol (AIRDUO RESPICLICK 113/) 113-14 MCG/ACT Inhalation Aerosol Powder, Breath Activated Inhale 1 puff into the lungs 2  (two) times a day. 1 each 5

## 2025-02-26 NOTE — ANESTHESIA PREPROCEDURE EVALUATION
Anesthesia PreOp Note    HPI:     Sabino Manning is a 44 year old female who presents for preoperative consultation requested by: Gallo Bhakta DO    Date of Surgery: 2025    Procedure(s):   SECTION with BILATERAL SALPINGECTOMY  BILATERAL SALPINGECTOMY  Indication: natasha 3/16 repeat     Relevant Problems   No relevant active problems       NPO:        Last Solid Consumption Date: 25  Last Solid Consumption Time:              History Review:  Patient Active Problem List    Diagnosis Date Noted    Previous  section complicating pregnancy (HCC) 2025    Previous  section x2 2024    Antepartum multigravida of advanced maternal age (HCC) 2024    Rh negative, antepartum ([facility]) 2024    Mild intermittent asthma with acute exacerbation ([facility]) 2023    Anxiety and depression 2021       Past Medical History:    Asthma ([facility])    Hx of tuberculosis    Ulcer       Past Surgical History:   Procedure Laterality Date          x2       Prescriptions Prior to Admission[1]  Current Medications and Prescriptions Ordered in Epic[2]    Allergies[3]    Family History   Problem Relation Age of Onset    Heart Disease Father     Other (Other) Mother         Vertigo    Breast Cancer Neg     Ovarian Cancer Neg      Social History     Socioeconomic History    Marital status:    Tobacco Use    Smoking status: Never    Smokeless tobacco: Never   Substance and Sexual Activity    Alcohol use: No     Alcohol/week: 0.0 standard drinks of alcohol    Drug use: No    Sexual activity: Yes     Partners: Male   Other Topics Concern    Caffeine Concern Yes     Comment: tea one cup       Available pre-op labs reviewed.  Lab Results   Component Value Date    WBC 8.6 2025    RBC 4.09 2025    HGB 12.9 2025    HCT 36.4 2025    MCV 89.0 2025    MCH 31.5 2025    MCHC 35.4 2025    RDW 14.0 2025    .0 2025     Lab  Results   Component Value Date     2025    K 3.4 (L) 2025     2025    CO2 22.0 2025    BUN <5 (L) 2025    CREATSERUM 0.47 (L) 2025    GLU 84 2025    CA 9.2 2025          Vital Signs:  There is no height or weight on file to calculate BMI.   oral temperature is 98.4 °F (36.9 °C). Her blood pressure is 105/66 and her pulse is 85. Her respiration is 18.   Vitals:    25 0915   BP: 105/66   Pulse: 85   Resp: 18   Temp: 98.4 °F (36.9 °C)   TempSrc: Oral        Anesthesia Evaluation     Patient summary reviewed and Nursing notes reviewed    No history of anesthetic complications   Airway   Mallampati: II  TM distance: <3 FB  Neck ROM: full  Dental - Dentition appears grossly intact     Pulmonary - normal exam   (+) asthma  Cardiovascular - normal exam    Neuro/Psych      GI/Hepatic/Renal      Endo/Other    Abdominal                  Anesthesia Plan:   ASA:  2  Emergent    Plan:   Spinal and MAC  Post-op Pain Management: Intrathecal narcotics  Informed Consent Plan and Risks Discussed With:  Patient      I have informed Sabino Manning and/or legal guardian or family member of the nature of the anesthetic plan, benefits, risks including possible dental damage if relevant, major complications, and any alternative forms of anesthetic management.   All of the patient's questions were answered to the best of my ability. The patient desires the anesthetic management as planned.  Lisa Whalen MD  2025 10:22 AM  Present on Admission:   Rh negative, antepartum (HCC)   Previous  section complicating pregnancy (HCC)           [1]   Medications Prior to Admission   Medication Sig Dispense Refill Last Dose/Taking    Prenatal 27-0.8 MG Oral Tab Take 1 tablet by mouth daily.   2025 Morning    Ferrous Gluconate-C-Folic Acid (IRON-C OR) Take by mouth.   2025 Morning    [] potassium chloride (KLOR-CON M20) 20 MEQ Oral Tab CR Take 2 tablets (40 mEq  total) by mouth once for 1 dose. 2 tablet 0     albuterol 108 (90 Base) MCG/ACT Inhalation Aero Soln Inhale 2 puffs into the lungs every 4 (four) hours as needed. 54 g 3 More than a month    Cholecalciferol (VITAMIN D3) 1.25 MG (49193 UT) Oral Cap Take 1 capsule by mouth every 14 (fourteen) days. (Patient not taking: Reported on 1/29/2025)       Fluticasone-Salmeterol (AIRDUO RESPICLICK 113/14) 113-14 MCG/ACT Inhalation Aerosol Powder, Breath Activated Inhale 1 puff into the lungs 2 (two) times a day. 1 each 5    [2]   Current Facility-Administered Medications Ordered in Epic   Medication Dose Route Frequency Provider Last Rate Last Admin    lactated ringers infusion  125 mL/hr Intravenous Continuous Gallo Bhakta DO        ondansetron (Zofran) 4 MG/2ML injection 4 mg  4 mg Intravenous Q6H PRN Gallo Bhakta DO        sodium citrate-citric acid (Bicitra) 500-334 MG/5ML oral solution 30 mL  30 mL Oral Once Gallo Bhakta,         oxyTOCIN in sodium chloride 0.9% (Pitocin) 30 Units/500mL infusion premix  62.5-900 georgina-units/min Intravenous Continuous Gallo Bhakta DO        famotidine (Pepcid) tab 20 mg  20 mg Oral Once Gallo Bhakta, DO        Or    famotidine (Pepcid) 20 mg/2mL injection 20 mg  20 mg Intravenous Once Gallo Bhakta,         metoclopramide (Reglan) tab 10 mg  10 mg Oral Once Gallo Bhakta, DO        Or    metoclopramide (Reglan) 5 mg/mL injection 10 mg  10 mg Intravenous Once Gallo Bhakta A, DO        ceFAZolin (Ancef) 2g in 10mL IV syringe premix  2 g Intravenous Once Gallo Bhakta,         azithromycin (Zithromax) 500 mg in sodium chloride 0.9% 250mL IVPB premix  500 mg Intravenous Once (Intra-Op) Gallo Bhakta DO         No current [facility]-ordered outpatient medications on file.   [3]   Allergies  Allergen Reactions    Ibuprofen OTHER (SEE COMMENTS)     Stomach discomfort

## 2025-02-26 NOTE — TELEPHONE ENCOUNTER
Delivered today by . Was scheduled on . Please cancel appointments. Please cancel for Dr Fragoso and whoever was to assist. See me at 6 weeks but you will need Barb[place] language line. Thanks

## 2025-02-27 ENCOUNTER — APPOINTMENT (OUTPATIENT)
Dept: ULTRASOUND IMAGING | Facility: HOSPITAL | Age: 45
End: 2025-02-27
Attending: OBSTETRICS & GYNECOLOGY
Payer: MEDICAID

## 2025-02-27 LAB
BASOPHILS # BLD AUTO: 0.05 X10(3) UL (ref 0–0.2)
BASOPHILS NFR BLD AUTO: 0.5 %
DEPRECATED RDW RBC AUTO: 47.8 FL (ref 35.1–46.3)
EOSINOPHIL # BLD AUTO: 0.09 X10(3) UL (ref 0–0.7)
EOSINOPHIL NFR BLD AUTO: 0.9 %
ERYTHROCYTE [DISTWIDTH] IN BLOOD BY AUTOMATED COUNT: 14.3 % (ref 11–15)
HCT VFR BLD AUTO: 25.1 %
HGB BLD-MCNC: 8.7 G/DL
IMM GRANULOCYTES # BLD AUTO: 0.04 X10(3) UL (ref 0–1)
IMM GRANULOCYTES NFR BLD: 0.4 %
LYMPHOCYTES # BLD AUTO: 1.58 X10(3) UL (ref 1–4)
LYMPHOCYTES NFR BLD AUTO: 15.2 %
MCH RBC QN AUTO: 32 PG (ref 26–34)
MCHC RBC AUTO-ENTMCNC: 34.7 G/DL (ref 31–37)
MCV RBC AUTO: 92.3 FL
MONOCYTES # BLD AUTO: 0.72 X10(3) UL (ref 0.1–1)
MONOCYTES NFR BLD AUTO: 6.9 %
NEUTROPHILS # BLD AUTO: 7.93 X10 (3) UL (ref 1.5–7.7)
NEUTROPHILS # BLD AUTO: 7.93 X10(3) UL (ref 1.5–7.7)
NEUTROPHILS NFR BLD AUTO: 76.1 %
PLATELET # BLD AUTO: 207 10(3)UL (ref 150–450)
RBC # BLD AUTO: 2.72 X10(6)UL
WBC # BLD AUTO: 10.4 X10(3) UL (ref 4–11)

## 2025-02-27 PROCEDURE — 93971 EXTREMITY STUDY: CPT | Performed by: OBSTETRICS & GYNECOLOGY

## 2025-02-27 PROCEDURE — 85025 COMPLETE CBC W/AUTO DIFF WBC: CPT | Performed by: OBSTETRICS & GYNECOLOGY

## 2025-02-27 NOTE — CM/SW NOTE
SW met with patient at bedside.  SW confirmed face sheet contact as correct.    Baby boy/girl name:Luis  Date & time of delivery: 2025 at 11:21 AM   Delivery method:  section  Siblings age: 1 year and 7 years    Patient employed: yes  Length of maternity leave: 2 months    Father of baby employed: yes  Length of paternity leave: 2 months    Breast or formula feed: breast    Pediatrician: [facility]    Infant Insurance: [employer]  Change HC contacted:    Mental Health History: Denies    Medications: Denies    Therapist: Connie    Psychiatrist: Connie    SW discussed signs, symptoms and risks associated with post partum depression & anxiety.  SW provided pt with PMAD resources.  Other resources provided: [facility] [employer] infromation flyer, Mom line, Mood boost    Patient support system: FOB and family    Patient denied current questions/needs from [facility].    SW to remain available for support and/or discharge planning.    Jenni Benson MSW, LSW   X [phone]

## 2025-02-27 NOTE — ANESTHESIA POST-OP FOLLOW-UP NOTE
Post Duramorph and Block Rounds    Sabino Manning is a postoperative day [unfilled] for  SECTION with BILATERAL SALPINGECTOMY on @[facility]@.    Time of block placement: 1058    Sabino Manning complained of LUE numbness that was present before delivery and now worse. Dr Mayorga aware of the problem.  Will monitor her progress. She's moving her legs without any problems.. Denies any n/v an itching. She rates her pain at 0 out of 10.  There are no signs or symptoms of systemic local anesthetic toxicity.      Vitals: [unfilled]  Temp (24hrs), Av.8 °F (36.6 °C), Min:97.4 °F (36.3 °C), Max:98.4 °F (36.9 °C)      Labs: @ANLASTLAB(WBC:3,PLT:3,INR:3,aPTT:3)@       Assessment and Plan: Sabino Manning's block has resolved.. Plan transition to oral pain medications ordered by surgeon. No further anesthesia follow up is required.  
English

## 2025-02-27 NOTE — OPERATIVE REPORT
[facility]    PATIENT'S NAME: LUIS FERNANDO PERRY   ATTENDING PHYSICIAN: Gallo Mayorga DO   OPERATING PHYSICIAN: Gallo Mayorga DO   PATIENT ACCOUNT#:   [id number]    LOCATION:  [address] [place] [address] [facility]  MEDICAL RECORD #:   [id number]       YOB: 1980  ADMISSION DATE:       2025      OPERATION DATE:  2025    OPERATIVE REPORT      PREOPERATIVE DIAGNOSIS:  Pregnancy at 37 weeks with history of 2 previous  sections, request for sterilization, latent phase labor and spontaneous rupture of membranes.  POSTOPERATIVE DIAGNOSIS:  Pregnancy at 37 weeks with history of 2 previous  sections, request for sterilization, latent phase labor and spontaneous rupture of membranes, with delivery of a viable male infant.  PROCEDURE:  Repeat low transverse  section and bilateral salpingectomy.    ASSISTANT SURGEON:  Ayala Li MD    ANESTHESIA:  Spinal, by Dr. Whalen.    QUANTITATIVE BLOOD LOSS:  564 mL.    COMPLICATIONS:  None apparent.    PATHOLOGY SPECIMEN:  Bilateral tubes.    FINDINGS:  There was normal uterus, tubes, and ovaries noted.  A viable male infant was delivered weighing 7 pounds 6 ounces with Apgar scores of 9 and 9 at one and five minutes, respectively.    OPERATIVE TECHNIQUE:  After consent was obtained, the patient was taken to the operating suite where a spinal anesthetic was administered, and she was prepped and draped in the usual fashion.  Next, a Pfannenstiel incision was made above the previous scar as her scar was literally at the edge of the pubic symphysis.  This incision was carried down to the rectus fascia which was incised and carried to the limits of the skin incision.  Fascia was sharply and bluntly dissected away from the muscles which were split at the midline exposing the peritoneum.  The peritoneum was entered sharply and extended bluntly.  The vesicouterine peritoneal reflection had some scar tissue to the lower segment, and we chose to go well  above this with a transverse incision.  This was carried down to the fetal membranes which were incised showing clear fluid.  The uterine incision was extended bluntly.  Fetal head was palpated in right occiput anterior position and delivered with the aid of fundal pressure.  Oropharyngeal and nasal secretions were aspirated with a bulb syringe.  The remainder of the baby was delivered with fundal pressure.  Baby was vigorous on delivery, and we did 30-second delayed cord clamp based on  recommendation.  The cord was then doubly clamped, cut, and the infant transferred to the  staff.  A segment of cord was isolated for cord pH determination, and this was followed by routine cord blood sample.  The placenta was manually extracted.  The uterine cavity was swabbed of any excess clot and membranous fragments until clear.  The uterus was closed with a running lock suture of #1 chromic.  Good hemostasis was observed.  Next, we switched attention to the salpingectomy.  The left tube was isolated and carried out to its fimbriated end.  It was removed using the handheld LigaSure device.  No bleeding was noted.  The process was repeated on the right fallopian tube with identical results.  We then examined the incision again.  There was a scant area of bleeding just at the midportion and this was ligated with a single figure-of-eight suture of #1 chromic.  Good hemostasis was observed.  We then irrigated and swabbed the cul-de-sac as well as the paracolic gutters until clear.  The uterine incision was examined again with no bleeding noted.  All initial counts had been noted to be correct.  The rectus musculature was examined with no bleeding noted, and the rectus fascia was closed with a running suture of 0 Vicryl.  Subcutaneous tissue was irrigated.  We had good hemostasis here, and it was closed with a running subcutaneous suture of 2-0 plain gut.  This was followed by skin closure with a running subcuticular  suture of 4-0 Monocryl.  All final counts were noted to be correct.  The incision was covered with Dermabond and Tegaderm bandage.  The patient was then taken to postanesthesia care unit in stable condition.     Dictated By Gallo Mayorga DO  d: 02/26/2025 17:10:46  t: 02/26/2025 19:21:12  [facility] [id number]  [facility]/    cc: DO Ayala Kahn MD

## 2025-02-27 NOTE — PROGRESS NOTES
Post-Partum Note   2025, 11:15 PM  LATE ENTRY FROM 2100    Subjective:  Called by RN re: patient complaint of LUE pain since delivery. Patient asked for her sister to act as  rather than language line.  I asked specific questions in attempt to clarify her complaint. She had uncomplicated RLTCS with salpingectomy. Only monitor was pressure cuff on LUE. Her complaint was immediate post op. In further questioning she was c/o hand paresthesia for past 3-4 weeks.  She has not yet ambulated and denies lightheadedness. Rice is present Yes.  The patient is bottle feeding  Patient had infant boy.     Objective:  Vitals:    25 1400 25 1433 25 1630 25   BP: 93/64 103/63 (!) 86/59 91/62   BP Location:  Right arm Right arm Right arm   Pulse: 97 91 83 69   Resp: 17 20 16 16   Temp:  97.4 °F (36.3 °C) 97.8 °F (36.6 °C) 97.6 °F (36.4 °C)   TempSrc:  Oral Oral Oral   SpO2: 98%  96% 97%   Weight:           Intake/Output Summary (Last 24 hours) at 2025 2315  Last data filed at 2025  Gross per 24 hour   Intake 1650 ml   Output 1664 ml   Net -14 ml         PE:  LUE without edema. I had asked RN to switch from heat to cold pack prior to my arrival. She refuses to  hand or flex fingers individually. Upon further questioning, it is not due to pain or weakness but simply numbness. Full strength with hand flexion/ extension, elbow and arm flex / extend / adduction / abduction.     Data:   Recent Labs     25  0921   WBC 8.6   HGB 12.9   HCT 36.4       Assessment/Plan:  44 year oldyo  , s/p  with labor, PPD# 0      Disposition: I discussed through sister that I don't believe the patient can distinguish paresthesia from true pain. She actually agrees. Therefore I reassured patient and we'll simply observe. I would expect her symptoms to improve over days to 2 weeks. She did report she may have a wrist brace at home and I asked her sister to have someone bring  it to [facility]. Lastly we discussed her reported stomach upset with PO Motrin. Initially her sister reported an ulcer history but she never had endoscopy. Therefore I am OK with 24 hours of Toradol but will not use the PO Motrin.     Gallo Bhakta, DO 2/26/2025 11:15 PM

## 2025-02-27 NOTE — PROGRESS NOTES
[facility]  part of [facility]    OB/Gyne Post  Section Progress Note      Sabino Manning Patient Status:  Inpatient    1980 MRN [id number]   Location [facility] 3SE Attending Gallo Bhakta, DO   Hosp Day # 1 PCP Loni Beck MD       Subjective     Good pain control. Tolerating present diet. Still on bedrest. Still with williamson. Lochia light.  Flatus: not yet.     Ongoing numbness in left hand and patient very uncomfortable.  States it has been getting worse over the last 3 weeks.  Patient flapping her left arm in the air, thinking it will help relieve her numbness.      She denies fevers, chills, headache, blurry vision, chest pain, SOB, RUQ pain, n/v, dizziness upon ambulation nor leg pain.     Objective   Vital signs in last 24 hours:  Temp:  [97.4 °F (36.3 °C)-98.1 °F (36.7 °C)] 98 °F (36.7 °C)  Pulse:  [] 67  Resp:  [13-22] 18  BP: ()/() 85/56  SpO2:  [94 %-100 %] 98 %    Input/Output:    Intake/Output Summary (Last 24 hours) at 2025 1044  Last data filed at 2025 0745  Gross per 24 hour   Intake 1650 ml   Output 2239 ml   Net -589 ml       Constitutional: comfortable  Abdomen: soft nontender  Uterus: fundus below umbilicus, non tender  Wound/Incision:  Clean / dry / intact  Extremities: No calf tenderness, SCDs on while in bed, neg homans      Results:   Labs / Path / Radiology:    Recent Results (from the past 24 hours)   Fetal Screen    Collection Time: 25 12:43 PM   Result Value Ref Range    Fetal Screen Result Negative    CBC With Differential With Platelet    Collection Time: 25  6:17 AM   Result Value Ref Range    WBC 10.4 4.0 - 11.0 x10(3) uL    RBC 2.72 (L) 3.80 - 5.30 x10(6)uL    HGB 8.7 (L) 12.0 - 16.0 g/dL    HCT 25.1 (L) 35.0 - 48.0 %    MCV 92.3 80.0 - 100.0 fL    MCH 32.0 26.0 - 34.0 pg    MCHC 34.7 31.0 - 37.0 g/dL    RDW-SD 47.8 (H) 35.1 - 46.3 fL    RDW 14.3 11.0 - 15.0 %    .0 150.0 - 450.0 10(3)uL     Neutrophil Absolute Prelim 7.93 (H) 1.50 - 7.70 x10 (3) uL    Neutrophil Absolute 7.93 (H) 1.50 - 7.70 x10(3) uL    Lymphocyte Absolute 1.58 1.00 - 4.00 x10(3) uL    Monocyte Absolute 0.72 0.10 - 1.00 x10(3) uL    Eosinophil Absolute 0.09 0.00 - 0.70 x10(3) uL    Basophil Absolute 0.05 0.00 - 0.20 x10(3) uL    Immature Granulocyte Absolute 0.04 0.00 - 1.00 x10(3) uL    Neutrophil % 76.1 %    Lymphocyte % 15.2 %    Monocyte % 6.9 %    Eosinophil % 0.9 %    Basophil % 0.5 %    Immature Granulocyte % 0.4 %   Rh Immune Globulin (Product Only) Once    Collection Time: 25  6:33 AM   Result Value Ref Range    DERIVATIVE CODE RHG     DERIVATIVE LOT [id number]     UNIT ABO      Product Status Issued          No results found.      Assessment/Plan   POD# 1 with following issues:   Patient Active Problem List   Diagnosis    Anxiety and depression    Mild intermittent asthma with acute exacerbation (HCC)    Previous  section x2    Antepartum multigravida of advanced maternal age (HCC)    Rh negative, antepartum (HCC)    Previous  section complicating pregnancy (HCC)   .    ambulate, d/c williamson, advance diet  Check LUE doppler to r/o DVT; suspect carpal tunnel but patient very uncomfortable    Pt/spouse wants circumcision for their son.  Risks of procedure including bleeding, infection, injury, cosmetic displeasure and need for additional procedures all discussed.  Pt voices understanding and agrees to proceed.  Consents signed.          Ike Garcia DO  2025  10:44 AM

## 2025-02-28 RX ORDER — FAMOTIDINE 20 MG/1
20 TABLET, FILM COATED ORAL 2 TIMES DAILY PRN
Status: DISCONTINUED | OUTPATIENT
Start: 2025-02-28 | End: 2025-03-01

## 2025-02-28 NOTE — PROGRESS NOTES
[facility]  part of [facility]    OB/Gyne Post  Section Progress Note      Sabino Manning Patient Status:  Inpatient    1980 MRN [id number]   Location [facility] 3SE Attending Gallo Bhakta, DO   Hosp Day # 2 PCP Loni Beck MD       Subjective     Good pain control. Tolerating present diet. Ambulating well. Voiding freely.    Objective   Vital signs in last 24 hours:  Vitals:    25 0745 25 0930 25 2036 25 0830   BP: (!) 85/56 (!) 82/54 96/68 95/64   Pulse: 67 71 69 83   Resp: 18 18 16 16   Temp: 98 °F (36.7 °C)  98.3 °F (36.8 °C) 98.6 °F (37 °C)   TempSrc: Oral  Oral Oral   SpO2: 98%      Weight:            Input/Output:    Intake/Output Summary (Last 24 hours) at 2025 1413  Last data filed at 2025 1816  Gross per 24 hour   Intake --   Output 1400 ml   Net -1400 ml       Constitutional: comfortable  Abdomen: soft nontender  Uterus: fundus below umbilicus, appropriately tender  Wound/Incision:  Clean / dry / intact  Extremities: No calf tenderness, SCDs on while in bed, No pitting edema.      Results:   Labs / Path / Radiology:    Recent Results (from the past 24 hours)   Rh Immune Globulin (Product Only) Once    Collection Time: 25  1:15 AM   Result Value Ref Range    DERIVATIVE CODE RHG     DERIVATIVE LOT [id number]     UNIT ABO      Product Status Presumed Transfused          US VENOUS DOPPLER ARM LEFT - DIAG IMG (CPT=93971)    Result Date: 2025  CONCLUSION: No evidence of left upper extremity DVT.   Dictated by (CST): Toni Leary MD on 2025 at 5:11 PM     Finalized by (CST): Toni Leary MD on 2025 at 5:12 PM             Assessment/Plan   POD# 2 with following issues:   Patient Active Problem List   Diagnosis    Anxiety and depression    Mild intermittent asthma with acute exacerbation (HCC)    Previous  section x2    Antepartum multigravida of advanced maternal age (HCC)    Rh negative, antepartum  (HCC)    Previous  section complicating pregnancy (HCC)   .    CPM, ambulate      Yumiko Holt MD  2025  2:13 PM

## 2025-03-01 VITALS
BODY MASS INDEX: 24 KG/M2 | OXYGEN SATURATION: 98 % | WEIGHT: 137 LBS | HEART RATE: 79 BPM | TEMPERATURE: 98 F | RESPIRATION RATE: 16 BRPM | SYSTOLIC BLOOD PRESSURE: 99 MMHG | DIASTOLIC BLOOD PRESSURE: 70 MMHG

## 2025-03-01 RX ORDER — HYDROCODONE BITARTRATE AND ACETAMINOPHEN 5; 325 MG/1; MG/1
1 TABLET ORAL EVERY 8 HOURS PRN
Qty: 6 TABLET | Refills: 0 | Status: SHIPPED | OUTPATIENT
Start: 2025-03-01

## 2025-03-01 RX ORDER — GABAPENTIN 300 MG/1
300 CAPSULE ORAL EVERY 8 HOURS PRN
Qty: 30 CAPSULE | Refills: 0 | Status: SHIPPED | OUTPATIENT
Start: 2025-03-01

## 2025-03-01 NOTE — DISCHARGE SUMMARY
[facility]  part of [facility]    Discharge Summary    Sabino Manning Patient Status:  Inpatient    1980 MRN [id number]   Location [facility] 3SE Attending Gallo Bhakta, DO   Hosp Day # 3       Admission date:  2025    Delivering OB Clinician: Yony    EDC: Estimated Date of Delivery: 3/16/25    Gestational Age: 37w3d    Antepartum complications:   Patient Active Problem List   Diagnosis    Anxiety and depression    Mild intermittent asthma with acute exacerbation (HCC)    Previous  section x2    Antepartum multigravida of advanced maternal age (HCC)    Rh negative, antepartum (HCC)    Previous  section complicating pregnancy (HCC)       Date of Delivery: 2025 Time of Delivery: 11:21 AM    Delivery Type: repeat  section, low transverse incision    Baby: Liveborn male   Information for the patient's :  Nu, Boy [[id number]]   7 lb 5.5 oz (3.33 kg)  Apgars:  1 minute: 9  5 minutes: 910 minutes:       Intrapartum Complications: None    Discharge Date: 3/1/2025    Hospital Course: patient admitted with SROM,latent labor with h/o prev c/s x2.  Repeat c/s performed without complications.  No complications. Routine delivery and postpartum care    Discharge Physical Exam:   BP 99/64 (BP Location: Right arm)   Pulse 77   Temp 98.3 °F (36.8 °C) (Oral)   Resp 17   Wt 137 lb (62.1 kg)   LMP 2024 (Approximate)   SpO2 98%   Breastfeeding Yes   BMI 24.27 kg/m²   General appearance:  alert, appears stated age, cooperative and no distress  Abdominal: soft, non-tender, no rebound  Uterus: firm, nontender, below umbilicus  Incision: clean, dry and intact  Pelvic: deferred  Extremities: Homans sign is negative, no sign of DVT    Discharged Condition: stable      Disposition: home      Plan:     Follow-up appointment in 6 weeks with Dr. Bhakta        3/1/2025  7:43 AM

## 2025-03-01 NOTE — PROGRESS NOTES
This RN received in report that patient is Tongan speaking, but does understand and speak some english. This RN offered and recommended use of  services for assessments and education, patient declined.

## 2025-03-01 NOTE — PROGRESS NOTES
Discharge order received from MD.     Patient is primarily Vietnamese speaking, this RN offered and recommended use of  service for discharge education, patient declined multiple times. Discharge instructions and medications reviewed with patient in detail. ID band matched with baby band. Follow up instructions with OB given. Mother verbalizes understanding of instructions and is in stable condition. Mom discharged home with infant.

## 2025-03-01 NOTE — DISCHARGE INSTRUCTIONS
What to Expect:  Abdominal cramping after delivery especially if you are breastfeeding.   Vaginal bleeding for about 4-6 weeks that may be followed by a yellow or white discharge for a few more weeks.  Your period will resume in approximately 6-8 weeks, unless you are breastfeeding.    If you are bottle feeding, you may notice breast engorgement in about 3 days.  Your breast may be sore and hard. Please wear a tight fitted bra or sports bra for 24-36 hours to help prevent your breast from producing milk, and use ice packs to relive any discomfort.  If you are breastfeeding, nipple dryness is very common the first few days.    Constipation is common after having a baby.  Please increase fluid and fiber in your diet.       Over-The-Counter Medication  Non-prescription anti-inflammatory medications can also help to ease the pain.  You may take Aleve, Tylenol or Ibuprofen   Colace or Metamucil for Constipation  Lanolin for dry nipples  Tucks, Witch Hazel and Epifoam for vaginal/perineum discomfort.   Drink a full glass of water with oral medication and take as directed.     Bathing/Showers  You may resume showers  No baths, swimming, hot tubs until your post-partum visit     Home Medication  Resume your home medications as instructed     Diet  Resume your normal diet     Activity  Refrain from vaginal intercourse, vaginal suppositories, tampon use or douches until after your post-partum visit.  No exercising for 4 weeks  You may climb stairs minimally for the 1st week.    Do not do heavy housework for at least 2-3 weeks     Return to Work or School  You may return to work in approximately 6 weeks  Contact your obstetrician’s office, if you need a medical release.      Driving  Avoid driving if you are taking narcotics for pain relief.     Follow-up Appointment with Your Obstetrician  Call your obstetrician’s office today for an appointment in 6 weeks.    Verify your appointment date, day, time, and location.  At your  1st post-partum office visit:  Your progress will be evaluated, findings reviewed, and any additional concerns and instructions will be discussed.     Questions or Concerns  Call your obstetrician’s office if you experience the following:  Severe pain not controlled by pain medication  Foul smelling vaginal discharge  Heavy bleeding  Shortness of breath  Fever  Crying and periods of sadness that prevents you from caring for yourself and your baby  Burning sensation during urination or inability to urinate  Swelling, redness or abnormal warmth to your leg/calf  If your call is made after office hours, a physician will be available to help you.  There is always a provider covering our patients.

## 2025-03-11 ENCOUNTER — TELEPHONE (OUTPATIENT)
Dept: OBGYN CLINIC | Facility: CLINIC | Age: 45
End: 2025-03-11

## 2025-03-11 NOTE — TELEPHONE ENCOUNTER
----- Message from Gallo Bhakta sent at 3/9/2025  8:59 PM CDT -----  I will need patient's postpartum visit to be in a 20 minute slot. There is a huge communication barrier even with language line. Thank you.

## 2025-03-15 NOTE — TELEPHONE ENCOUNTER
Called Sabino Manning with [facility] (ID [id number]).  Assisted to reschedule to 4/11/25 for 20 min slot. Patient verbalized understanding.

## 2025-03-18 ENCOUNTER — OFFICE VISIT (OUTPATIENT)
Dept: FAMILY MEDICINE CLINIC | Facility: CLINIC | Age: 45
End: 2025-03-18

## 2025-03-18 VITALS
OXYGEN SATURATION: 97 % | HEIGHT: 63 IN | HEART RATE: 78 BPM | WEIGHT: 124 LBS | DIASTOLIC BLOOD PRESSURE: 68 MMHG | SYSTOLIC BLOOD PRESSURE: 96 MMHG | BODY MASS INDEX: 21.97 KG/M2

## 2025-03-18 DIAGNOSIS — M79.602 LEFT ARM PAIN: Primary | ICD-10-CM

## 2025-03-18 DIAGNOSIS — R20.0 BILATERAL HAND NUMBNESS: ICD-10-CM

## 2025-03-18 DIAGNOSIS — R53.1 WEAKNESS: ICD-10-CM

## 2025-03-18 PROBLEM — O26.899 RH NEGATIVE, ANTEPARTUM (HCC): Status: RESOLVED | Noted: 2024-09-01 | Resolved: 2025-03-18

## 2025-03-18 PROBLEM — Z67.91 RH NEGATIVE, ANTEPARTUM (HCC): Status: RESOLVED | Noted: 2024-09-01 | Resolved: 2025-03-18

## 2025-03-18 PROBLEM — O09.529 ANTEPARTUM MULTIGRAVIDA OF ADVANCED MATERNAL AGE (HCC): Status: RESOLVED | Noted: 2024-09-01 | Resolved: 2025-03-18

## 2025-03-18 PROBLEM — Z98.891 PREVIOUS CESAREAN SECTION: Status: RESOLVED | Noted: 2024-09-01 | Resolved: 2025-03-18

## 2025-03-18 PROBLEM — O34.219 PREVIOUS CESAREAN SECTION COMPLICATING PREGNANCY (HCC): Status: RESOLVED | Noted: 2025-02-26 | Resolved: 2025-03-18

## 2025-03-18 PROCEDURE — 99214 OFFICE O/P EST MOD 30 MIN: CPT | Performed by: FAMILY MEDICINE

## 2025-03-18 RX ORDER — OMEPRAZOLE 20 MG/1
CAPSULE, DELAYED RELEASE ORAL
COMMUNITY
Start: 2024-09-23

## 2025-03-18 NOTE — PROGRESS NOTES
HPI:    Patient ID: Sabino Manning is a 44 year old female.      Swelling  Associated symptoms include arthralgias, numbness and weakness. Pertinent negatives include no chills or fever.       Chief Complaint   Patient presents with    Swelling     And numbness in left hand a month before she had her baby states her gyne had said it would go away but pain its still there also pain in whole left arm also gets dizzy on and off        Wt Readings from Last 6 Encounters:   03/18/25 124 lb (56.2 kg)   02/26/25 137 lb (62.1 kg)   02/24/25 137 lb 3.2 oz (62.2 kg)   02/18/25 137 lb (62.1 kg)   02/11/25 135 lb 3.2 oz (61.3 kg)   01/29/25 133 lb 6.4 oz (60.5 kg)     BP Readings from Last 3 Encounters:   03/18/25 96/68   03/01/25 99/70   02/24/25 (!) 87/54     Had a c section 3 weeks ago.  2/26/25    Had symptoms for numbness both hands 1 month prior to delivery.  Was told by her ob symptoms would resolve    Taking prenatal vitamins,  and iron.  Feels weak.  Tearful  Weakness in hands  Feels she can't hold baby  Declined any antidepressants        lives in [facility]  He is currently here  Lives in [place] by herself and 3 kids and elderly parents      Review of Systems   Constitutional:  Negative for chills and fever.   Musculoskeletal:  Positive for arthralgias.   Neurological:  Positive for dizziness, weakness and numbness.   Psychiatric/Behavioral:  Positive for behavioral problems. Negative for self-injury and suicidal ideas. The patient is nervous/anxious.        BP 96/68   Pulse 78   Ht 5' 3\" (1.6 m)   Wt 124 lb (56.2 kg)   LMP 06/12/2024 (Approximate)   SpO2 97%   Breastfeeding No   BMI 21.97 kg/m²          Current Outpatient Medications   Medication Sig Dispense Refill    HYDROcodone-acetaminophen 5-325 MG Oral Tab Take 1 tablet by mouth every 8 (eight) hours as needed for Pain. 6 tablet 0    albuterol 108 (90 Base) MCG/ACT Inhalation Aero Soln Inhale 2 puffs into the lungs every 4 (four) hours as needed. 54 g 3     Prenatal 27-0.8 MG Oral Tab Take 1 tablet by mouth daily.      Ferrous Gluconate-C-Folic Acid (IRON-C OR) Take by mouth.      Fluticasone-Salmeterol (AIRDUO RESPICLICK 113/14) 113-14 MCG/ACT Inhalation Aerosol Powder, Breath Activated Inhale 1 puff into the lungs 2 (two) times a day. 1 each 5    omeprazole 20 MG Oral Capsule Delayed Release  (Patient not taking: Reported on 3/18/2025)      Cholecalciferol (VITAMIN D3) 1.25 MG (74852 UT) Oral Cap Take 1 capsule by mouth every 14 (fourteen) days. (Patient not taking: Reported on 3/18/2025)       Allergies:Allergies[1]   PHYSICAL EXAM:     Chief Complaint   Patient presents with    Swelling     And numbness in left hand a month before she had her baby states her gyne had said it would go away but pain its still there also pain in whole left arm also gets dizzy on and off       Physical Exam  Vitals reviewed.   Cardiovascular:      Pulses: Normal pulses.      Heart sounds: Normal heart sounds.   Pulmonary:      Effort: Pulmonary effort is normal.      Breath sounds: Normal breath sounds.   Abdominal:      Comments: Wearing belt, post partum    Musculoskeletal:         General: No swelling, deformity or signs of injury.      Left wrist: Tenderness present. Decreased range of motion.      Right hand: Decreased strength.      Left hand: Decreased strength.   Neurological:      Mental Status: She is alert.                ASSESSMENT/PLAN:     Encounter Diagnoses   Name Primary?    Left arm pain Yes    Bilateral hand numbness     Weakness     Postpartum mood disorder (HCC)        1. Left arm pain  Suspect carpal tunnel  Complete emg and to see Occupational Therapy   - Occupational Therapy Referral - [facility]    2. Bilateral hand numbness  As above  - EMG  - Occupational Therapy Referral - [facility]    3. Weakness  Check labs  Continue prenatal vitamins   - CBC With Differential With Platelet; Future  - Comp Metabolic Panel (14); Future  - TSH W Reflex To  Free T4; Future    4. Postpartum mood disorder (HCC)  Discussed meds/ therapy  Patient states she is not interested in meds/ therapy at this time   Feels has support currently as  is here       Orders Placed This Encounter   Procedures    CBC With Differential With Platelet    Comp Metabolic Panel (14)    TSH W Reflex To Free T4         The above note was creating using Dragon speech recognition technology. Please excuse any typos    Meds This Visit:  Requested Prescriptions      No prescriptions requested or ordered in this encounter       Imaging & Referrals:  OCCUPATIONAL THERAPY - INTERNAL       ID#[id number]       [1]   Allergies  Allergen Reactions    Ibuprofen OTHER (SEE COMMENTS)     Stomach discomfort

## 2025-03-19 ENCOUNTER — LAB ENCOUNTER (OUTPATIENT)
Dept: LAB | Facility: HOSPITAL | Age: 45
End: 2025-03-19
Attending: FAMILY MEDICINE
Payer: MEDICAID

## 2025-03-19 ENCOUNTER — TELEPHONE (OUTPATIENT)
Dept: OBGYN UNIT | Facility: HOSPITAL | Age: 45
End: 2025-03-19

## 2025-03-19 DIAGNOSIS — Z34.83 ENCOUNTER FOR SUPERVISION OF OTHER NORMAL PREGNANCY IN THIRD TRIMESTER (HCC): ICD-10-CM

## 2025-03-19 DIAGNOSIS — R53.1 WEAKNESS: ICD-10-CM

## 2025-03-19 LAB
ALBUMIN SERPL-MCNC: 4.2 G/DL (ref 3.2–4.8)
ALBUMIN/GLOB SERPL: 1.5 {RATIO} (ref 1–2)
ALP LIVER SERPL-CCNC: 104 U/L
ALT SERPL-CCNC: 18 U/L
ANION GAP SERPL CALC-SCNC: 8 MMOL/L (ref 0–18)
AST SERPL-CCNC: 20 U/L (ref ?–34)
BASOPHILS # BLD AUTO: 0.05 X10(3) UL (ref 0–0.2)
BASOPHILS NFR BLD AUTO: 0.8 %
BILIRUB SERPL-MCNC: 0.4 MG/DL (ref 0.3–1.2)
BILIRUB UR QL: NEGATIVE
BUN BLD-MCNC: 9 MG/DL (ref 9–23)
BUN/CREAT SERPL: 13.8 (ref 10–20)
CALCIUM BLD-MCNC: 9.2 MG/DL (ref 8.7–10.4)
CHLORIDE SERPL-SCNC: 105 MMOL/L (ref 98–112)
CLARITY UR: CLEAR
CO2 SERPL-SCNC: 25 MMOL/L (ref 21–32)
CREAT BLD-MCNC: 0.65 MG/DL
DEPRECATED RDW RBC AUTO: 47 FL (ref 35.1–46.3)
EGFRCR SERPLBLD CKD-EPI 2021: 113 ML/MIN/1.73M2 (ref 60–?)
EOSINOPHIL # BLD AUTO: 0.15 X10(3) UL (ref 0–0.7)
EOSINOPHIL NFR BLD AUTO: 2.5 %
ERYTHROCYTE [DISTWIDTH] IN BLOOD BY AUTOMATED COUNT: 14 % (ref 11–15)
FASTING STATUS PATIENT QL REPORTED: YES
GLOBULIN PLAS-MCNC: 2.8 G/DL (ref 2–3.5)
GLUCOSE BLD-MCNC: 94 MG/DL (ref 70–99)
GLUCOSE UR-MCNC: NORMAL MG/DL
HCT VFR BLD AUTO: 35.8 %
HGB BLD-MCNC: 11.9 G/DL
IMM GRANULOCYTES # BLD AUTO: 0.04 X10(3) UL (ref 0–1)
IMM GRANULOCYTES NFR BLD: 0.7 %
KETONES UR-MCNC: NEGATIVE MG/DL
LEUKOCYTE ESTERASE UR QL STRIP.AUTO: NEGATIVE
LYMPHOCYTES # BLD AUTO: 1.97 X10(3) UL (ref 1–4)
LYMPHOCYTES NFR BLD AUTO: 32.2 %
MCH RBC QN AUTO: 30.1 PG (ref 26–34)
MCHC RBC AUTO-ENTMCNC: 33.2 G/DL (ref 31–37)
MCV RBC AUTO: 90.6 FL
MONOCYTES # BLD AUTO: 0.44 X10(3) UL (ref 0.1–1)
MONOCYTES NFR BLD AUTO: 7.2 %
NEUTROPHILS # BLD AUTO: 3.46 X10 (3) UL (ref 1.5–7.7)
NEUTROPHILS # BLD AUTO: 3.46 X10(3) UL (ref 1.5–7.7)
NEUTROPHILS NFR BLD AUTO: 56.6 %
NITRITE UR QL STRIP.AUTO: NEGATIVE
OSMOLALITY SERPL CALC.SUM OF ELEC: 284 MOSM/KG (ref 275–295)
PH UR: 6.5 [PH] (ref 5–8)
PLATELET # BLD AUTO: 402 10(3)UL (ref 150–450)
POTASSIUM SERPL-SCNC: 3.9 MMOL/L (ref 3.5–5.1)
PROT SERPL-MCNC: 7 G/DL (ref 5.7–8.2)
PROT UR-MCNC: NEGATIVE MG/DL
RBC # BLD AUTO: 3.95 X10(6)UL
SODIUM SERPL-SCNC: 138 MMOL/L (ref 136–145)
SP GR UR STRIP: 1.01 (ref 1–1.03)
TSI SER-ACNC: 1.27 UIU/ML (ref 0.55–4.78)
UROBILINOGEN UR STRIP-ACNC: NORMAL
WBC # BLD AUTO: 6.1 X10(3) UL (ref 4–11)

## 2025-03-19 PROCEDURE — 84443 ASSAY THYROID STIM HORMONE: CPT

## 2025-03-19 PROCEDURE — 80053 COMPREHEN METABOLIC PANEL: CPT

## 2025-03-19 PROCEDURE — 85025 COMPLETE CBC W/AUTO DIFF WBC: CPT

## 2025-03-19 PROCEDURE — 86901 BLOOD TYPING SEROLOGIC RH(D): CPT

## 2025-03-19 PROCEDURE — 36415 COLL VENOUS BLD VENIPUNCTURE: CPT

## 2025-03-19 PROCEDURE — 81001 URINALYSIS AUTO W/SCOPE: CPT

## 2025-03-19 PROCEDURE — 86850 RBC ANTIBODY SCREEN: CPT

## 2025-03-19 PROCEDURE — 86900 BLOOD TYPING SEROLOGIC ABO: CPT

## 2025-03-20 ENCOUNTER — TELEPHONE (OUTPATIENT)
Dept: OBGYN UNIT | Facility: HOSPITAL | Age: 45
End: 2025-03-20

## 2025-04-02 ENCOUNTER — APPOINTMENT (OUTPATIENT)
Dept: OCCUPATIONAL MEDICINE | Facility: HOSPITAL | Age: 45
End: 2025-04-02
Attending: FAMILY MEDICINE
Payer: MEDICAID

## 2025-04-07 ENCOUNTER — OFFICE VISIT (OUTPATIENT)
Dept: OCCUPATIONAL MEDICINE | Facility: HOSPITAL | Age: 45
End: 2025-04-07
Attending: FAMILY MEDICINE
Payer: MEDICAID

## 2025-04-07 DIAGNOSIS — R20.0 BILATERAL HAND NUMBNESS: ICD-10-CM

## 2025-04-07 DIAGNOSIS — M79.602 LEFT ARM PAIN: Primary | ICD-10-CM

## 2025-04-07 PROCEDURE — 97110 THERAPEUTIC EXERCISES: CPT

## 2025-04-07 PROCEDURE — 97166 OT EVAL MOD COMPLEX 45 MIN: CPT

## 2025-04-10 ENCOUNTER — APPOINTMENT (OUTPATIENT)
Dept: OCCUPATIONAL MEDICINE | Facility: HOSPITAL | Age: 45
End: 2025-04-10
Attending: FAMILY MEDICINE
Payer: MEDICAID

## 2025-04-10 NOTE — PROGRESS NOTES
OT UE EVALUATION:     Diagnosis:   Left arm pain (M79.602)  Bilateral hand numbness (R20.0) Patient:  Sabino Manning (44 year old, female)        Referring Provider: Loni Beck  Today's Date   4/10/2025    Precautions:  None   Date of Evaluation: 25  Next MD visit: No data recorded  Date of Injury: No data recorded  Date of Surgery: No data recorded     PATIENT SUMMARY   Summary of chief complaints: bilateral hand pain , weakness and numbness  History of current condition: swelling And numbness in left hand a month before she had her baby states her gyne had said it would go away but pain its still there also pain in whole left arm also gets dizzy on and off  Reason for Visit History   Pain level: current 8 /10, at best  , at worst 8 /10  Description of symptoms: pain, numbness, weakness in both hands and neck pain   Occupation: homemaker   Occupational Roles: parent; caregiver   Leisure activities/Hobbies: reorts no leisure activities. She takes care of her 3 children and elderly parents   Prior level of function: Independent  Current limitations: holding her baby, preparing meals for her children, opening containers, cutting food, lifting, carrying.  Pt goals: To have less pain and improved function of her hands  Hand Dominance: right  Living Situation: family    Past medical history was reviewed with Ma.  Significant findings include:    Imaging/Tests: Pt. is waiting to get an EMG   Ma  has a past medical history of Asthma (HCC), tuberculosis, and Ulcer.  She  has a past surgical history that includes .    ASSESSMENT  Ma presents to occupational therapy evaluation with primary c/o bilateral hand pain , weakness and numbness. The results of the objective tests and measures show Patient presents with decreased ROM and significant weakness in bilateral hands as well as numbness in all digits. She also preesents with neck pain with a positive Spurling's test on the left side. She may benefit from a  physical therapy consult regarding her neck pain.. Functional deficits include but are not limited to holding her baby, preparing meals for her children, opening containers, cutting food, lifting, carrying.. Signs and symptoms are consistent with diagnosis of Left arm pain (M79.602)  Bilateral hand numbness (R20.0). Pt and OT discussed evaluation findings, pathology, POC and HEP.  Pt voiced understanding and performs HEP correctly without reported pain. Skilled Occupational Therapy is medically necessary to address the above impairments and reach functional goals.  OBJECTIVE:    Musculoskeletal  Observation: Unremarkable guadring of bilateral UE       Cervical Screen: postive Spurling's on the left  Special Tests: Spruling's positive Left, ULTT median nerve positive left (quick dash 90 percent)     ROM and Strength  (* denotes performed with pain)    Cervical ROM MMT (-/5)     Flex WNL       Ext WNL      R L R L     Side bend WNL WNL (pain on the left side of neck with side)         Rotation WNL WNL             Shoulder   ROM MMT (-/5)    R L R L     Flex wnl nwl         Ext wnl wnl         Abd (C5) wnl wnl         IR wnl wnl         ER wnl wnl         Low Trap n/a         Mid Trap n/a         SA n/a            Elbow   ROM MMT (-/5)    R L R L     Flex (C6) wnl wnl         Ext (C7) wnl nwl         Pronation wnl wnl         Supination wnl wnl          Wrist   ROM MMT (-/5)    R L R L     Flex (C7) wnl wnl         Ext (C6) nwl wnl         Ulnar Dev wnl nwl         Radial Dev wnl wnl         Thumb Ext (C8) wnl wnl         Digit Flex (C8) n/a         Interossei (T1) n/a          (lbs) n/a         Pinch (lbs) n/a            R Hand ROM   IF MF RF SF     MP 0/85 0/85 0/85 0/85     PIP 0/90 0/90 0/90 0/90     DIP 0/40 0/40 0/15 0/35     MAHER              L Hand ROM   IF MF RF SF     MP 0/30 0/30 0/30 0/25     PIP -30/45 -30/85 -25/90 0/85     DIP 0/95 0/25 0/35 0/30     MAHER              Thumb ROM   MP Flex IP Flex Radial  Abd Palmar Abd Opposition     Right 35 45 40 45       Left 50 55 60 45        Hand Strength (lbs) R L      0 0     2 pt Pinch         3 pt Pinch         Lateral pinch 0 0       Flexibility:   significant tightness upper traps and levator scapula       Neurological:  Sensory: parathesias; numbness; tingling       Light Touch Ten Test: unable to detect light touch digits 1-5 bilaterally    ADLs/IADLs:  ADL's    Bathing: needs assist     Dressing: needs assist     Feeding: I     Grooming: needs assist  IADL's     Homemaking: needs assist     Food Prep: needs assist     Driving:     Other Functional Mobility/ADL Comments:        Today's Treatment and Response:   Pt education was provided on exam findings, treatment diagnosis, treatment plan, expectations, and prognosis.  Today's Treatment       4/7/2025   OT Treatment   Therapeutic Exercise Flexor tendon glides    Scapular retractions     Gentle neck stretches     Digital opposition     Thumb ROM all planes    Therapeutic Exercise Min 10   Total Timed Procedures 10   Total Service Procedures 10   Total Time 10         Patient was instructed in and issued a HEP for: Flexor tendon glides    Scapular retractions     Gentle neck stretches      Charges:  OT EVAL Moderate Complexity, TE x 1, moderate complexity eval x 1   Based on analysis of data from a detailed assessment from an expanded chart review, clinical presentation of physical, cognitive and psychosocial skills, as well as review of patient rated outcome measures, this evaluation involved Moderate complexity decision making, with 3-5 occupational performance component deficits, possible comorbidities, and minimal to moderate need for modification of tasks or assistance.                                                                                    PLAN OF CARE:    Goals: (to be met in 8 visits)    Patient will be Independent with HEP  Patient will gain full digital ROM for ease of grasping  Patient will  gain at least 10 lbs  strength bilaterally  Patient will gain at least 4 lbs of lateral pinch strength for ease of opening a water bottle and pulling up pants   Patient will report a decrease in pain at the worst to a 4/10  Patient will improve postural awareness as evidenced by positioning shoulders more posterior  during exercises  Patient will identify 3-5 activity modification techniques to improve I   8     Improve quick dash to 50 percent    Frequency / Duration: Patient will be seen 1-2 x per weekx/week or a total of 8 visits over a 90 day period. Treatment will include: Neuromuscular Re-education; Self-Care Home Management; Therapeutic Activities; Therapeutic Exercise; Home Exercise Program instruction    Education or treatment limitation: None   Rehab Potential: good     QuickDASH Outcome Score  90 percent  recorded on paper     Patient/Family/Caregiver was advised of these findings, precautions, and treatment options and has agreed to actively participate in planning and for this course of care.    Thank you for your referral. Please co-sign or sign and return this letter via fax as soon as possible to [phone]. If you have any questions, please contact me at Dept: [phone]    Sincerely,  Electronically signed by therapist: Aleah Garcia, OT  Physician's certification required: Yes  I certify the need for these services furnished under this plan of treatment and while under my care.    X___________________________________________________ Date____________________    Certification From: 4/07/2025  To:7/7/2025

## 2025-04-11 ENCOUNTER — POSTPARTUM (OUTPATIENT)
Dept: OBGYN CLINIC | Facility: CLINIC | Age: 45
End: 2025-04-11
Payer: MEDICAID

## 2025-04-11 ENCOUNTER — TELEPHONE (OUTPATIENT)
Dept: OBGYN CLINIC | Facility: CLINIC | Age: 45
End: 2025-04-11

## 2025-04-11 VITALS
SYSTOLIC BLOOD PRESSURE: 72 MMHG | DIASTOLIC BLOOD PRESSURE: 44 MMHG | BODY MASS INDEX: 22 KG/M2 | HEART RATE: 82 BPM | WEIGHT: 123.19 LBS

## 2025-04-11 DIAGNOSIS — Z12.31 SCREENING MAMMOGRAM FOR BREAST CANCER: ICD-10-CM

## 2025-04-11 DIAGNOSIS — R92.8 ABNORMAL MAMMOGRAM OF BOTH BREASTS: ICD-10-CM

## 2025-04-11 PROBLEM — R20.0 BILATERAL HAND NUMBNESS: Status: RESOLVED | Noted: 2025-03-18 | Resolved: 2025-04-11

## 2025-04-11 NOTE — PROGRESS NOTES
The following individual(s) verbally consented to be recorded using ambient AI listening technology and understand that they can each withdraw their consent to this listening technology at any point by asking the clinician to turn off or pause the recording:    Patient name: Sabino Carrizales Nu  Additional names:

## 2025-04-11 NOTE — TELEPHONE ENCOUNTER
Received Free Diaper and Car seat or Portable Crib Program forms from Dr. Bhakta. Patient did not fill out what size diapers needed.    Called patient states would like size 2 diapers.    Both Forms filled and faxed back to [facility] at [phone].    Fax confirmation received and sent to scanning

## 2025-04-11 NOTE — PROGRESS NOTES
[facility]    Language Line: [phone]  History of Present Illness  The patient, with a history of recent childbirth, presents for a postpartum follow-up. She reports persistent, albeit improving, left hand numbness that was particularly bothersome in the days following the procedure. The numbness is now localized to the fingertips.    The patient also reports occasional dizziness, lasting only a few seconds at a time. This symptom was present before pregnancy, disappeared during pregnancy, and has now returned postpartum.    The patient underwent tubal removal during childbirth and is exclusively formula feeding. She reports normal bowel movements and urination. She denies any issues with the incision site.    The patient's last Pap smear was in May 2023, and she is due for a follow-up in May 2026. She has had a couple of mammograms in the past, the last one being in May 2024.        Sabino Manning is a 44 year old female  here for 6 week post-partum visit.  Patient delivered a  male infant on 25.  Patient had salpingectomy for contraception.  Patient is bottle feeding.   Patient denies symptoms of depression, [place]  depression scale score of 2 out of 30.      OBSTETRIC HISTORY  OB History    Para Term  AB Living   3 3 3 0 0 3   SAB IAB Ectopic Multiple Live Births   0 0 0 0 3      # Outcome Date GA Lbr Almas/2nd Weight Sex Type Anes PTL Lv   3 Term 25 37w3d  7 lb 5.5 oz (3.33 kg) M Caesarean Spinal N DANNY   2 Term 20 40w0d   F Caesarean   DANNY   1 Term 17 40w2d 54:30 / 06:50 8 lb 3.4 oz (3.725 kg) M CS-LTranv EPI N DANNY      Birth Comments: Time of Delivery:6:50pm  Mother's age:34  Maternal Blood Type:B-     Hearing Test:Passed bilateral   CCHD:passed  Bili T: 3.7 on 17 @0541      Complications: Other - see comments, Meconium, Arrest of dilatation       MEDICATIONS:  Medications - Current[1]    ALLERGIES:  Allergies[2]    PHYSICAL EXAM  Blood pressure (!) 72/44,  pulse 82, weight 123 lb 3.2 oz (55.9 kg), last menstrual period 06/12/2024, not currently breastfeeding.  General:  Well nourished, well developed woman in no acute distress  Abdomen:  soft, nontender, no masses. Incision healed well.  External Genitalia: normal appearance, hair distribution, and no lesions  Vagina:  Normal appearance without lesions, no abnormal discharge  Cervix:  Normal without tenderness on motion  Uterus: normal in size, contour, position, mobility, without tenderness  Adnexa: normal without masses or tenderness  Perineum: well healed perineum  Anus: no hemorroids       ASSESSMENT/PLAN  Patient to return for annual gyne exam in February.         [1]   Current Outpatient Medications:     omeprazole 20 MG Oral Capsule Delayed Release, , Disp: , Rfl:     HYDROcodone-acetaminophen 5-325 MG Oral Tab, Take 1 tablet by mouth every 8 (eight) hours as needed for Pain., Disp: 6 tablet, Rfl: 0    albuterol 108 (90 Base) MCG/ACT Inhalation Aero Soln, Inhale 2 puffs into the lungs every 4 (four) hours as needed., Disp: 54 g, Rfl: 3    Prenatal 27-0.8 MG Oral Tab, Take 1 tablet by mouth daily., Disp: , Rfl:     Ferrous Gluconate-C-Folic Acid (IRON-C OR), Take by mouth., Disp: , Rfl:     Cholecalciferol (VITAMIN D3) 1.25 MG (07252 UT) Oral Cap, Take 1 capsule by mouth every 14 (fourteen) days., Disp: , Rfl:     Fluticasone-Salmeterol (AIRDUO RESPICLICK 113/14) 113-14 MCG/ACT Inhalation Aerosol Powder, Breath Activated, Inhale 1 puff into the lungs 2 (two) times a day., Disp: 1 each, Rfl: 5  [2]   Allergies  Allergen Reactions    Ibuprofen OTHER (SEE COMMENTS)     Stomach discomfort

## 2025-04-14 ENCOUNTER — OFFICE VISIT (OUTPATIENT)
Dept: OCCUPATIONAL MEDICINE | Facility: HOSPITAL | Age: 45
End: 2025-04-14
Attending: FAMILY MEDICINE
Payer: MEDICAID

## 2025-04-14 PROCEDURE — 97110 THERAPEUTIC EXERCISES: CPT

## 2025-04-14 PROCEDURE — 97760 ORTHOTIC MGMT&TRAING 1ST ENC: CPT

## 2025-04-17 ENCOUNTER — APPOINTMENT (OUTPATIENT)
Dept: OCCUPATIONAL MEDICINE | Facility: HOSPITAL | Age: 45
End: 2025-04-17
Attending: FAMILY MEDICINE
Payer: MEDICAID

## 2025-04-18 NOTE — PROGRESS NOTES
Patient: Sabino Manning (44 year old, female) Referring Provider:  Insurance:   Diagnosis: Left arm pain (M79.602)  Bilateral hand numbness (R20.0) Loni PINEDA Ebony  [facility] MEDICAID   Date of Surgery: No data recorded Next MD visit:  N/A   Precautions:  None No data recorded Referral Information:   Date of Injury: No data recorded Date of Evaluation: Req: 8, Auth: 8, Exp: 6/6/2025 04/07/25 POC Auth Visits:  8       Today's Date   4/14/2025    Subjective  Patient reports that her left wrist hurts and she gets tingling in all of her fingers. She reports that the pain is worse at night       Pain: 5/10     Objective  Patient attends therapy today with reports of doing her stretches             Assessment  Patient presents with increasing wrist pain and tingling at night. She may bnefit from a trial of wearing an orthotic to calm her symptoms    Goals (to be met in 8 visits)   Patient will be Independent with HEP  Patient will gain full digital ROM for ease of grasping  Patient will gain at least 10 lbs  strength bilaterally  Patient will gain at least 4 lbs of lateral pinch strength for ease of opening a water bottle and pulling up pants   Patient will report a decrease in pain at the worst to a 4/10  Patient will improve postural awareness as evidenced by positioning shoulders more posterior  during exercises  Patient will identify 3-5 activity modification techniques to improve I   8     Improve quick dash to 50 percent      Plan  Patient will be seen 1-2 x per weekx/week or a total of 8 visits over a 90 day period. Treatment will include: Neuromuscular Re-education; Self-Care Home Management; Therapeutic Activities; Therapeutic Exercise; Home Exercise Program instruction    Treatment Last 4 Visits        4/7/2025 4/14/2025   OT Treatment   Treatment Day  2   Therapeutic Exercise Flexor tendon glides    Scapular retractions     Gentle neck stretches     Digital opposition     Thumb ROM all planes  Yellow putty for   and pinch  Blue wedges for adduction and yellow theraband for digital abduction    Flexor tendon glides   Opposition   Extrinsic forearm flexor and extensor stretches   Power web for gentle grasping   OMT  Custom made a volar wrist orthoses for the left wrist to wear at night to calm patients symptoms for pain and tingling. Reviewed orthotic wear and care    Therapeutic Exercise Min 10 40   OMT Min  15   Total Timed Procedures 10 55   Total Service Procedures 10 55   Total Time 10 55         HEP       Charges     TE x 3, OMT x 1    55

## 2025-04-21 ENCOUNTER — TELEPHONE (OUTPATIENT)
Dept: PHYSICAL THERAPY | Age: 45
End: 2025-04-21

## 2025-04-21 ENCOUNTER — APPOINTMENT (OUTPATIENT)
Dept: OCCUPATIONAL MEDICINE | Facility: HOSPITAL | Age: 45
End: 2025-04-21
Attending: FAMILY MEDICINE
Payer: MEDICAID

## 2025-04-21 PROCEDURE — 97530 THERAPEUTIC ACTIVITIES: CPT

## 2025-04-21 PROCEDURE — 97110 THERAPEUTIC EXERCISES: CPT

## 2025-04-21 NOTE — PROGRESS NOTES
Patient: Sabino Manning (44 year old, female) Referring Provider:  Insurance:   Diagnosis: Left arm pain (M79.602)  Bilateral hand numbness (R20.0) Loni PINEDA Ebony  [facility] MEDICAID   Date of Surgery: No data recorded Next MD visit:  N/A   Precautions:  None No data recorded Referral Information:   Date of Injury: No data recorded Date of Evaluation: Req: 8, Auth: 8, Exp: 6/6/2025 04/07/25 POC Auth Visits:  8       Today's Date   4/21/2025    Subjective  Patient reports that her fingers index, middle and ring are still numb. She also reports the majority of pain is at night       Pain: -- (5/10 on the right and 8/10 on the left)/10     Objective  Patient attends therapy today without her custom orthoses.She reports that she has to remove it at time during the night to  her baby           Assessment  Pt. continues to have significant pain on the left wrist with symptoms of carpal tunnel symdrome. Pt. also had poor tolerance to the custom brace at night    Goals (to be met in 8 visits)   Patient will be Independent with HEP  Patient will gain full digital ROM for ease of grasping  Patient will gain at least 10 lbs  strength bilaterally  Patient will gain at least 4 lbs of lateral pinch strength for ease of opening a water bottle and pulling up pants   Patient will report a decrease in pain at the worst to a 4/10  Patient will improve postural awareness as evidenced by positioning shoulders more posterior  during exercises  Patient will identify 3-5 activity modification techniques to improve I   8     Improve quick dash to 50 percent        Plan  Patient will be seen 1-2 x per weekx/week or a total of 8 visits over a 90 day period. Treatment will include: Neuromuscular Re-education; Self-Care Home Management; Therapeutic Activities; Therapeutic Exercise; Home Exercise Program instruction    Treatment Last 4 Visits        4/7/2025 4/14/2025 4/21/2025   OT Treatment   Treatment Day  2 3   Therapeutic Exercise  Flexor tendon glides    Scapular retractions     Gentle neck stretches     Digital opposition     Thumb ROM all planes  Yellow putty for  and pinch  Blue wedges for adduction and yellow theraband for digital abduction    Flexor tendon glides   Opposition   Extrinsic forearm flexor and extensor stretches   Power web for gentle grasping Extrinsic forearm flexor and extensor stretches  Median nerve glides  Flexor tendon glides   Yellow flex bar   Digital adduction with pink sponges      Therapeutic Activity   Yellow putty grasp with red cone  Gripper 15 lbs  pom poms   Lumbrical grasp with pom poms   Small pegs in yellow putty for pinch   Cone with putty for grasp   OMT  Custom made a volar wrist orthoses for the left wrist to wear at night to calm patients symptoms for pain and tingling. Reviewed orthotic wear and care     Therapeutic Exercise Min 10 40 15   Ther Activity Min   15   OMT Min  15    Total Timed Procedures 10 55 30   Total Service Procedures 10 55 30   Total Time 10 55 30         HEP   Continue with current exercises    Charges     TE x 1, TA x 1    30

## 2025-04-23 ENCOUNTER — TELEPHONE (OUTPATIENT)
Dept: OCCUPATIONAL MEDICINE | Facility: HOSPITAL | Age: 45
End: 2025-04-23

## 2025-04-25 ENCOUNTER — TELEPHONE (OUTPATIENT)
Dept: FAMILY MEDICINE CLINIC | Facility: CLINIC | Age: 45
End: 2025-04-25

## 2025-04-25 NOTE — TELEPHONE ENCOUNTER
Medications - Current[1]     omeprazole 20 MG Oral Capsule Delayed Release, , Disp: , Rfl:        [1]   Current Outpatient Medications:     omeprazole 20 MG Oral Capsule Delayed Release, , Disp: , Rfl:     HYDROcodone-acetaminophen 5-325 MG Oral Tab, Take 1 tablet by mouth every 8 (eight) hours as needed for Pain., Disp: 6 tablet, Rfl: 0    albuterol 108 (90 Base) MCG/ACT Inhalation Aero Soln, Inhale 2 puffs into the lungs every 4 (four) hours as needed., Disp: 54 g, Rfl: 3    Prenatal 27-0.8 MG Oral Tab, Take 1 tablet by mouth daily., Disp: , Rfl:     Ferrous Gluconate-C-Folic Acid (IRON-C OR), Take by mouth., Disp: , Rfl:     Cholecalciferol (VITAMIN D3) 1.25 MG (95966 UT) Oral Cap, Take 1 capsule by mouth every 14 (fourteen) days., Disp: , Rfl:     Fluticasone-Salmeterol (AIRDUO RESPICLICK 113/14) 113-14 MCG/ACT Inhalation Aerosol Powder, Breath Activated, Inhale 1 puff into the lungs 2 (two) times a day., Disp: 1 each, Rfl: 5

## 2025-04-28 ENCOUNTER — PROCEDURE VISIT (OUTPATIENT)
Dept: PHYSICAL MEDICINE AND REHAB | Facility: CLINIC | Age: 45
End: 2025-04-28
Payer: MEDICAID

## 2025-04-28 ENCOUNTER — OFFICE VISIT (OUTPATIENT)
Dept: OCCUPATIONAL MEDICINE | Facility: HOSPITAL | Age: 45
End: 2025-04-28
Attending: FAMILY MEDICINE
Payer: MEDICAID

## 2025-04-28 PROCEDURE — 97530 THERAPEUTIC ACTIVITIES: CPT

## 2025-04-28 PROCEDURE — 97110 THERAPEUTIC EXERCISES: CPT

## 2025-04-28 NOTE — PROGRESS NOTES
Patient: Sabino Manning (44 year old, female) Referring Provider:  Insurance:   Diagnosis: Left arm pain (M79.602)  Bilateral hand numbness (R20.0) Loni PINEDA Ebony  [facility] MEDICAID   Date of Surgery: No data recorded Next MD visit:  N/A   Precautions:  None No data recorded Referral Information:   Date of Injury: No data recorded Date of Evaluation: Req: 8, Auth: 8, Exp: 6/6/2025 04/07/25 POC Auth Visits:  8       Today's Date   4/28/2025    Subjective  Patient reports that she just had a ( nerve study) and she has increased pain and she feels sick       Pain: 5/10     Objective  Patient attends therapy today immediately after her nerve study.             Assessment  Pt. presents with the ability to make a full fist with her left hand. Numbness still impacts fine motor skills    Goals (to be met in 8 visits)   Patient will be Independent with HEP  Patient will gain full digital ROM for ease of grasping  Patient will gain at least 10 lbs  strength bilaterally  Patient will gain at least 4 lbs of lateral pinch strength for ease of opening a water bottle and pulling up pants   Patient will report a decrease in pain at the worst to a 4/10  Patient will improve postural awareness as evidenced by positioning shoulders more posterior  during exercises  Patient will identify 3-5 activity modification techniques to improve I   8     Improve quick dash to 50 percent          Plan  Patient will be seen 1-2 x per weekx/week or a total of 8 visits over a 90 day period. Treatment will include: Neuromuscular Re-education; Self-Care Home Management; Therapeutic Activities; Therapeutic Exercise; Home Exercise Program instruction    Treatment Last 4 Visits        4/7/2025 4/14/2025 4/21/2025 4/28/2025   OT Treatment   Treatment Day  2 3 4   Therapeutic Exercise Flexor tendon glides    Scapular retractions     Gentle neck stretches     Digital opposition     Thumb ROM all planes  Yellow putty for  and pinch  Blue wedges for  adduction and yellow theraband for digital abduction    Flexor tendon glides   Opposition   Extrinsic forearm flexor and extensor stretches   Power web for gentle grasping Extrinsic forearm flexor and extensor stretches  Median nerve glides  Flexor tendon glides   Yellow flex bar   Digital adduction with pink sponges    Extrinsic forearm flexor and extensor stretches   Median nerve glides   Flexor tendon glides   Yellow flex bar   Digital adduction with pink spon      Therapeutic Activity   Yellow putty grasp with red cone  Gripper 15 lbs  pom poms   Lumbrical grasp with pom poms   Small pegs in yellow putty for pinch   Cone with putty for grasp Yellow putty grasp with red cone   Gripper 15 lbs  pom poms   Lumbrical grasp with pom poms   Small pegs in yellow putty for pinch   Cone with putty for grasp      OMT  Custom made a volar wrist orthoses for the left wrist to wear at night to calm patients symptoms for pain and tingling. Reviewed orthotic wear and care      Therapeutic Exercise Min 10 40 15 15   Ther Activity Min   15 30   OMT Min  15     Total Timed Procedures 10 55 30 45   Total Service Procedures 10 55 30 45   Total Time 10 55 30 45         HEP  Flexor tendon glides   Median nerve glides  Yellow putty for  and pinch     Charges      TA x 2, TE x 1     45

## 2025-04-28 NOTE — PROCEDURES
[address]  [address] [place] [address]  Phone: [phone]  Fax: [phone]    ELECTRODIAGNOSTIC REPORT          Patient: LUIS FERNANDO PERRY Hand Dominance:      Patient ID: [id number] Referring Dr:  BONI LUO   Sex: Female Test Dr:  MARQUEZ RAMIREZ DO    YOB: 1980           Visit Date: 44 Years Examining MD:     Age: 44 Years Referred by:     Height: 5 feet 3 inch     Referred for: EMG BUE  ADMITS N/T AND WEAKNESS  HX OD DEII, THYROID DISEASE, ALCO ABUSE  CPL: 5/10               Summary    The motor conduction test was unremarkable in all 1 of the tested nerves: L Median - APB.    The sensory conduction test was performed on 2 nerve(s). The results were normal in 1 nerve(s): L Ulnar - Digit V (Antidromic). Results outside the specified normal range were found in 1 nerve(s), as follows:  In the L Median - Digit II (Antidromic) study  the peak latency was increased for Wrist stimulation  the peak amplitude was reduced for Wrist stimulation  the peak-to-peak amplitude was reduced for Wrist stimulation          Conclusion:       This was a limited study.     There are abnormalities found in the median sensory nerve on the left side which may be consistent with carpal tunnel syndrome however a full diagnostic study was unable to be performed secondary to patient tolerance.    Marquez Ramirez DO  Interventional Spine and Sports Medicine Specialist   Physical Medicine and Rehabilitation  [address] [address]    [address] [place] [address]         ________________________________               Sensory NCS      Nerve / Sites Rec. Site Onset Lat Peak Lat NP Amp PP Amp Segments Distance Velocity     ms ms µV µV  cm m/s   L Median - Digit II (Antidromic)      Wrist Dig II 4.83 5.83 5.0 12.8 Wrist - Dig II 13 27      Ref.   <=3.40 >=15.0 >=20.0 Ref.     L Ulnar - Digit  V (Antidromic)      Wrist Dig V 2.40 3.19 42.7 59.1 Wrist - Dig V 14 58      Ref.   <=3.70 >=15.0 >=15.0 Ref.

## 2025-04-28 NOTE — PROGRESS NOTES
This was a limited study.    There are abnormalities found in the median sensory nerve on the left side which may be consistent with carpal tunnel syndrome however a full diagnostic study was unable to be performed secondary to patient tolerance.    Henry Ramirez DO  Interventional Spine and Sports Medicine Specialist   Physical Medicine and Rehabilitation  [facility]  [address] [place] [id number]    [address] [place] [address]

## 2025-04-30 ENCOUNTER — APPOINTMENT (OUTPATIENT)
Dept: OCCUPATIONAL MEDICINE | Facility: HOSPITAL | Age: 45
End: 2025-04-30
Attending: FAMILY MEDICINE
Payer: MEDICAID

## 2025-04-30 ENCOUNTER — TELEPHONE (OUTPATIENT)
Dept: FAMILY MEDICINE CLINIC | Facility: CLINIC | Age: 45
End: 2025-04-30

## 2025-04-30 NOTE — TELEPHONE ENCOUNTER
Patient contacts clinic reporting she still has pain and numbness in her left hand and arm.  Seen in office 03/18.  She is due to return to work and is requesting a letter from Dr. Beck to allow her to stay home for an additional month.  Nurse advised appointment to re-evaluate symptoms.  Patient declines, requests message be sent to provider.  Please advise if letter can be provided.

## 2025-04-30 NOTE — TELEPHONE ENCOUNTER
Dr. Beck: patient called on status.     Patient is expected to go back to work May 4th. She would like to be off another month and required medical note.   Last office visit 3/18/25 .  Do you suggest appointment ?     (Patient informed Message has been routed to provider earlier today and will await response. )

## 2025-04-30 NOTE — TELEPHONE ENCOUNTER
Patient was called and inform of  message below and she verbalized understanding. Appointment was made for 5/2/2025      Future Appointments   Date Time Provider Department Center   5/2/2025 10:30 AM [facility]   5/5/2025  1:15 PM Aleah Garcia, OT [facility] OT [facility]   5/12/2025  1:00 PM Aleah Garcia OT [facility] OT [facility]

## 2025-05-02 ENCOUNTER — TELEMEDICINE (OUTPATIENT)
Dept: FAMILY MEDICINE CLINIC | Facility: CLINIC | Age: 45
End: 2025-05-02
Payer: MEDICAID

## 2025-05-02 DIAGNOSIS — R20.0 BILATERAL HAND NUMBNESS: Primary | ICD-10-CM

## 2025-05-02 PROCEDURE — 99213 OFFICE O/P EST LOW 20 MIN: CPT

## 2025-05-02 NOTE — PROGRESS NOTES
Patient ID: Sabino Manning is a 44 year old female who requests evaluation of hand pain.    This visit is conducted using Telemedicine with live, interactive video and audio.    At the start of this encounter I confirmed the patient's full name, date of birth, and that they are currently in the [facility].    HISTORY OF PRESENT ILLNESS:     Patient started numbness in bilateral hands 1 month before giving birth to her son. Patient is doing ot weekly. Patient requesting letter for work.     Review of Systems   Constitutional: Negative.  Negative for activity change and fatigue.   HENT: Negative.  Negative for congestion, ear pain, rhinorrhea and sneezing.    Eyes: Negative.  Negative for redness.   Respiratory: Negative.  Negative for cough, shortness of breath and wheezing.    Cardiovascular: Negative.  Negative for chest pain.   Gastrointestinal: Negative.  Negative for abdominal pain, constipation, diarrhea, nausea and vomiting.   Endocrine: Negative.    Genitourinary: Negative.  Negative for difficulty urinating and frequency.   Musculoskeletal: Negative.  Negative for back pain, joint swelling and myalgias.   Skin: Negative.  Negative for rash.   Allergic/Immunologic: Negative.    Neurological:  Positive for weakness and numbness. Negative for dizziness, syncope, light-headedness and headaches.   Hematological: Negative.    Psychiatric/Behavioral: Negative.            MEDICAL HISTORY:   Past Medical History[1]    Past Surgical History[2]    Medications - Current[3]    Allergies:Allergies[4]    Social History     Socioeconomic History    Marital status:      Spouse name: Not on file    Number of children: Not on file    Years of education: Not on file    Highest education level: Not on file   Occupational History    Not on file   Tobacco Use    Smoking status: Never    Smokeless tobacco: Never   Vaping Use    Vaping status: Never Used   Substance and Sexual Activity    Alcohol use: No     Alcohol/week:  0.0 standard drinks of alcohol    Drug use: No    Sexual activity: Yes     Partners: Male   Other Topics Concern     Service Not Asked    Blood Transfusions Not Asked    Caffeine Concern Yes     Comment: tea one cup    Occupational Exposure Not Asked    Hobby Hazards Not Asked    Sleep Concern Not Asked    Stress Concern Not Asked    Weight Concern Not Asked    Special Diet Not Asked    Back Care Not Asked    Exercise Not Asked    Bike Helmet Not Asked    Seat Belt Not Asked    Self-Exams Not Asked   Social History Narrative    Not on file     Social Drivers of Health     Food Insecurity: No Food Insecurity (2/26/2025)    NCSS - Food Insecurity     Worried About Running Out of Food in the Last Year: No     Ran Out of Food in the Last Year: No   Transportation Needs: No Transportation Needs (2/26/2025)    NCSS - Transportation     Lack of Transportation: No   Stress: No Stress Concern Present (2/26/2025)    Stress     Feeling of Stress : No   Housing Stability: Not At Risk (2/26/2025)    NCSS - Housing/Utilities     Has Housing: Yes     Worried About Losing Housing: No     Unable to Get Utilities: No       PHYSICAL EXAM:   Unable to perform vitals or do physical exam as this is a virtual video visit.  Patient appears alert. No conversational dyspnea or distress.    ASSESSMENT/PLAN:   1. Bilateral hand numbness  -Advised patient to continue OT  -Letter given for work.    Return in about 4 weeks (around 5/30/2025).    Time spent on encounter  10 minutes   Video time 10 minutes   Documentation time 5 minutes     Sabino Manning understands video evaluation is not a substitute for face-to-face examination or emergency care. Patient advised to go to ER or call 911 for worsening symptoms or acute distress.     Telehealth outside of [facility]  Telehealth Verbal Consent   I conducted a telehealth visit with Sabino Manning today, 05/02/25, which was completed using two-way, real-time interactive audio and video  communication.  The patient was made aware of the limitations of the telehealth visit, including treatment limitations as no physical exam could be performed.  The patient was advised to call 911 or to go to the ER in case there was an emergency.  The patient was also advised of the potential privacy & security concerns related to the telehealth platform.   The patient was made aware of where to find [facility]'s notice of privacy practices, telehealth consent form and other related consent forms and documents.  which are located on the [facility] website. The patient verbally agreed to telehealth consent form, related consents and the risks discussed.    Included in this visit, time may have been spent reviewing labs, medications, radiology tests and decision making. Appropriate medical decision-making and tests are ordered as detailed in the plan of care above.  Coding/billing information is submitted for this visit based on complexity of care and/or time spent for the visit.    This note was prepared using Dragon Medical voice recognition dictation software. As a result errors may occur. When identified these errors have been corrected. While every attempt is made to correct errors during dictation discrepancies may still exist.    Marilyn Carreon, APRN  2025        [1]   Past Medical History:   Asthma (HCC)    Hx of tuberculosis    Ulcer   [2]   Past Surgical History:  Procedure Laterality Date          x2   [3]   Current Outpatient Medications:     benzonatate 200 MG Oral Cap, Take 1 capsule (200 mg total) by mouth 3 (three) times daily as needed., Disp: 21 capsule, Rfl: 0    omeprazole 20 MG Oral Capsule Delayed Release, , Disp: , Rfl:     albuterol 108 (90 Base) MCG/ACT Inhalation Aero Soln, Inhale 2 puffs into the lungs every 4 (four) hours as needed., Disp: 54 g, Rfl: 3    Ferrous Gluconate-C-Folic Acid (IRON-C OR), Take by mouth., Disp: , Rfl:     Fluticasone-Salmeterol (AIRDUO RESPICLICK 113/) 113-14  MCG/ACT Inhalation Aerosol Powder, Breath Activated, Inhale 1 puff into the lungs 2 (two) times a day. (Patient not taking: Reported on 5/28/2025), Disp: 1 each, Rfl: 5  [4]   Allergies  Allergen Reactions    Ibuprofen OTHER (SEE COMMENTS)     Stomach discomfort

## 2025-05-05 ENCOUNTER — APPOINTMENT (OUTPATIENT)
Dept: OCCUPATIONAL MEDICINE | Facility: HOSPITAL | Age: 45
End: 2025-05-05
Attending: FAMILY MEDICINE
Payer: MEDICAID

## 2025-05-07 ENCOUNTER — APPOINTMENT (OUTPATIENT)
Dept: OCCUPATIONAL MEDICINE | Facility: HOSPITAL | Age: 45
End: 2025-05-07
Attending: FAMILY MEDICINE
Payer: MEDICAID

## 2025-05-12 ENCOUNTER — APPOINTMENT (OUTPATIENT)
Dept: OCCUPATIONAL MEDICINE | Facility: HOSPITAL | Age: 45
End: 2025-05-12
Attending: FAMILY MEDICINE
Payer: MEDICAID

## 2025-05-23 ENCOUNTER — OFFICE VISIT (OUTPATIENT)
Dept: FAMILY MEDICINE CLINIC | Facility: CLINIC | Age: 45
End: 2025-05-23
Payer: MEDICAID

## 2025-05-23 ENCOUNTER — NURSE TRIAGE (OUTPATIENT)
Dept: FAMILY MEDICINE CLINIC | Facility: CLINIC | Age: 45
End: 2025-05-23

## 2025-05-23 VITALS
HEIGHT: 63 IN | DIASTOLIC BLOOD PRESSURE: 58 MMHG | OXYGEN SATURATION: 98 % | RESPIRATION RATE: 16 BRPM | TEMPERATURE: 100 F | BODY MASS INDEX: 21.44 KG/M2 | HEART RATE: 90 BPM | WEIGHT: 121 LBS | SYSTOLIC BLOOD PRESSURE: 96 MMHG

## 2025-05-23 DIAGNOSIS — J11.1 INFLUENZA-LIKE ILLNESS: Primary | ICD-10-CM

## 2025-05-23 PROCEDURE — 87637 SARSCOV2&INF A&B&RSV AMP PRB: CPT | Performed by: NURSE PRACTITIONER

## 2025-05-23 NOTE — PROGRESS NOTES
CHIEF COMPLAINT:     Chief Complaint   Patient presents with    Cough     Sx Wednesday - ST, sinus pressure, tactile fever, chills, body aches, R ear pain, nasal congestion, runny nose, diarrhea  Sx yesterday - Productive cough, chest congestion,   Denies headache, n/v, SOB, rash  No Covid test was done at home  OTC Day/NyQuil, Tylenol       HPI:   Sabino Manning is a 44 year old female presents to clinic with complaint of URI/flu like symptoms, onset 2 days.  C/o sore throat, sinus pressure, productive cough, chest congestion, tactile fever, chills, body aches, R ear pain, nasal congestion, rhinorrhea, diarrhea.  Denies SOB, dyspnea, chest pain, HA, N/V/abdominal pain, or rashes.  Taking day/nyquil, tylenol.  No known ill contacts or travel.  Tolerating PO.  Denies any hx of covid.    Current Medications[1]   Past Medical History[2]   Social History:  Short Social Hx on File[3]     REVIEW OF SYSTEMS:   GENERAL HEALTH: See HPI  SKIN: denies any unusual skin lesions or rashes  HEENT: denies ear pain or difficulty swallowing/eating. See HPI  RESPIRATORY: denies shortness of breath or wheezing  CARDIOVASCULAR: denies chest pain or palpitations   GI: denies vomiting or diarrhea  NEURO: denies dizziness or lightheadedness    EXAM:   BP 96/58   Pulse 90   Temp 99.5 °F (37.5 °C) (Tympanic)   Resp 16   Ht 5' 3\" (1.6 m)   Wt 121 lb (54.9 kg)   LMP 06/12/2024 (Approximate)   SpO2 98%   Breastfeeding No   BMI 21.43 kg/m²   GENERAL: +Mildly ill-appearing/flu like, well developed, well nourished, in no apparent distress  SKIN: no rashes, no suspicious lesions  HEAD: atraumatic, normocephalic  EYES: conjunctiva clear, EOM intact  EARS: TM's clear, non-injected, no bulging, retraction, or fluid bilaterally  NOSE: nostrils patent, +clear exudates, nasal mucosa pink and noninflamed  THROAT: oral mucosa pink, moist. Posterior pharynx non-erythematous. No exudates.   NECK: supple, non-tender  LUNGS: clear to auscultation  bilaterally, no wheezes or rhonchi. Breathing is non labored. +Dry cough.  CARDIO: RRR without murmur  LYMPH: No lymphadenopathy.    No results found for this or any previous visit (from the past 24 hours).      ASSESSMENT AND PLAN:   Assessment:   Ma was seen today for cough.    Diagnoses and all orders for this visit:    Influenza-like illness  -     SARS-CoV-2/Flu A and B/RSV by PCR ([facility]) [E] *Collect in Office!; Future  -     SARS-CoV-2/Flu A and B/RSV by PCR ([facility]) [E] *Collect in Office!          Plan:   - Hx/exam c/w viral flu like illness.  - Quad respiratory panel to lab.    - Comfort/otc measures explained and discussed as listed in Patient Instructions.  - Advised follow up within 5 days if not improving, condition worsens, or persistent fevers.  - Pt verbalizes understanding and is agreeable w/ plan.    There are no Patient Instructions on file for this visit.                  [1]   Current Outpatient Medications   Medication Sig Dispense Refill    omeprazole 20 MG Oral Capsule Delayed Release       albuterol 108 (90 Base) MCG/ACT Inhalation Aero Soln Inhale 2 puffs into the lungs every 4 (four) hours as needed. 54 g 3    Prenatal 27-0.8 MG Oral Tab Take 1 tablet by mouth daily.      Ferrous Gluconate-C-Folic Acid (IRON-C OR) Take by mouth.      Cholecalciferol (VITAMIN D3) 1.25 MG (65926 UT) Oral Cap Take 1 capsule by mouth every 14 (fourteen) days.      Fluticasone-Salmeterol (AIRDUO RESPICLICK 113/14) 113-14 MCG/ACT Inhalation Aerosol Powder, Breath Activated Inhale 1 puff into the lungs 2 (two) times a day. 1 each 5   [2]   Past Medical History:   Asthma (HCC)    Hx of tuberculosis    Ulcer   [3]   Social History  Socioeconomic History    Marital status:    Tobacco Use    Smoking status: Never    Smokeless tobacco: Never   Substance and Sexual Activity    Alcohol use: No     Alcohol/week: 0.0 standard drinks of alcohol    Drug use: No    Sexual activity: Yes     Partners: Male   Other  Topics Concern    Caffeine Concern Yes     Comment: tea one cup     Social Drivers of Health     Food Insecurity: No Food Insecurity (2/26/2025)    NCSS - Food Insecurity     Worried About Running Out of Food in the Last Year: No     Ran Out of Food in the Last Year: No   Transportation Needs: No Transportation Needs (2/26/2025)    NCSS - Transportation     Lack of Transportation: No   Stress: No Stress Concern Present (2/26/2025)    Stress     Feeling of Stress : No   Housing Stability: Not At Risk (2/26/2025)    NCSS - Housing/Utilities     Has Housing: Yes     Worried About Losing Housing: No     Unable to Get Utilities: No

## 2025-05-23 NOTE — TELEPHONE ENCOUNTER
Action Requested: Summary for Provider     []  Critical Lab, Recommendations Needed  [] Need Additional Advice  []   FYI    []   Need Orders  [] Need Medications Sent to Pharmacy  []  Other     SUMMARY: Office visit. There are no available appointments. Patient will go to [facility].     Future Appointments   Date Time Provider Department Center   5/23/2025  1:30 PM [facility] [address]         Reason for call: Nasal Congestion  Onset: Data Unavailable    Patient states that she is cold and hot, runny nose, headache, sore throat and cough. Symptoms started yesterday. She is taking Tylenol with no relief. She is also taking Dayquil and Nyquil as well with no relief. Denies shortness of breath, chest pain or fever.     Reason for Disposition   Patient wants to be seen    Protocols used: Common Cold-A-OH

## 2025-05-24 LAB
FLUAV + FLUBV RNA SPEC NAA+PROBE: DETECTED
FLUAV + FLUBV RNA SPEC NAA+PROBE: NOT DETECTED
RSV RNA SPEC NAA+PROBE: NOT DETECTED
SARS-COV-2 RNA RESP QL NAA+PROBE: NOT DETECTED

## 2025-05-27 ENCOUNTER — TELEPHONE (OUTPATIENT)
Dept: FAMILY MEDICINE CLINIC | Facility: CLINIC | Age: 45
End: 2025-05-27

## 2025-05-27 NOTE — TELEPHONE ENCOUNTER
Pt was seen at [facility]- flu like s/s 5/23/25  Still coughing taking cough syrup at daytime, runny nose , Ibuprofen for body pain, requesting appt - made for tomorrow per pt request

## 2025-05-28 ENCOUNTER — OFFICE VISIT (OUTPATIENT)
Dept: INTERNAL MEDICINE CLINIC | Facility: CLINIC | Age: 45
End: 2025-05-28

## 2025-05-28 VITALS
BODY MASS INDEX: 21.05 KG/M2 | OXYGEN SATURATION: 98 % | TEMPERATURE: 98 F | WEIGHT: 118.81 LBS | HEIGHT: 63 IN | DIASTOLIC BLOOD PRESSURE: 56 MMHG | HEART RATE: 91 BPM | SYSTOLIC BLOOD PRESSURE: 98 MMHG

## 2025-05-28 DIAGNOSIS — J10.1 INFLUENZA DUE TO INFLUENZA VIRUS, TYPE B: Primary | ICD-10-CM

## 2025-05-28 PROCEDURE — 99213 OFFICE O/P EST LOW 20 MIN: CPT | Performed by: NURSE PRACTITIONER

## 2025-05-28 RX ORDER — BENZONATATE 200 MG/1
200 CAPSULE ORAL 3 TIMES DAILY PRN
Qty: 21 CAPSULE | Refills: 0 | Status: SHIPPED | OUTPATIENT
Start: 2025-05-28 | End: 2025-06-04

## 2025-05-28 NOTE — PATIENT INSTRUCTIONS
Typical viral illness can last 7-10 days with cough that can linger.  Antibiotics do not help influenza go away.    Benzonatate 1 pill every 8 hours as needed for cough - may cause drowsiness.  Medications for your symptoms:  Acetaminophen 650mg every 4-6 hours for pain.     Drink plenty of fluids. Tea with honey, honey-based throat lozenges.    REST    Over the counter decongestant of your choice - i.e. mucinex-D, claritin-D, or combination medications like dayquil/nyquil every 6 hours (these medications also contain tylenol - do not exceed 4000mg tylenol in a 24 hour period).

## 2025-05-28 NOTE — PROGRESS NOTES
Subjective:   Sabino Manning is a 44 year old female with no significant PMH who presents for Sore Throat and Fatigue     History of Present Illness  Sabino Manning is a 44 year old female with asthma who presents with persistent symptoms following a diagnosis of influenza B.    Symptoms began on May 21, 2025, and she was diagnosed with influenza B on May 23, 2025, at a walk-in clinic. She was managing her symptoms with comfort measures and over-the-counter medications.    Today she continues to experience a sore throat, productive cough with yellow phlegm. She takes Tylenol once or twice daily, uses a cough liquid during the day, and consumes honey with hot water. She cannot take ibuprofen due to a previous adverse reaction affecting her stomach. No fevers are present, but she experiences fatigue and mild chest pain when coughing, without dyspnea.    She has a history of asthma but has not used her albuterol inhaler during this illness and has not experienced asthma symptoms for some time. She is not currently breastfeeding her three-month-old baby [place]      History/Other:    Chief Complaint Reviewed and Verified  No Further Nursing Notes to   Review  Tobacco Reviewed  Allergies Reviewed  Medications Reviewed    Problem List Reviewed  Medical History Reviewed  Surgical History   Reviewed  OB Status Reviewed  Family History Reviewed  Social History   Reviewed         Tobacco:  She has never smoked tobacco.    Current Medications[1]      Review of Systems:  Review of Systems  10 point review of systems otherwise negative with the exception of HPI and assessment and plan.    Objective:   BP 98/56   Pulse 91   Temp 97.9 °F (36.6 °C) (Temporal)   Ht 5' 3\" (1.6 m)   Wt 118 lb 12.8 oz (53.9 kg)   LMP 05/25/2025 (Approximate)   SpO2 98%   BMI 21.04 kg/m²  Estimated body mass index is 21.04 kg/m² as calculated from the following:    Height as of this encounter: 5' 3\" (1.6 m).     Weight as of this encounter: 118 lb 12.8 oz (53.9 kg).        Physical Exam  Constitutional:       General: She is not in acute distress.     Appearance: She is not ill-appearing.   HENT:      Right Ear: Tympanic membrane normal.      Left Ear: Tympanic membrane normal.      Nose: Congestion present.      Mouth/Throat:      Mouth: Mucous membranes are moist.      Pharynx: Oropharynx is clear. No pharyngeal swelling, oropharyngeal exudate or posterior oropharyngeal erythema.      Tonsils: 0 on the right. 0 on the left.   Cardiovascular:      Rate and Rhythm: Normal rate and regular rhythm.      Heart sounds: Normal heart sounds.   Pulmonary:      Effort: Pulmonary effort is normal. No respiratory distress.      Breath sounds: Normal breath sounds.   Musculoskeletal:      Cervical back: Neck supple.   Lymphadenopathy:      Cervical: No cervical adenopathy.   Skin:     General: Skin is warm and dry.   Neurological:      Mental Status: She is alert.         Assessment & Plan:   1. Influenza due to influenza virus, type B  - benzonatate 200 MG Oral Cap; Take 1 capsule (200 mg total) by mouth 3 (three) times daily as needed.  Dispense: 21 capsule; Refill: 0    Assessment & Plan  Influenza B  Diagnosed with viral etiology. Antibiotics not indicated. Supportive care advised.  - Acetaminophen 650 mg every 4-6 hours as needed for pain.  - Benzonatate 1 pill every 8 hours as needed for cough.  - Advise rest and hydration.  - Ensure adequate nutritional intake.  - Instruct to return if fever or dyspnea develops.    Asthma  Asthma stable, no recent exacerbations.          Return if symptoms worsen or fail to improve.    Vee Kelly, PENNY, 5/28/2025, 12:56 PM     This note was prepared using Dragon Medical voice recognition dictation software. As a result errors may occur. When identified, these errors have been corrected. While every attempt is made to correct errors during dictation discrepancies may still exist.          [1]   Current Outpatient Medications   Medication Sig Dispense Refill    benzonatate 200 MG Oral Cap Take 1 capsule (200 mg total) by mouth 3 (three) times daily as needed. 21 capsule 0    omeprazole 20 MG Oral Capsule Delayed Release       albuterol 108 (90 Base) MCG/ACT Inhalation Aero Soln Inhale 2 puffs into the lungs every 4 (four) hours as needed. 54 g 3    Ferrous Gluconate-C-Folic Acid (IRON-C OR) Take by mouth.      Fluticasone-Salmeterol (AIRDUO RESPICLICK 113/14) 113-14 MCG/ACT Inhalation Aerosol Powder, Breath Activated Inhale 1 puff into the lungs 2 (two) times a day. (Patient not taking: Reported on 5/28/2025) 1 each 5

## 2025-05-29 ENCOUNTER — TELEPHONE (OUTPATIENT)
Dept: INTERNAL MEDICINE CLINIC | Facility: CLINIC | Age: 45
End: 2025-05-29

## 2025-05-29 NOTE — TELEPHONE ENCOUNTER
Patient states the medication benzonatate is not covered by her insurance. Patient asking for a different cough medication to be prescribed.    Spoke to [facility] and a coupon can be applied to make cost $17.85. Patient advised and will go  medication now.

## 2025-06-17 DIAGNOSIS — E55.9 VITAMIN D DEFICIENCY: ICD-10-CM

## 2025-06-19 RX ORDER — ALBUTEROL SULFATE 90 UG/1
2 INHALANT RESPIRATORY (INHALATION) EVERY 4 HOURS PRN
Qty: 54 G | Refills: 3 | Status: SHIPPED | OUTPATIENT
Start: 2025-06-19

## 2025-06-19 RX ORDER — FOLIC ACID 1 MG/1
1 TABLET ORAL
Qty: 6 CAPSULE | Refills: 3 | Status: SHIPPED | OUTPATIENT
Start: 2025-06-19

## 2025-06-23 ENCOUNTER — NST DOCUMENTATION (OUTPATIENT)
Dept: OBGYN CLINIC | Facility: CLINIC | Age: 45
End: 2025-06-23

## 2025-06-23 NOTE — NST
Nonstress Test   Patient: Sabino Carrizales Nu    Diagnosis from order:   AMA    2/11/25 NST: 120/mod/R      Ike Garcia DO  6/23/2025  5:14 PM

## (undated) DIAGNOSIS — Z86.11 HISTORY OF PRIMARY TB: ICD-10-CM

## (undated) DIAGNOSIS — R76.12 POSITIVE QUANTIFERON-TB GOLD TEST: Primary | ICD-10-CM

## (undated) DIAGNOSIS — Z11.1 TUBERCULOSIS SCREENING: Primary | ICD-10-CM

## (undated) DEVICE — UNDYED BRAIDED (POLYGLACTIN 910), SYNTHETIC ABSORBABLE SUTURE: Brand: COATED VICRYL

## (undated) DEVICE — ABDOMINAL PAD: Brand: CURITY

## (undated) DEVICE — STERILE LATEX POWDER-FREE SURGICAL GLOVESWITH NITRILE COATING: Brand: PROTEXIS

## (undated) DEVICE — VIOLET BRAIDED (POLYGLACTIN 910), SYNTHETIC ABSORBABLE SUTURE: Brand: COATED VICRYL

## (undated) DEVICE — C SECTION PACK: Brand: MEDLINE INDUSTRIES, INC.

## (undated) DEVICE — KENDALL SCD EXPRESS SLEEVES, KNEE LENGTH, MEDIUM: Brand: KENDALL SCD

## (undated) DEVICE — COVER SGL STRL LGHT HNDL BLU

## (undated) DEVICE — NON-ADHERENT PAD PREPACK: Brand: TELFA

## (undated) DEVICE — REM POLYHESIVE ADULT PATIENT RETURN ELECTRODE: Brand: VALLEYLAB

## (undated) DEVICE — 3M™ STERI-STRIP™ REINFORCED ADHESIVE SKIN CLOSURES, R1547, 1/2 IN X 4 IN (12 MM X 100 MM), 6 STRIPS/ENVELOPE: Brand: 3M™ STERI-STRIP™

## (undated) DEVICE — SUTURE VICRYL 0 J340H

## (undated) DEVICE — STERILE POLYISOPRENE POWDER-FREE SURGICAL GLOVES: Brand: PROTEXIS

## (undated) DEVICE — TRAY CATH BDX IC 14FR 2L FL

## (undated) NOTE — LETTER
VACCINE ADMINISTRATION RECORD  PARENT / GUARDIAN APPROVAL  Date: 2025  Vaccine administered to: Sabino Manning     : 1980    MRN: [id number]    A copy of the appropriate Centers for Disease Control and Prevention Vaccine Information statement has been provided. I have read or have had explained the information about the diseases and the vaccines listed below. There was an opportunity to ask questions and any questions were answered satisfactorily. I believe that I understand the benefits and risks of the vaccine cited and ask that the vaccine(s) listed below be given to me or to the person named above (for whom I am authorized to make this request).    VACCINES ADMINISTERED:  Tdap    I have read and hereby agree to be bound by the terms of this agreement as stated above. My signature is valid until revoked by me in writing.  This document is signed by self, relationship: self on 2025.:                                                                                                                                         Parent / Guardian Signature                                                Date    Shara VALENZUELA CMA served as a witness to authentication that the identity of the person signing electronically is in fact the person represented as signing.

## (undated) NOTE — LETTER
5/2/2025          To Whom It May Concern:    Sabino Manning is currently under my medical care and may not return to work at this time.    Please excuse Ma for 3 weeks.  She may return to work on 05/26/2025.  Activity is restricted as follows: none.    If you require additional information please contact our office.        Sincerely,    PENNY Pruitt           Document generated by:  PENNY Pruitt

## (undated) NOTE — MR AVS SNAPSHOT
[facility]  [address] [facility], [address] [facility]   [phone]               Thank you for choosing us for your health care visit with Isaura Lancaster.  MD Kyleigh.  We are glad to serve you and happy to provide you with this summary of

## (undated) NOTE — LETTER
8/20/2024              Sabino Manning        [address] [facility] [address]         To whom it may concern,    Sabino Manning is under our care and can not lift over 25 lbs.    Please contact us at [phone] with any questions.        Sincerely,    Stephanie Fragoso MD

## (undated) NOTE — LETTER
AGREEMENT FOR TREATMENT WITH Rh IMMUNE GLOBULIN          To:    Gallo Bhakta DO (provider) and [place], [place]     Date: January 3, 2025   Time: 12:29 PM  I authorize the administration of Rh Immune Globulin for    Sabino Manning (myself or name of patient) as deemed advisable in the judgment of the attending physician of his associates or assistants.     It is understood and agreed that the attending physician or his associates and assistants shall be responsible only for the performance of their own individual professional acts and that the blood typing and the selection of compatible Rh Immune Globulin are the responsibilities of those who actually perform the tests.   It has been fully explained that immunization with Rh Immune Globulin is not always successful in producing the desired result.         _____________________________________________  (Patient or person with authority to consent for patient)    _____________________________________________  (Relationship to patient)    _____________________________________________  (Witness)

## (undated) NOTE — LETTER
1/3/2025              Sabino Manning        [address]        [facility] [address]         To Whom It May Concern:    Sabino Manning is under my medical care. She is currently pregnant with an estimated due date of 3/16/25.  Ma is scheduled for repeat  Section on 3/10/25. Patient is to stop working as of 3/1/25.       Sincerely,    Gallo Bhakta, DO

## (undated) NOTE — ED AVS SNAPSHOT
[facility] Emergency Department    [address]     [address] [place] [address]    Phone:  [phone]    Fax:  [phone]           Estevan Rocha   MRN: [id number]    Department:  [facility] Emergency Department   Date of Visit:  4/18/2017 visiting [email or web address].    IF THERE IS ANY CHANGE OR WORSENING OF YOUR CONDITION, CALL YOUR PRIMARY CARE PHYSICIAN AT ONCE OR RETURN IMMEDIATELY TO THE EMERGENCY DEPARTMENT.     If you have been prescribed any medication(s), please fill your prescription

## (undated) NOTE — Clinical Note
If You Are Rh Negative – Routine Administration    If you’re Rh negative, ask your health care provider about getting treated with RhoGam or Rhophylac. Even if you miscarry or don’t deliver the baby, you will still need treatment.  The health of any baby yo o RhoGam or Rhophylac injection in your provider’s office as directed      Location, date and time:

## (undated) NOTE — LETTER
9/9/2024              Sabino Manning        [address]        [facility] [address]         To Whom It May Concern:    Sabino Manning is currently under my care for pregnancy. Ma is currently pregnant with an estimated due date of 3/16/25. Note is to allow Ma to take a break every 2 hours during her work shift.     Sincerely,    Jenna Yusuf MD

## (undated) NOTE — Clinical Note
VACCINE ADMINISTRATION RECORD  PARENT / GUARDIAN APPROVAL  Date: 2017  Vaccine administered to:  Kayla Sam     : 1980    MRN: [id number]    A copy of the appropriate Centers for Disease Control and Prevention Vaccine Information statement has been

## (undated) NOTE — MR AVS SNAPSHOT
[facility] SPECIALTY [facility] [place] [address] [address] [place]  [address] [phone]               Thank you for choosing us for your health care visit with Yoselyn Nuno.  MD Ebony.  We are glad to serve you and happy to provide you with this summary of yo Iron deficiency anemia    Pelvic pain    -  Primary    Upper back pain        Fatigue, unspecified type          Instructions and Information about Your Health     None      Allergies as of Jun 08, 2017     No Known Allergies                Today's Vital Fully enjoy your food when eating. Don’t eat while distracted and slow down. Avoid over sized portions. Don’t eat while when you’re bored.      EAT THESE FOODS MORE OFTEN: EAT THESE FOODS LESS OFTEN:   Make half your plate fruits and vegetables Highly

## (undated) NOTE — IP AVS SNAPSHOT
[address]  [address] [facility], [facility] ~ [phone]                Discharge Summary   4/4/2017    Channing Mirza           Admission Information        Provider Department    4/4/2017 Queen Nunu MD [facility] 3se      Why you These medications were sent to [facility], [address], [phone], [phone] [address] [facility], [address] [place] [address]     Phone:  [phone]    - bisacodyl 10 MG Supp  - ib · Don’t drive until you are no longer taking narcotic, and you are comfortable driving. · Don’t have sexual intercourse until after Baldemar Johnson had a follow-up appointment and you have decided on a birth control method.   · Remember your are on pelvic rest, so [address] [place] [address]  [phone]        Follow up with Victor Hugo Skinner MD. Schedule an appointment as soon as possible for a visit in 6 weeks.     Specialty:  OBSTETRICS & GYNECOLOGY    Why:  postpartum visit    Contact information:    Jessica Padilla 0 (04/06/17)  0  (04/06/17)  8.2 (H) (04/06/17)  0.8 (L) (04/06/17)  0.3 (04/06/17)  0.0 (04/06/17)  0.0      Pending Labs     Order Current Status    SURGICAL PATHOLOGY TISSUE Collected (04/05/17 5902)      Radiology Exams     None      Patient Education - If you are a smoker or have smoked in the last 12 months, we encourage you to explore options for quitting.     - If you have concerns related to behavioral health issues or thoughts of harming yourself, contact [facility] a

## (undated) NOTE — Clinical Note
2017          RE:  Rufino Oppenheim  :  1980      To Whom It May Concern: Rufino Oppenheim  is currently under our care for pregnancy. It is permissible at her gestational age for dental work to be performed.   However, if x-rays are needed, please make sure t

## (undated) NOTE — MR AVS SNAPSHOT
[address]  [phone]               Thank you for choosing us for your health care visit with King Miley MD.  We are glad to serve you and happy to provide you with this summary of your visit. Take 1 tablet (600 mg total) by mouth every 6 (six) hours as needed for Fever. Commonly known as:  MOTRIN           PRENATAL VITAMINS OR   Take by mouth. Sertraline HCl 25 MG Tabs   Take 1 tablet (25 mg total) by mouth daily.    Commonly known a

## (undated) NOTE — Clinical Note
Thank you for the consult. I saw Ms. Manning in the endocrine/diabetes clinic today. Please see attached my note. Her SD was elevated. However, I don't think it is related to the thyroid. Also, BP was on the lower side, may be dehydration?  Please feel free to c

## (undated) NOTE — LETTER
[facility]  Scheduling the Birth of Your Baby    You are scheduled for a  delivery on Monday,3/10/2025 at 7:30am with .  You should arrive at the [facility] at 5:30am.      Please follow the enclosed antimicrobial wash instructions.  This wash should be started 2 days prior to surgery and be continued until the day of surgery, for a total of 3 washes.    There is pre-op testing that has to be done within 2 days before your surgery. These labs must be done within the [facility], not an outside lab. All labs must be scheduled in advance throught  [employer] or by going to [email or web address].    A Nurse from Labor and Delivery  will be calling a few days before your scheduled section to go over instructions regarding an enhanced recovery. If you don't receive a call please call them at [phone].    Unless otherwise instructed by your physician, DO NOT eat or drink anything within 6 hours of your arrival to the [facility].    If you have not preregistered, please mail in your preregistration forms.    The [facility] is located on the 3rd floor of [facility].  Please use the east elevators.    When you arrive, you will be brought to your room and asked to change into a hospital gown.  Your nurse will take your temperature, blood pressure, and pulse and place you on the fetal monitor to monitor the baby's well being and any uterine activity you may be experiencing prior to your delivery.  Your nurse will also ask you some questions, start an intravenous (IV) line, and possibly draw some blood if your lab tests were not completed prior to your arrival.  You will also sign a consent for surgery.    Your partner will be asked to change into scrubs so that he/she can be with you in the operating room for the birth of your child.  There are no cameras or video cameras allowed in the operating room.    You will be interviewed by the  anesthesiologist, support stockings will be applied to your lower legs, and you will be shaved at the incision site.    When surgery is completed, you and your partner will be taken to the recovery room for at least an hour prior to your return to your room.    Please feel free to contact the [facility] with any questions or concerns @ [phone].

## (undated) NOTE — LETTER
24          RE:  Sabino Manning  :  1980      To Whom It May Concern:    Sabino Manning  is currently under our care for pregnancy.  It is permissible at her gestational age for dental work to be performed.  However, if x-rays are needed, please make sure that the abdomen is shielded appropriately, and thoroughly.      Analgesia is permissible with lidocaine only, no epinephrine.  For post treatment pain relief, Tylenol with codeine and Vicodin are acceptable. If an antibiotic is needed, a penicillin derivative may be used.  If penicillin allergic then erythromycin may be used.     If you have any additional concerns, do not hesitate to contact our office.    Sincerely,      Dr. Alis Arizmendi

## (undated) NOTE — LETTER
If You Are Rh Negative - Routine Administration  If you’re Rh negative, ask your health care provider about getting treated with RhoGam or Rhophylac. Even if you miscarry or don’t deliver the baby, you will still need treatment. The health of any baby you have in the future depends on it.   When are you treated?   If your blood has not formed Rh antibodies, you’ll be treated during week 28 of your pregnancy. You also may be treated any time there’s a chance that fetal blood has mixed with yours. For example, this might be after an amniocentesis, a prenatal test; or if you have vaginal bleeding earlier than 28 weeks. Treatment is an injection of a medicine called RhoGam or Rhophylac. RhoGam or Rhophylac prevents Rh antibodies from forming. It won’t harm you or the fetus. After you give birth, your baby’s blood will be tested. If it’s Rh positive, you’ll be given RhoGam or Rhophylac again within 3 days. If it’s Rh negative, you won’t need RhoGam or Rhophylac until your next pregnancy.   Preventing future problems   Your chance of forming Rh antibodies increases with each pregnancy. This is true even for an ectopic pregnancy (the fertilized egg is outside the uterus) and pregnancies that end in miscarriage or . In these cases, you will most likely receive a RhoGam or Rhophylac injection. This is because your body can make Rh antibodies even if you don’t deliver a baby. Rh antibodies can cause problems in future pregnancies such as fetal anemia (low iron in the blood), miscarriage, stillbirth, or a serious illness in the baby that is called hemolytic disease of the .  Fortunately, Rh sensitization is very rare because women who are Rh negative can get a shot that stops their body from making antibodies to Rh positive blood.   You can expect the following:   Blood test to check for blood type and Rh factor at an [facility]  RhoGam or Rhophylac injection in your provider’s office as directed

## (undated) NOTE — IP AVS SNAPSHOT
[address] [place] [address], [facility], [facility] ~ [phone]                Discharge Summary   3/19/2017    Ivelisse Elizalde           Admission Information        Provider Department    3/19/2017 Luigi Arizmendi MD [facility] 3e C-D      Gamaliel Simons · Feeling nauseated or vomiting  · Stomach pain/heartburn  · Swelling in your hands, feet or face  · Sudden weight gain  · Shortness of breath  · \"Just not feeling right\"        Future Appointments     Mar 21, 2017  9:50 AM   Return OB with Lisa Sanchez All belongings returned to patient at discharge Pt's bedside belongings    Medications Sent Home None to return    Medications Returned:       Patient Education    Handwashing & Infection Prevention  Active   Handwashing and Respiratory Hygiene Active

## (undated) NOTE — LETTER
Date & Time: 12/20/2024, 3:39 AM  Patient: Sabino Manning  Encounter Provider(s):    Aisha Williamson MD       To Whom It May Concern:    Sabino Manning was seen and treated in our department on 12/19/2024. She can return to work 12/21/2024.    If you have any questions or concerns, please do not hesitate to call.        _____________________________  Physician/APC Signature

## (undated) NOTE — LETTER
[facility]  Administration of Anesthesia  Sabino PEREZ agree to be cared for by a physician anesthesiologist alone and/or with a nurse anesthetist, who is specially trained to monitor me and give me medicine to put me to sleep or keep me comfortable during my procedure    I understand that my anesthesiologist and/or anesthetist is not an employee or agent of [facility] or [employer]. He or she works for [facility]    As the patient asking for anesthesia services, I agree to:  Allow the anesthesiologist (anesthesia doctor) to give me medicine and do additional procedures as necessary. Some examples are: Starting or using an “IV” to give me medicine, fluids or blood during my procedure, and having a breathing tube placed to help me breathe when I’m asleep (intubation). In the event that my heart stops working properly, I understand that my anesthesiologist will make every effort to sustain my life, unless otherwise directed by [facility] Do Not Resuscitate documents.  Tell my anesthesia doctor before my procedure:  If I am pregnant.  The last time that I ate or drank.  iii. All of the medicines I take (including prescriptions, herbal supplements, and pills I can buy without a prescription (including street drugs/illegal medications). Failure to inform my anesthesiologist about these medicines may increase my risk of anesthetic complications.  iv.If I am allergic to anything or have had a reaction to anesthesia before.  I understand how the anesthesia medicine will help me (benefits).  I understand that with any type of anesthesia medicine there are risks:  The most common risks are: nausea, vomiting, sore throat, muscle soreness, damage to my eyes, mouth, or teeth (from breathing tube placement).  Rare risks include: remembering what happened during my procedure, allergic reactions to medications, injury to my airway, heart, lungs, vision, nerves, or muscles  and in extremely rare instances death.  My doctor has explained to me other choices available to me for my care (alternatives).  Pregnant Patients (“epidural”):  I understand that the risks of having an epidural (medicine given into my back to help control pain during labor), include itching, low blood pressure, difficulty urinating, headache or slowing of the baby’s heart. Very rare risks include infection, bleeding, seizure, irregular heart rhythms and nerve injury.  Regional Anesthesia (“spinal”, “epidural”, & “nerve blocks”):  I understand that rare but potential complications include headache, bleeding, infection, seizure, irregular heart rhythms, and nerve injury.    _____________________________________________________________________________  Patient (or Representative) Signature/Relationship to Patient  Date   Time    _____________________________________________________________________________   Name (if used)    Language/Organization   Time    _____________________________________________________________________________  Nurse Anesthetist Signature     Date   Time  _____________________________________________________________________________  Anesthesiologist Signature     Date   Time  I have discussed the procedure and information above with the patient (or patient’s representative) and answered their questions. The patient or their representative has agreed to have anesthesia services.    _____________________________________________________________________________  Witness        Date   Time  I have verified that the signature is that of the patient or patient’s representative, and that it was signed before the procedure  Patient Name: Sabino Carrizales Nu     : 1980                 Printed: 2025 at 9:15 AM    Medical Record #: [id number]                                            Page 1 of 1  ----------ANESTHESIA CONSENT----------

## (undated) NOTE — LETTER
5/28/2025          To Whom It May Concern:    Please excuse Ma from work on Wednesday, 5/28 through Friday, 5/30. She may return to work Monday, June 2 without restrictions.   If you require additional information please contact our office.        Sincerely,    PENNY Peañ            Document generated by:  PENNY Peña

## (undated) NOTE — ED AVS SNAPSHOT
[facility] Emergency Department    [address]     [address] [place] [address]    Phone:  [phone]    Fax:  [phone]           Merari Laurent   MRN: [id number]    Department:  [facility] Emergency Department   Date of Visit:  4/18/2017 It is our goal to assure that you are completely satisfied with every aspect of your visit today.   In an effort to constantly improve our service to you, we would appreciate any positive or negative feedback related to the care you received in our emergenc [employer] account. You may have had testing done that requires us to contact you. Please make sure we have your correct phone number on file.       I certified that I have received a copy of the aftercare instructions; that these instructions have been expl

## (undated) NOTE — LETTER
INSTRUCTIONS FOR PRE-SURGICAL   ANTIMICROBIAL BATH/SHOWER    Your doctor has recommended a pre-surgical CHG (chlorhexidine gluconate) shower/bath with Betasept (also sold as Hibiclens).  It reduces bacteria that can potentially cause infection.  Betasept is available at [facility] and usually at your local retail pharmacy.    The average adult will use a total of 6 to 10 ounces for three baths.  That is approximately two 4 oz. Bottles.  Depending on individual size, weight and number of treatments, the amount may vary.    IMPORTANT! Read ALL instructions BEFORE starting.     AVOID these areas:  Head: hair, scalp, eyes, ears, nose and mouth  Genital area (inner vaginal and inner rectal)  All open wounds (including surgical incisions)    CONCENTRATE on these areas:  Under arms  Under breast tissue  Between skin folds  Hair bearing areas of groin and between buttocks    DIRECTIONS:  Take your usual shower or bath, rinse  Pour two ounces of Betasept (or Hibiclens) onto moist washcloth  Avoiding head, wash gently (does not foam)  Let sit on skin for three minutes  Rinse completely with water and towel dry    Repeat bath/shower for three consecutive days:  First bath... Two nights before the day of surgery.  Second bath... The night before surgery.  Third bath... The morning of surgery.  Do not apply lotions, deodorants or powder after the last bath.    DISCARD REMAINING PRODUCT AFTER LAST BATH!    DRUG FACTS    Active Ingredient: Chlorhexidine gluconae solution 4%        Warnings:  For external use only.  Do not use:  If you are allergic to chlorhexidine gluconate or any other ingredients listed on the label  As a pre-surgical cleanser of head or face    STOP USE and notify doctor if :  Irritation or allergic reaction occurs  If contact occurs in eyes, ears, mouth, rinse immediately with cold water.    Keep out of reach of children.  If swallowed get medica help or contact Poison Control Center.   Store between 60-80 degrees F.    Fabric Warning!  CHG WILL STAIN YOUR FABRICS!  Use with care around shower curtains, towels washcloths rugs and clothes.  Wipe surfaces immediately if accidentally splashed.      Laundering Instructions:  CHG skin cleanser will cause stains if used with chlorinated products.  Rinse immediately and completely and use only non-chlorine detergents.